# Patient Record
Sex: FEMALE | Race: WHITE | Employment: OTHER | URBAN - METROPOLITAN AREA
[De-identification: names, ages, dates, MRNs, and addresses within clinical notes are randomized per-mention and may not be internally consistent; named-entity substitution may affect disease eponyms.]

---

## 2024-04-23 ENCOUNTER — APPOINTMENT (OUTPATIENT)
Dept: ULTRASOUND IMAGING | Age: 64
DRG: 330 | End: 2024-04-23
Payer: COMMERCIAL

## 2024-04-23 ENCOUNTER — APPOINTMENT (OUTPATIENT)
Dept: GENERAL RADIOLOGY | Age: 64
DRG: 330 | End: 2024-04-23
Payer: COMMERCIAL

## 2024-04-23 ENCOUNTER — APPOINTMENT (OUTPATIENT)
Dept: CT IMAGING | Age: 64
DRG: 330 | End: 2024-04-23
Payer: COMMERCIAL

## 2024-04-23 ENCOUNTER — HOSPITAL ENCOUNTER (INPATIENT)
Age: 64
LOS: 4 days | Discharge: HOME | DRG: 330 | End: 2024-04-27
Attending: EMERGENCY MEDICINE | Admitting: SURGERY
Payer: COMMERCIAL

## 2024-04-23 DIAGNOSIS — R19.7 NAUSEA VOMITING AND DIARRHEA: Primary | ICD-10-CM

## 2024-04-23 DIAGNOSIS — E87.20 LACTIC ACIDOSIS: ICD-10-CM

## 2024-04-23 DIAGNOSIS — R11.2 NAUSEA VOMITING AND DIARRHEA: Primary | ICD-10-CM

## 2024-04-23 DIAGNOSIS — R10.84 GENERALIZED ABDOMINAL PAIN: ICD-10-CM

## 2024-04-23 DIAGNOSIS — K56.3: ICD-10-CM

## 2024-04-23 DIAGNOSIS — K56.3 GALLSTONE ILEUS (HCC): ICD-10-CM

## 2024-04-23 LAB
ALBUMIN SERPL-MCNC: 4.3 G/DL (ref 3.5–5.2)
ALP SERPL-CCNC: 144 U/L (ref 35–104)
ALT SERPL-CCNC: 10 U/L (ref 0–32)
ANION GAP SERPL CALCULATED.3IONS-SCNC: 22 MMOL/L (ref 7–16)
AST SERPL-CCNC: 10 U/L (ref 0–31)
B-OH-BUTYR SERPL-MCNC: 0.26 MMOL/L (ref 0.02–0.27)
BASOPHILS # BLD: 0 K/UL (ref 0–0.2)
BASOPHILS NFR BLD: 0 % (ref 0–2)
BILIRUB DIRECT SERPL-MCNC: 0.4 MG/DL (ref 0–0.3)
BILIRUB INDIRECT SERPL-MCNC: 0.8 MG/DL (ref 0–1)
BILIRUB SERPL-MCNC: 1.2 MG/DL (ref 0–1.2)
BUN SERPL-MCNC: 55 MG/DL (ref 6–23)
CALCIUM SERPL-MCNC: 9.1 MG/DL (ref 8.6–10.2)
CHLORIDE SERPL-SCNC: 94 MMOL/L (ref 98–107)
CO2 SERPL-SCNC: 16 MMOL/L (ref 22–29)
CREAT SERPL-MCNC: 2.4 MG/DL (ref 0.5–1)
EOSINOPHIL # BLD: 0 K/UL (ref 0.05–0.5)
EOSINOPHILS RELATIVE PERCENT: 0 % (ref 0–6)
ERYTHROCYTE [DISTWIDTH] IN BLOOD BY AUTOMATED COUNT: 15.8 % (ref 11.5–15)
GFR SERPL CREATININE-BSD FRML MDRD: 22 ML/MIN/1.73M2
GLUCOSE BLD-MCNC: 177 MG/DL (ref 74–99)
GLUCOSE SERPL-MCNC: 238 MG/DL (ref 74–99)
HCT VFR BLD AUTO: 37.6 % (ref 34–48)
HGB BLD-MCNC: 11.8 G/DL (ref 11.5–15.5)
LACTATE BLDV-SCNC: 2.2 MMOL/L (ref 0.5–1.9)
LACTATE BLDV-SCNC: 3.3 MMOL/L (ref 0.5–1.9)
LACTATE BLDV-SCNC: 4.1 MMOL/L (ref 0.5–2.2)
LIPASE SERPL-CCNC: 15 U/L (ref 13–60)
LYMPHOCYTES NFR BLD: 1.21 K/UL (ref 1.5–4)
LYMPHOCYTES RELATIVE PERCENT: 10 % (ref 20–42)
MCH RBC QN AUTO: 24 PG (ref 26–35)
MCHC RBC AUTO-ENTMCNC: 31.4 G/DL (ref 32–34.5)
MCV RBC AUTO: 76.6 FL (ref 80–99.9)
METAMYELOCYTES ABSOLUTE COUNT: 0.12 K/UL (ref 0–0.12)
METAMYELOCYTES: 1 % (ref 0–1)
MONOCYTES NFR BLD: 1.82 K/UL (ref 0.1–0.95)
MONOCYTES NFR BLD: 15 % (ref 2–12)
MYELOCYTES ABSOLUTE COUNT: 0.12 K/UL
MYELOCYTES: 1 %
NEUTROPHILS NFR BLD: 73 % (ref 43–80)
NEUTS SEG NFR BLD: 8.83 K/UL (ref 1.8–7.3)
PH VENOUS: 7.24 (ref 7.35–7.45)
PLATELET # BLD AUTO: 522 K/UL (ref 130–450)
PMV BLD AUTO: 9.7 FL (ref 7–12)
POTASSIUM SERPL-SCNC: 4.8 MMOL/L (ref 3.5–5)
PROT SERPL-MCNC: 8.6 G/DL (ref 6.4–8.3)
RBC # BLD AUTO: 4.91 M/UL (ref 3.5–5.5)
RBC # BLD: ABNORMAL 10*6/UL
SODIUM SERPL-SCNC: 132 MMOL/L (ref 132–146)
TROPONIN I SERPL HS-MCNC: 21 NG/L (ref 0–9)
TROPONIN I SERPL HS-MCNC: 27 NG/L (ref 0–9)
WBC # BLD: ABNORMAL 10*3/UL
WBC # BLD: ABNORMAL 10*3/UL
WBC OTHER # BLD: 12.1 K/UL (ref 4.5–11.5)

## 2024-04-23 PROCEDURE — 36415 COLL VENOUS BLD VENIPUNCTURE: CPT

## 2024-04-23 PROCEDURE — 80053 COMPREHEN METABOLIC PANEL: CPT

## 2024-04-23 PROCEDURE — 82010 KETONE BODYS QUAN: CPT

## 2024-04-23 PROCEDURE — 2580000003 HC RX 258

## 2024-04-23 PROCEDURE — 2580000003 HC RX 258: Performed by: SURGERY

## 2024-04-23 PROCEDURE — 82248 BILIRUBIN DIRECT: CPT

## 2024-04-23 PROCEDURE — 87040 BLOOD CULTURE FOR BACTERIA: CPT

## 2024-04-23 PROCEDURE — 87086 URINE CULTURE/COLONY COUNT: CPT

## 2024-04-23 PROCEDURE — 84484 ASSAY OF TROPONIN QUANT: CPT

## 2024-04-23 PROCEDURE — 96361 HYDRATE IV INFUSION ADD-ON: CPT

## 2024-04-23 PROCEDURE — 6360000002 HC RX W HCPCS: Performed by: INTERNAL MEDICINE

## 2024-04-23 PROCEDURE — 76705 ECHO EXAM OF ABDOMEN: CPT

## 2024-04-23 PROCEDURE — 93005 ELECTROCARDIOGRAM TRACING: CPT

## 2024-04-23 PROCEDURE — 83690 ASSAY OF LIPASE: CPT

## 2024-04-23 PROCEDURE — 83605 ASSAY OF LACTIC ACID: CPT

## 2024-04-23 PROCEDURE — 99285 EMERGENCY DEPT VISIT HI MDM: CPT

## 2024-04-23 PROCEDURE — 6360000002 HC RX W HCPCS

## 2024-04-23 PROCEDURE — 96374 THER/PROPH/DIAG INJ IV PUSH: CPT

## 2024-04-23 PROCEDURE — 74176 CT ABD & PELVIS W/O CONTRAST: CPT

## 2024-04-23 PROCEDURE — 82962 GLUCOSE BLOOD TEST: CPT

## 2024-04-23 PROCEDURE — 71045 X-RAY EXAM CHEST 1 VIEW: CPT

## 2024-04-23 PROCEDURE — 82800 BLOOD PH: CPT

## 2024-04-23 PROCEDURE — 1200000000 HC SEMI PRIVATE

## 2024-04-23 PROCEDURE — 85025 COMPLETE CBC W/AUTO DIFF WBC: CPT

## 2024-04-23 RX ORDER — ATORVASTATIN CALCIUM 20 MG/1
20 TABLET, FILM COATED ORAL DAILY
COMMUNITY

## 2024-04-23 RX ORDER — MAGNESIUM SULFATE IN WATER 40 MG/ML
2000 INJECTION, SOLUTION INTRAVENOUS PRN
Status: DISCONTINUED | OUTPATIENT
Start: 2024-04-23 | End: 2024-04-23

## 2024-04-23 RX ORDER — ONDANSETRON 2 MG/ML
4 INJECTION INTRAMUSCULAR; INTRAVENOUS EVERY 6 HOURS PRN
Status: DISCONTINUED | OUTPATIENT
Start: 2024-04-23 | End: 2024-04-27 | Stop reason: HOSPADM

## 2024-04-23 RX ORDER — POTASSIUM CHLORIDE 20 MEQ/1
40 TABLET, EXTENDED RELEASE ORAL PRN
Status: DISCONTINUED | OUTPATIENT
Start: 2024-04-23 | End: 2024-04-23

## 2024-04-23 RX ORDER — ONDANSETRON 4 MG/1
4 TABLET, ORALLY DISINTEGRATING ORAL EVERY 8 HOURS PRN
Status: DISCONTINUED | OUTPATIENT
Start: 2024-04-23 | End: 2024-04-27 | Stop reason: HOSPADM

## 2024-04-23 RX ORDER — POTASSIUM CHLORIDE 7.45 MG/ML
10 INJECTION INTRAVENOUS PRN
Status: DISCONTINUED | OUTPATIENT
Start: 2024-04-23 | End: 2024-04-23

## 2024-04-23 RX ORDER — MORPHINE SULFATE 2 MG/ML
2 INJECTION, SOLUTION INTRAMUSCULAR; INTRAVENOUS
Status: DISCONTINUED | OUTPATIENT
Start: 2024-04-23 | End: 2024-04-27 | Stop reason: HOSPADM

## 2024-04-23 RX ORDER — 0.9 % SODIUM CHLORIDE 0.9 %
1500 INTRAVENOUS SOLUTION INTRAVENOUS ONCE
Status: COMPLETED | OUTPATIENT
Start: 2024-04-23 | End: 2024-04-23

## 2024-04-23 RX ORDER — MORPHINE SULFATE 2 MG/ML
1 INJECTION, SOLUTION INTRAMUSCULAR; INTRAVENOUS ONCE
Status: COMPLETED | OUTPATIENT
Start: 2024-04-23 | End: 2024-04-23

## 2024-04-23 RX ORDER — ONDANSETRON 2 MG/ML
4 INJECTION INTRAMUSCULAR; INTRAVENOUS ONCE
Status: COMPLETED | OUTPATIENT
Start: 2024-04-23 | End: 2024-04-23

## 2024-04-23 RX ORDER — ENOXAPARIN SODIUM 100 MG/ML
40 INJECTION SUBCUTANEOUS DAILY
Status: DISCONTINUED | OUTPATIENT
Start: 2024-04-24 | End: 2024-04-23 | Stop reason: DRUGHIGH

## 2024-04-23 RX ORDER — METRONIDAZOLE 500 MG/100ML
500 INJECTION, SOLUTION INTRAVENOUS ONCE
Status: COMPLETED | OUTPATIENT
Start: 2024-04-23 | End: 2024-04-23

## 2024-04-23 RX ORDER — SODIUM CHLORIDE 0.9 % (FLUSH) 0.9 %
5-40 SYRINGE (ML) INJECTION EVERY 12 HOURS SCHEDULED
Status: DISCONTINUED | OUTPATIENT
Start: 2024-04-23 | End: 2024-04-27 | Stop reason: HOSPADM

## 2024-04-23 RX ORDER — ENOXAPARIN SODIUM 100 MG/ML
30 INJECTION SUBCUTANEOUS DAILY
Status: DISCONTINUED | OUTPATIENT
Start: 2024-04-24 | End: 2024-04-24

## 2024-04-23 RX ORDER — METRONIDAZOLE 500 MG/100ML
500 INJECTION, SOLUTION INTRAVENOUS EVERY 8 HOURS
Status: DISCONTINUED | OUTPATIENT
Start: 2024-04-24 | End: 2024-04-24

## 2024-04-23 RX ORDER — SODIUM CHLORIDE 9 MG/ML
INJECTION, SOLUTION INTRAVENOUS PRN
Status: DISCONTINUED | OUTPATIENT
Start: 2024-04-23 | End: 2024-04-27 | Stop reason: HOSPADM

## 2024-04-23 RX ORDER — LEVOTHYROXINE SODIUM 0.03 MG/1
25 TABLET ORAL DAILY
COMMUNITY

## 2024-04-23 RX ORDER — SODIUM CHLORIDE, SODIUM LACTATE, POTASSIUM CHLORIDE, CALCIUM CHLORIDE 600; 310; 30; 20 MG/100ML; MG/100ML; MG/100ML; MG/100ML
INJECTION, SOLUTION INTRAVENOUS CONTINUOUS
Status: DISCONTINUED | OUTPATIENT
Start: 2024-04-23 | End: 2024-04-26

## 2024-04-23 RX ORDER — MORPHINE SULFATE 4 MG/ML
4 INJECTION, SOLUTION INTRAMUSCULAR; INTRAVENOUS
Status: DISCONTINUED | OUTPATIENT
Start: 2024-04-23 | End: 2024-04-27 | Stop reason: HOSPADM

## 2024-04-23 RX ORDER — SODIUM CHLORIDE 0.9 % (FLUSH) 0.9 %
5-40 SYRINGE (ML) INJECTION PRN
Status: DISCONTINUED | OUTPATIENT
Start: 2024-04-23 | End: 2024-04-27 | Stop reason: HOSPADM

## 2024-04-23 RX ORDER — 0.9 % SODIUM CHLORIDE 0.9 %
1000 INTRAVENOUS SOLUTION INTRAVENOUS ONCE
Status: COMPLETED | OUTPATIENT
Start: 2024-04-23 | End: 2024-04-23

## 2024-04-23 RX ADMIN — ONDANSETRON 4 MG: 2 INJECTION INTRAMUSCULAR; INTRAVENOUS at 16:11

## 2024-04-23 RX ADMIN — SODIUM CHLORIDE, PRESERVATIVE FREE 10 ML: 5 INJECTION INTRAVENOUS at 22:36

## 2024-04-23 RX ADMIN — SODIUM CHLORIDE 1500 ML: 9 INJECTION, SOLUTION INTRAVENOUS at 16:05

## 2024-04-23 RX ADMIN — SODIUM CHLORIDE, POTASSIUM CHLORIDE, SODIUM LACTATE AND CALCIUM CHLORIDE: 600; 310; 30; 20 INJECTION, SOLUTION INTRAVENOUS at 21:57

## 2024-04-23 RX ADMIN — METRONIDAZOLE 500 MG: 500 INJECTION, SOLUTION INTRAVENOUS at 19:43

## 2024-04-23 RX ADMIN — SODIUM CHLORIDE 1000 ML: 9 INJECTION, SOLUTION INTRAVENOUS at 19:15

## 2024-04-23 RX ADMIN — MORPHINE SULFATE 1 MG: 2 INJECTION, SOLUTION INTRAMUSCULAR; INTRAVENOUS at 23:12

## 2024-04-23 RX ADMIN — CEFTRIAXONE 1000 MG: 1 INJECTION, POWDER, FOR SOLUTION INTRAMUSCULAR; INTRAVENOUS at 19:40

## 2024-04-23 ASSESSMENT — PAIN DESCRIPTION - ORIENTATION
ORIENTATION: RIGHT;LEFT;UPPER
ORIENTATION: LOWER
ORIENTATION: RIGHT;LEFT;LOWER

## 2024-04-23 ASSESSMENT — PAIN DESCRIPTION - LOCATION
LOCATION: ABDOMEN

## 2024-04-23 ASSESSMENT — ENCOUNTER SYMPTOMS
SORE THROAT: 0
VOICE CHANGE: 0
BLOOD IN STOOL: 0
NAUSEA: 1
BACK PAIN: 0
TROUBLE SWALLOWING: 0
SHORTNESS OF BREATH: 0
WHEEZING: 0
DIARRHEA: 1
ABDOMINAL PAIN: 1
VOMITING: 1
PHOTOPHOBIA: 0

## 2024-04-23 ASSESSMENT — PAIN SCALES - GENERAL
PAINLEVEL_OUTOF10: 7
PAINLEVEL_OUTOF10: 7
PAINLEVEL_OUTOF10: 8

## 2024-04-23 ASSESSMENT — PAIN DESCRIPTION - PAIN TYPE: TYPE: ACUTE PAIN

## 2024-04-23 ASSESSMENT — PAIN DESCRIPTION - DESCRIPTORS
DESCRIPTORS: CRAMPING;SQUEEZING
DESCRIPTORS: CRAMPING
DESCRIPTORS: CRAMPING

## 2024-04-23 ASSESSMENT — LIFESTYLE VARIABLES
HOW OFTEN DO YOU HAVE A DRINK CONTAINING ALCOHOL: NEVER
HOW MANY STANDARD DRINKS CONTAINING ALCOHOL DO YOU HAVE ON A TYPICAL DAY: PATIENT DOES NOT DRINK

## 2024-04-23 ASSESSMENT — PAIN - FUNCTIONAL ASSESSMENT: PAIN_FUNCTIONAL_ASSESSMENT: 0-10

## 2024-04-23 ASSESSMENT — PAIN DESCRIPTION - FREQUENCY: FREQUENCY: INTERMITTENT

## 2024-04-23 NOTE — ED NOTES
Department of Emergency Medicine  FIRST PROVIDER TRIAGE NOTE             Independent MLP           4/23/24  2:51 PM EDT    Date of Encounter: 4/23/24   MRN: [id number]      HPI: Zuri Boggs is a 63 y.o. female who presents to the ED for No chief complaint on file.   Vomiting , diarrhea     ROS: Negative for Suicidal ideation or Homicidal Ideation.    PE: Gen Appearance/Constitutional: alert  CV: regular rate  Pulm: CTA bilat     Initial Plan of Care: All treatment areas with department are currently occupied. Plan to order/Initiate the following while awaiting opening in ED: labs and imaging studies.  Initiate Treatment-Testing, Proceed toTreatment Area When Bed Available for ED Attending/MLP to Continue Care    Electronically signed by SUSAN Corea   DD: 4/23/24

## 2024-04-24 ENCOUNTER — ANESTHESIA EVENT (OUTPATIENT)
Dept: OPERATING ROOM | Age: 64
End: 2024-04-24
Payer: COMMERCIAL

## 2024-04-24 ENCOUNTER — ANESTHESIA (OUTPATIENT)
Dept: OPERATING ROOM | Age: 64
End: 2024-04-24
Payer: COMMERCIAL

## 2024-04-24 PROBLEM — R65.10 SIRS (SYSTEMIC INFLAMMATORY RESPONSE SYNDROME) (HCC): Status: ACTIVE | Noted: 2024-04-24

## 2024-04-24 PROBLEM — R19.7 NAUSEA VOMITING AND DIARRHEA: Status: ACTIVE | Noted: 2024-04-24

## 2024-04-24 PROBLEM — R11.2 NAUSEA VOMITING AND DIARRHEA: Status: ACTIVE | Noted: 2024-04-24

## 2024-04-24 LAB
ALBUMIN SERPL-MCNC: 3.1 G/DL (ref 3.5–5.2)
ALP SERPL-CCNC: 109 U/L (ref 35–104)
ALT SERPL-CCNC: 8 U/L (ref 0–32)
ANION GAP SERPL CALCULATED.3IONS-SCNC: 15 MMOL/L (ref 7–16)
AST SERPL-CCNC: 9 U/L (ref 0–31)
BASOPHILS # BLD: 0.03 K/UL (ref 0–0.2)
BASOPHILS NFR BLD: 1 % (ref 0–2)
BILIRUB SERPL-MCNC: 0.7 MG/DL (ref 0–1.2)
BUN SERPL-MCNC: 51 MG/DL (ref 6–23)
C DIFF GDH + TOXINS A+B STL QL IA.RAPID: NEGATIVE
CALCIUM SERPL-MCNC: 7.6 MG/DL (ref 8.6–10.2)
CHLORIDE SERPL-SCNC: 103 MMOL/L (ref 98–107)
CO2 SERPL-SCNC: 15 MMOL/L (ref 22–29)
CREAT SERPL-MCNC: 1.2 MG/DL (ref 0.5–1)
EKG ATRIAL RATE: 110 BPM
EKG P AXIS: 58 DEGREES
EKG P-R INTERVAL: 120 MS
EKG Q-T INTERVAL: 342 MS
EKG QRS DURATION: 84 MS
EKG QTC CALCULATION (BAZETT): 462 MS
EKG R AXIS: 13 DEGREES
EKG T AXIS: 36 DEGREES
EKG VENTRICULAR RATE: 110 BPM
EOSINOPHIL # BLD: 0.09 K/UL (ref 0.05–0.5)
EOSINOPHILS RELATIVE PERCENT: 3 % (ref 0–6)
ERYTHROCYTE [DISTWIDTH] IN BLOOD BY AUTOMATED COUNT: 15.8 % (ref 11.5–15)
GFR SERPL CREATININE-BSD FRML MDRD: 53 ML/MIN/1.73M2
GLUCOSE BLD-MCNC: 171 MG/DL (ref 74–99)
GLUCOSE BLD-MCNC: 192 MG/DL (ref 74–99)
GLUCOSE BLD-MCNC: 208 MG/DL (ref 74–99)
GLUCOSE BLD-MCNC: 208 MG/DL (ref 74–99)
GLUCOSE BLD-MCNC: 231 MG/DL (ref 74–99)
GLUCOSE SERPL-MCNC: 202 MG/DL (ref 74–99)
HBA1C MFR BLD: 7.5 % (ref 4–5.6)
HCT VFR BLD AUTO: 31.5 % (ref 34–48)
HGB BLD-MCNC: 9.7 G/DL (ref 11.5–15.5)
LACTATE BLDV-SCNC: 1.6 MMOL/L (ref 0.5–2.2)
LYMPHOCYTES NFR BLD: 0.34 K/UL (ref 1.5–4)
LYMPHOCYTES RELATIVE PERCENT: 10 % (ref 20–42)
MCH RBC QN AUTO: 23.6 PG (ref 26–35)
MCHC RBC AUTO-ENTMCNC: 30.8 G/DL (ref 32–34.5)
MCV RBC AUTO: 76.6 FL (ref 80–99.9)
METAMYELOCYTES ABSOLUTE COUNT: 0.06 K/UL (ref 0–0.12)
METAMYELOCYTES: 2 % (ref 0–1)
MONOCYTES NFR BLD: 0.25 K/UL (ref 0.1–0.95)
MONOCYTES NFR BLD: 7 % (ref 2–12)
MYELOCYTES ABSOLUTE COUNT: 0.03 K/UL
MYELOCYTES: 1 %
NEUTROPHILS NFR BLD: 77 % (ref 43–80)
NEUTS SEG NFR BLD: 2.79 K/UL (ref 1.8–7.3)
PLATELET # BLD AUTO: 378 K/UL (ref 130–450)
PMV BLD AUTO: 9.7 FL (ref 7–12)
POTASSIUM SERPL-SCNC: 4.3 MMOL/L (ref 3.5–5)
PROT SERPL-MCNC: 6.9 G/DL (ref 6.4–8.3)
RBC # BLD AUTO: 4.11 M/UL (ref 3.5–5.5)
RBC # BLD: NORMAL 10*6/UL
SODIUM SERPL-SCNC: 133 MMOL/L (ref 132–146)
SPECIMEN DESCRIPTION: NORMAL
TSH SERPL DL<=0.05 MIU/L-ACNC: 1.63 UIU/ML (ref 0.27–4.2)
WBC OTHER # BLD: 3.6 K/UL (ref 4.5–11.5)

## 2024-04-24 PROCEDURE — 99254 IP/OBS CNSLTJ NEW/EST MOD 60: CPT | Performed by: INTERNAL MEDICINE

## 2024-04-24 PROCEDURE — 6360000002 HC RX W HCPCS: Performed by: SURGERY

## 2024-04-24 PROCEDURE — 7100000000 HC PACU RECOVERY - FIRST 15 MIN: Performed by: SURGERY

## 2024-04-24 PROCEDURE — 3700000000 HC ANESTHESIA ATTENDED CARE: Performed by: SURGERY

## 2024-04-24 PROCEDURE — 44025 INCISION OF LARGE BOWEL: CPT | Performed by: SURGERY

## 2024-04-24 PROCEDURE — 2580000003 HC RX 258: Performed by: INTERNAL MEDICINE

## 2024-04-24 PROCEDURE — 93010 ELECTROCARDIOGRAM REPORT: CPT | Performed by: INTERNAL MEDICINE

## 2024-04-24 PROCEDURE — 7100000001 HC PACU RECOVERY - ADDTL 15 MIN: Performed by: SURGERY

## 2024-04-24 PROCEDURE — 2580000003 HC RX 258

## 2024-04-24 PROCEDURE — 0DC84ZZ EXTIRPATION OF MATTER FROM SMALL INTESTINE, PERCUTANEOUS ENDOSCOPIC APPROACH: ICD-10-PCS | Performed by: SURGERY

## 2024-04-24 PROCEDURE — 6360000002 HC RX W HCPCS: Performed by: INTERNAL MEDICINE

## 2024-04-24 PROCEDURE — 99223 1ST HOSP IP/OBS HIGH 75: CPT | Performed by: SURGERY

## 2024-04-24 PROCEDURE — 1200000000 HC SEMI PRIVATE

## 2024-04-24 PROCEDURE — 2500000003 HC RX 250 WO HCPCS: Performed by: SURGERY

## 2024-04-24 PROCEDURE — 6360000002 HC RX W HCPCS

## 2024-04-24 PROCEDURE — 2580000003 HC RX 258: Performed by: SURGERY

## 2024-04-24 PROCEDURE — 83605 ASSAY OF LACTIC ACID: CPT

## 2024-04-24 PROCEDURE — 87449 NOS EACH ORGANISM AG IA: CPT

## 2024-04-24 PROCEDURE — 0DU847Z SUPPLEMENT SMALL INTESTINE WITH AUTOLOGOUS TISSUE SUBSTITUTE, PERCUTANEOUS ENDOSCOPIC APPROACH: ICD-10-PCS | Performed by: SURGERY

## 2024-04-24 PROCEDURE — 36415 COLL VENOUS BLD VENIPUNCTURE: CPT

## 2024-04-24 PROCEDURE — 85025 COMPLETE CBC W/AUTO DIFF WBC: CPT

## 2024-04-24 PROCEDURE — 82962 GLUCOSE BLOOD TEST: CPT

## 2024-04-24 PROCEDURE — 3700000001 HC ADD 15 MINUTES (ANESTHESIA): Performed by: SURGERY

## 2024-04-24 PROCEDURE — 2720000010 HC SURG SUPPLY STERILE: Performed by: SURGERY

## 2024-04-24 PROCEDURE — 2709999900 HC NON-CHARGEABLE SUPPLY: Performed by: SURGERY

## 2024-04-24 PROCEDURE — 49905 OMENTAL FLAP INTRA-ABDOM: CPT | Performed by: SURGERY

## 2024-04-24 PROCEDURE — 87324 CLOSTRIDIUM AG IA: CPT

## 2024-04-24 PROCEDURE — 3600000004 HC SURGERY LEVEL 4 BASE: Performed by: SURGERY

## 2024-04-24 PROCEDURE — 2500000003 HC RX 250 WO HCPCS

## 2024-04-24 PROCEDURE — 83036 HEMOGLOBIN GLYCOSYLATED A1C: CPT

## 2024-04-24 PROCEDURE — 88307 TISSUE EXAM BY PATHOLOGIST: CPT

## 2024-04-24 PROCEDURE — 84443 ASSAY THYROID STIM HORMONE: CPT

## 2024-04-24 PROCEDURE — 0DB84ZZ EXCISION OF SMALL INTESTINE, PERCUTANEOUS ENDOSCOPIC APPROACH: ICD-10-PCS | Performed by: SURGERY

## 2024-04-24 PROCEDURE — 6370000000 HC RX 637 (ALT 250 FOR IP): Performed by: SURGERY

## 2024-04-24 PROCEDURE — 3600000014 HC SURGERY LEVEL 4 ADDTL 15MIN: Performed by: SURGERY

## 2024-04-24 PROCEDURE — 44120 REMOVAL OF SMALL INTESTINE: CPT | Performed by: SURGERY

## 2024-04-24 PROCEDURE — 80053 COMPREHEN METABOLIC PANEL: CPT

## 2024-04-24 RX ORDER — ONDANSETRON 2 MG/ML
4 INJECTION INTRAMUSCULAR; INTRAVENOUS
Status: CANCELLED | OUTPATIENT
Start: 2024-04-24 | End: 2024-04-25

## 2024-04-24 RX ORDER — FENTANYL CITRATE 0.05 MG/ML
50 INJECTION, SOLUTION INTRAMUSCULAR; INTRAVENOUS EVERY 5 MIN PRN
Status: CANCELLED | OUTPATIENT
Start: 2024-04-24

## 2024-04-24 RX ORDER — METHOCARBAMOL 500 MG/1
500 TABLET, FILM COATED ORAL 4 TIMES DAILY
Status: DISCONTINUED | OUTPATIENT
Start: 2024-04-24 | End: 2024-04-27 | Stop reason: HOSPADM

## 2024-04-24 RX ORDER — GLYCOPYRROLATE 0.2 MG/ML
INJECTION INTRAMUSCULAR; INTRAVENOUS PRN
Status: DISCONTINUED | OUTPATIENT
Start: 2024-04-24 | End: 2024-04-24 | Stop reason: SDUPTHER

## 2024-04-24 RX ORDER — DEXAMETHASONE SODIUM PHOSPHATE 10 MG/ML
INJECTION, SOLUTION INTRAMUSCULAR; INTRAVENOUS PRN
Status: DISCONTINUED | OUTPATIENT
Start: 2024-04-24 | End: 2024-04-24 | Stop reason: SDUPTHER

## 2024-04-24 RX ORDER — BUPIVACAINE HYDROCHLORIDE AND EPINEPHRINE 2.5; 5 MG/ML; UG/ML
INJECTION, SOLUTION EPIDURAL; INFILTRATION; INTRACAUDAL; PERINEURAL PRN
Status: DISCONTINUED | OUTPATIENT
Start: 2024-04-24 | End: 2024-04-24 | Stop reason: ALTCHOICE

## 2024-04-24 RX ORDER — LIDOCAINE HYDROCHLORIDE 20 MG/ML
INJECTION, SOLUTION INTRAVENOUS PRN
Status: DISCONTINUED | OUTPATIENT
Start: 2024-04-24 | End: 2024-04-24 | Stop reason: SDUPTHER

## 2024-04-24 RX ORDER — SODIUM CHLORIDE 0.9 % (FLUSH) 0.9 %
5-40 SYRINGE (ML) INJECTION EVERY 12 HOURS SCHEDULED
Status: CANCELLED | OUTPATIENT
Start: 2024-04-24

## 2024-04-24 RX ORDER — TRAMADOL HYDROCHLORIDE 50 MG/1
50 TABLET ORAL EVERY 6 HOURS PRN
Status: DISCONTINUED | OUTPATIENT
Start: 2024-04-24 | End: 2024-04-27 | Stop reason: HOSPADM

## 2024-04-24 RX ORDER — SUCCINYLCHOLINE/SOD CL,ISO/PF 200MG/10ML
SYRINGE (ML) INTRAVENOUS PRN
Status: DISCONTINUED | OUTPATIENT
Start: 2024-04-24 | End: 2024-04-24 | Stop reason: SDUPTHER

## 2024-04-24 RX ORDER — SODIUM CHLORIDE, SODIUM LACTATE, POTASSIUM CHLORIDE, AND CALCIUM CHLORIDE .6; .31; .03; .02 G/100ML; G/100ML; G/100ML; G/100ML
500 INJECTION, SOLUTION INTRAVENOUS ONCE
Status: COMPLETED | OUTPATIENT
Start: 2024-04-24 | End: 2024-04-24

## 2024-04-24 RX ORDER — ENOXAPARIN SODIUM 100 MG/ML
40 INJECTION SUBCUTANEOUS DAILY
Status: DISCONTINUED | OUTPATIENT
Start: 2024-04-25 | End: 2024-04-27 | Stop reason: HOSPADM

## 2024-04-24 RX ORDER — DEXTROSE MONOHYDRATE 100 MG/ML
INJECTION, SOLUTION INTRAVENOUS CONTINUOUS PRN
Status: DISCONTINUED | OUTPATIENT
Start: 2024-04-24 | End: 2024-04-27 | Stop reason: HOSPADM

## 2024-04-24 RX ORDER — DIPHENHYDRAMINE HYDROCHLORIDE 50 MG/ML
12.5 INJECTION INTRAMUSCULAR; INTRAVENOUS
Status: CANCELLED | OUTPATIENT
Start: 2024-04-24 | End: 2024-04-25

## 2024-04-24 RX ORDER — ONDANSETRON 2 MG/ML
INJECTION INTRAMUSCULAR; INTRAVENOUS PRN
Status: DISCONTINUED | OUTPATIENT
Start: 2024-04-24 | End: 2024-04-24 | Stop reason: SDUPTHER

## 2024-04-24 RX ORDER — FENTANYL CITRATE 50 UG/ML
INJECTION, SOLUTION INTRAMUSCULAR; INTRAVENOUS PRN
Status: DISCONTINUED | OUTPATIENT
Start: 2024-04-24 | End: 2024-04-24 | Stop reason: SDUPTHER

## 2024-04-24 RX ORDER — GLUCAGON 1 MG/ML
1 KIT INJECTION PRN
Status: DISCONTINUED | OUTPATIENT
Start: 2024-04-24 | End: 2024-04-27 | Stop reason: HOSPADM

## 2024-04-24 RX ORDER — TRAMADOL HYDROCHLORIDE 50 MG/1
25 TABLET ORAL EVERY 6 HOURS PRN
Status: DISCONTINUED | OUTPATIENT
Start: 2024-04-24 | End: 2024-04-27 | Stop reason: HOSPADM

## 2024-04-24 RX ORDER — METRONIDAZOLE 500 MG/100ML
500 INJECTION, SOLUTION INTRAVENOUS EVERY 8 HOURS
Status: DISCONTINUED | OUTPATIENT
Start: 2024-04-24 | End: 2024-04-24

## 2024-04-24 RX ORDER — NALOXONE HYDROCHLORIDE 0.4 MG/ML
INJECTION, SOLUTION INTRAMUSCULAR; INTRAVENOUS; SUBCUTANEOUS PRN
Status: CANCELLED | OUTPATIENT
Start: 2024-04-24

## 2024-04-24 RX ORDER — INSULIN LISPRO 100 [IU]/ML
0-4 INJECTION, SOLUTION INTRAVENOUS; SUBCUTANEOUS EVERY 4 HOURS
Status: DISCONTINUED | OUTPATIENT
Start: 2024-04-24 | End: 2024-04-27 | Stop reason: HOSPADM

## 2024-04-24 RX ORDER — MIDAZOLAM HYDROCHLORIDE 1 MG/ML
INJECTION INTRAMUSCULAR; INTRAVENOUS PRN
Status: DISCONTINUED | OUTPATIENT
Start: 2024-04-24 | End: 2024-04-24 | Stop reason: SDUPTHER

## 2024-04-24 RX ORDER — ROCURONIUM BROMIDE 10 MG/ML
INJECTION, SOLUTION INTRAVENOUS PRN
Status: DISCONTINUED | OUTPATIENT
Start: 2024-04-24 | End: 2024-04-24 | Stop reason: SDUPTHER

## 2024-04-24 RX ORDER — SODIUM CHLORIDE 9 MG/ML
INJECTION, SOLUTION INTRAVENOUS PRN
Status: CANCELLED | OUTPATIENT
Start: 2024-04-24

## 2024-04-24 RX ORDER — PROPOFOL 10 MG/ML
INJECTION, EMULSION INTRAVENOUS PRN
Status: DISCONTINUED | OUTPATIENT
Start: 2024-04-24 | End: 2024-04-24 | Stop reason: SDUPTHER

## 2024-04-24 RX ORDER — SODIUM CHLORIDE 0.9 % (FLUSH) 0.9 %
5-40 SYRINGE (ML) INJECTION PRN
Status: CANCELLED | OUTPATIENT
Start: 2024-04-24

## 2024-04-24 RX ORDER — NEOSTIGMINE METHYLSULFATE 1 MG/ML
INJECTION, SOLUTION INTRAVENOUS PRN
Status: DISCONTINUED | OUTPATIENT
Start: 2024-04-24 | End: 2024-04-24 | Stop reason: SDUPTHER

## 2024-04-24 RX ORDER — PROCHLORPERAZINE EDISYLATE 5 MG/ML
5 INJECTION INTRAMUSCULAR; INTRAVENOUS
Status: CANCELLED | OUTPATIENT
Start: 2024-04-24 | End: 2024-04-25

## 2024-04-24 RX ORDER — SODIUM CHLORIDE 9 MG/ML
INJECTION, SOLUTION INTRAVENOUS CONTINUOUS PRN
Status: DISCONTINUED | OUTPATIENT
Start: 2024-04-24 | End: 2024-04-24 | Stop reason: SDUPTHER

## 2024-04-24 RX ORDER — LORAZEPAM 2 MG/ML
0.5 INJECTION INTRAMUSCULAR
Status: CANCELLED | OUTPATIENT
Start: 2024-04-24 | End: 2024-04-25

## 2024-04-24 RX ADMIN — ONDANSETRON 4 MG: 4 TABLET, ORALLY DISINTEGRATING ORAL at 04:32

## 2024-04-24 RX ADMIN — MORPHINE SULFATE 2 MG: 2 INJECTION, SOLUTION INTRAMUSCULAR; INTRAVENOUS at 05:22

## 2024-04-24 RX ADMIN — ROCURONIUM BROMIDE 10 MG: 10 INJECTION INTRAVENOUS at 09:39

## 2024-04-24 RX ADMIN — FENTANYL CITRATE 50 MCG: 50 INJECTION, SOLUTION INTRAMUSCULAR; INTRAVENOUS at 10:01

## 2024-04-24 RX ADMIN — SODIUM CHLORIDE, POTASSIUM CHLORIDE, SODIUM LACTATE AND CALCIUM CHLORIDE 500 ML: 600; 310; 30; 20 INJECTION, SOLUTION INTRAVENOUS at 16:20

## 2024-04-24 RX ADMIN — Medication 180 MG: at 09:39

## 2024-04-24 RX ADMIN — METRONIDAZOLE 500 MG: 500 INJECTION, SOLUTION INTRAVENOUS at 09:54

## 2024-04-24 RX ADMIN — PHENYLEPHRINE HYDROCHLORIDE 200 MCG: 10 INJECTION INTRAVENOUS at 09:49

## 2024-04-24 RX ADMIN — ROCURONIUM BROMIDE 10 MG: 10 INJECTION INTRAVENOUS at 09:55

## 2024-04-24 RX ADMIN — SODIUM CHLORIDE, POTASSIUM CHLORIDE, SODIUM LACTATE AND CALCIUM CHLORIDE: 600; 310; 30; 20 INJECTION, SOLUTION INTRAVENOUS at 23:00

## 2024-04-24 RX ADMIN — MIDAZOLAM 1 MG: 1 INJECTION INTRAMUSCULAR; INTRAVENOUS at 09:34

## 2024-04-24 RX ADMIN — GLYCOPYRROLATE 0.6 MG: 0.2 INJECTION INTRAMUSCULAR; INTRAVENOUS at 10:17

## 2024-04-24 RX ADMIN — SODIUM CHLORIDE: 900 INJECTION, SOLUTION INTRAVENOUS at 09:56

## 2024-04-24 RX ADMIN — SODIUM CHLORIDE, POTASSIUM CHLORIDE, SODIUM LACTATE AND CALCIUM CHLORIDE: 600; 310; 30; 20 INJECTION, SOLUTION INTRAVENOUS at 11:50

## 2024-04-24 RX ADMIN — PHENYLEPHRINE HYDROCHLORIDE 200 MCG: 10 INJECTION INTRAVENOUS at 10:12

## 2024-04-24 RX ADMIN — DEXAMETHASONE SODIUM PHOSPHATE 10 MG: 10 INJECTION, SOLUTION INTRAMUSCULAR; INTRAVENOUS at 09:47

## 2024-04-24 RX ADMIN — SODIUM CHLORIDE, POTASSIUM CHLORIDE, SODIUM LACTATE AND CALCIUM CHLORIDE 500 ML: 600; 310; 30; 20 INJECTION, SOLUTION INTRAVENOUS at 14:07

## 2024-04-24 RX ADMIN — INSULIN LISPRO 1 UNITS: 100 INJECTION, SOLUTION INTRAVENOUS; SUBCUTANEOUS at 14:40

## 2024-04-24 RX ADMIN — SODIUM CHLORIDE, POTASSIUM CHLORIDE, SODIUM LACTATE AND CALCIUM CHLORIDE: 600; 310; 30; 20 INJECTION, SOLUTION INTRAVENOUS at 04:35

## 2024-04-24 RX ADMIN — PROPOFOL 150 MG: 10 INJECTION, EMULSION INTRAVENOUS at 09:39

## 2024-04-24 RX ADMIN — ONDANSETRON 4 MG: 2 INJECTION INTRAMUSCULAR; INTRAVENOUS at 09:54

## 2024-04-24 RX ADMIN — PIPERACILLIN AND TAZOBACTAM 3375 MG: 3; .375 INJECTION, POWDER, LYOPHILIZED, FOR SOLUTION INTRAVENOUS at 22:59

## 2024-04-24 RX ADMIN — FENTANYL CITRATE 50 MCG: 50 INJECTION, SOLUTION INTRAMUSCULAR; INTRAVENOUS at 10:20

## 2024-04-24 RX ADMIN — PIPERACILLIN AND TAZOBACTAM 3375 MG: 3; .375 INJECTION, POWDER, LYOPHILIZED, FOR SOLUTION INTRAVENOUS at 14:22

## 2024-04-24 RX ADMIN — INSULIN LISPRO 1 UNITS: 100 INJECTION, SOLUTION INTRAVENOUS; SUBCUTANEOUS at 19:07

## 2024-04-24 RX ADMIN — ROCURONIUM BROMIDE 10 MG: 10 INJECTION INTRAVENOUS at 10:00

## 2024-04-24 RX ADMIN — METHOCARBAMOL 500 MG: 500 TABLET ORAL at 13:08

## 2024-04-24 RX ADMIN — METHOCARBAMOL 500 MG: 500 TABLET ORAL at 17:38

## 2024-04-24 RX ADMIN — SODIUM CHLORIDE, PRESERVATIVE FREE 10 ML: 5 INJECTION INTRAVENOUS at 11:50

## 2024-04-24 RX ADMIN — MORPHINE SULFATE 2 MG: 2 INJECTION, SOLUTION INTRAMUSCULAR; INTRAVENOUS at 02:02

## 2024-04-24 RX ADMIN — PHENYLEPHRINE HYDROCHLORIDE 100 MCG: 10 INJECTION INTRAVENOUS at 09:44

## 2024-04-24 RX ADMIN — TRAMADOL HYDROCHLORIDE 50 MG: 50 TABLET ORAL at 14:08

## 2024-04-24 RX ADMIN — FENTANYL CITRATE 150 MCG: 50 INJECTION, SOLUTION INTRAMUSCULAR; INTRAVENOUS at 09:39

## 2024-04-24 RX ADMIN — Medication 3 MG: at 10:17

## 2024-04-24 RX ADMIN — SODIUM CHLORIDE, PRESERVATIVE FREE 10 ML: 5 INJECTION INTRAVENOUS at 22:54

## 2024-04-24 RX ADMIN — LIDOCAINE HYDROCHLORIDE 100 MG: 20 INJECTION, SOLUTION INTRAVENOUS at 09:39

## 2024-04-24 RX ADMIN — METRONIDAZOLE 500 MG: 500 INJECTION, SOLUTION INTRAVENOUS at 03:18

## 2024-04-24 ASSESSMENT — PAIN DESCRIPTION - DESCRIPTORS
DESCRIPTORS: ACHING
DESCRIPTORS: ACHING

## 2024-04-24 ASSESSMENT — PAIN DESCRIPTION - ORIENTATION
ORIENTATION: LOWER
ORIENTATION: MID

## 2024-04-24 ASSESSMENT — PAIN SCALES - GENERAL
PAINLEVEL_OUTOF10: 0
PAINLEVEL_OUTOF10: 7
PAINLEVEL_OUTOF10: 0
PAINLEVEL_OUTOF10: 0
PAINLEVEL_OUTOF10: 6
PAINLEVEL_OUTOF10: 0
PAINLEVEL_OUTOF10: 8
PAINLEVEL_OUTOF10: 10
PAINLEVEL_OUTOF10: 6

## 2024-04-24 ASSESSMENT — PAIN DESCRIPTION - LOCATION
LOCATION: ABDOMEN
LOCATION: BACK
LOCATION: ABDOMEN
LOCATION: ABDOMEN

## 2024-04-24 NOTE — PLAN OF CARE
[facility] Hospitalist   Progress Note    Admitting Date and Time: 4/23/2024  3:07 PM  Admit Dx: Obstruction of intestine by gallstone (HCC) [K56.3]  Gallstone ileus (HCC) [K56.3]    Subjective:    Pt admitted earlier this morning with gallstone ileus and possible intraintestinal foreign body.  She had laparoscopic small bowel resection and removal of intestinal foreign body this morning without immediate complications.    At this time she is awake, alert, but appears very fatigued.  Vital signs significant for persistent tachycardia.  Clinically she appears dehydrated.    ROS: denies fever, chills, cp, sob, n/v, HA unless stated above.     insulin lispro  0-4 Units SubCUTAneous Q4H    methocarbamol  500 mg Oral 4x Daily    [START ON 4/25/2024] enoxaparin  40 mg SubCUTAneous Daily    piperacillin-tazobactam  3,375 mg IntraVENous Q8H    lactated ringers  500 mL IntraVENous Once    sodium chloride flush  5-40 mL IntraVENous 2 times per day     glucose, 4 tablet, PRN  dextrose bolus, 125 mL, PRN   Or  dextrose bolus, 250 mL, PRN  glucagon (rDNA), 1 mg, PRN  dextrose, , Continuous PRN  traMADol, 25 mg, Q6H PRN   Or  traMADol, 50 mg, Q6H PRN  sodium chloride flush, 5-40 mL, PRN  sodium chloride, , PRN  ondansetron, 4 mg, Q8H PRN   Or  ondansetron, 4 mg, Q6H PRN  morphine, 2 mg, Q2H PRN   Or  morphine, 4 mg, Q2H PRN         Objective:    BP (!) 106/55   Pulse (!) 134   Temp 99.7 °F (37.6 °C) (Oral)   Resp 20   Ht 1.499 m (4' 11\")   Wt 74.8 kg (165 lb)   SpO2 93%   BMI 33.33 kg/m²   General Appearance: Awake, alert, oriented x 3 but appears very fatigued  Skin: warm and dry  Head: normocephalic and atraumatic  Eyes: pupils equal, round, and reactive to light, extraocular eye movements intact, conjunctivae normal  Neck: neck supple and non tender without mass   Pulmonary/Chest: normal effort on room air.  Clear to auscultation bilaterally without wheezes, crackles, or rhonchi  Cardiovascular:

## 2024-04-24 NOTE — H&P
General Surgery History and Physical    Patient's Name/Date of Birth: Zuri Boggs / 1960    Date: April 24, 2024     Surgeon: Howie Arce M.D.    PCP: No primary care provider on file.     Chief Complaint: abd pain     HPI:   Zuri Boggs is a 63 y.o. female who presents for evaluation of abd pain and came to ED where CT done likely gallstone ileus. Timing is constant, radiation to low abdomen, alleviated by nothing and started days ago and severity is 2-8/10      No past medical history on file.    No past surgical history on file.    Current Facility-Administered Medications   Medication Dose Route Frequency Provider Last Rate Last Admin    insulin lispro (HUMALOG) injection vial 0-4 Units  0-4 Units SubCUTAneous Q4H Doreen Ceron MD        glucose chewable tablet 16 g  4 tablet Oral PRN Doreen Ceron MD        dextrose bolus 10% 125 mL  125 mL IntraVENous PRN Doreen Ceron MD        Or    dextrose bolus 10% 250 mL  250 mL IntraVENous PRN Doreen Ceron MD        glucagon injection 1 mg  1 mg SubCUTAneous PRN Doreen Ceron MD        dextrose 10 % infusion   IntraVENous Continuous PRN Doreen Ceron MD        sodium chloride flush 0.9 % injection 5-40 mL  5-40 mL IntraVENous 2 times per day Howie Arce MD   10 mL at 04/23/24 2236    sodium chloride flush 0.9 % injection 5-40 mL  5-40 mL IntraVENous PRN Howie Arce MD        0.9 % sodium chloride infusion   IntraVENous PRN Howie Arce MD        ondansetron (ZOFRAN-ODT) disintegrating tablet 4 mg  4 mg Oral Q8H PRN Howie Arce MD   4 mg at 04/24/24 0432    Or    ondansetron (ZOFRAN) injection 4 mg  4 mg IntraVENous Q6H PRN Howie Arce MD        lactated ringers IV soln infusion   IntraVENous Continuous Howie Arce  mL/hr at 04/24/24 0435 New Bag at 04/24/24 0435    morphine (PF) injection 2 mg  2 mg IntraVENous Q2H PRN Howie Arce MD   2 mg at 04/24/24 0522    Or    morphine injection 4 mg  4  consent.      Howie Arce MD  8:43 AM  4/24/2024

## 2024-04-24 NOTE — ANESTHESIA POSTPROCEDURE EVALUATION
Department of Anesthesiology  Postprocedure Note    Patient: Zuri Boggs  MRN: [id number]  YOB: 1960  Date of evaluation: 4/24/2024    Procedure Summary       Date: 04/24/24 Room / Location: [address]    Anesthesia Start: 0932 Anesthesia Stop: 1031    Procedure: EXPLORATORY LAPAROSCOPY SMALL  BOWEL RESECTION (Abdomen) Diagnosis:       Gallstone ileus (HCC)      (Gallstone ileus (HCC) [K56.3])    Surgeons: Howie Arce MD Responsible Provider: Jag Enriquez MD    Anesthesia Type: General ASA Status: 3            Anesthesia Type: General    Mary Beth Phase I: Mary Beth Score: 9    Mary Beth Phase II:      Anesthesia Post Evaluation    Patient location during evaluation: PACU  Patient participation: complete - patient participated  Level of consciousness: awake and alert  Pain score: 3  Airway patency: patent  Nausea & Vomiting: no nausea  Cardiovascular status: blood pressure returned to baseline  Respiratory status: acceptable  Hydration status: euvolemic  Pain management: adequate    No notable events documented.

## 2024-04-24 NOTE — ED PROVIDER NOTES
[facility] 4 MED SURG TELE  EMERGENCY DEPARTMENT ENCOUNTER      Pt Name: Zuri Boggs  MRN: [id number]  Birthdate 1960  Date of evaluation: 4/23/2024  Provider: Juliano Castano DO  PCP: No primary care provider on file.    HPI: 63-year-old female present emerged part complaints of abdominal pain nausea vomiting diarrhea.  Began on Sunday.  Patient denies any traumatic injury.  Reports that she was eating shrimp and [employer] prior to symptoms onset from [employer] on Friday.  Decreased intake secondary to nausea.  ANO x 4 following commands.  Denies any numbness weakness chest pain shortness of breath patient reports that she has a history of low blood pressures at baseline.     Chief Complaint   Patient presents with    Emesis    Abdominal Pain     LOWER ABD PAIN SINCE SUNDAY/ DIARRHEA       Review of Systems   Constitutional:  Negative for chills, fatigue and fever.   HENT:  Negative for congestion, nosebleeds, sore throat, trouble swallowing and voice change.    Eyes:  Negative for photophobia and visual disturbance.   Respiratory:  Negative for shortness of breath and wheezing.    Cardiovascular:  Negative for chest pain and palpitations.   Gastrointestinal:  Positive for abdominal pain, diarrhea, nausea and vomiting. Negative for blood in stool.   Genitourinary:  Negative for dysuria, frequency and urgency.   Musculoskeletal:  Negative for arthralgias, back pain, neck pain and neck stiffness.   Skin:  Negative for rash and wound.   Neurological:  Negative for dizziness, syncope, weakness, light-headedness, numbness and headaches.   Psychiatric/Behavioral:  Negative for behavioral problems and confusion.         Physical Exam  Vitals reviewed.   Constitutional:       General: She is not in acute distress.     Appearance: Normal appearance.   HENT:      Head: Normocephalic.      Right Ear: External ear normal.      Left Ear: External ear normal.      Nose: Nose normal.      Mouth/Throat:      Pharynx: No oropharyngeal  24 96 % -- --   04/23/24 1700 (!) 88/54 -- -- (!) 108 20 98 % -- --   04/23/24 1645 (!) 101/52 -- -- (!) 111 19 96 % -- --   04/23/24 1604 (!) 89/55 -- -- (!) 110 23 95 % -- --   04/23/24 1503 (!) 81/47 -- -- -- -- -- -- --   04/23/24 1500 -- -- -- -- -- -- -- 74.4 kg (164 lb)   04/23/24 1450 -- 97.7 °F (36.5 °C) Infrared (!) 115 18 97 % -- --       Oxygen Saturation Interpretation: Normal    ------------------------------------------ PROGRESS NOTES ------------------------------------------  Re-evaluation(s):   Patient’s symptoms show no change  Repeat physical examination is improved    Counseling:  Staff have spoken with the patient and discussed today’s results, in addition to providing specific details for the plan of care and counseling regarding the diagnosis and prognosis.  Their questions are answered at this time and they are agreeable with the plan of admission.    --------------------------------- ADDITIONAL PROVIDER NOTES ---------------------------------  Consultations:  Spoke with Dr. Arce.  Discussed case.  They will admit the patient.  This patient's ED course included: a personal history and physicial examination, re-evaluation prior to disposition, multiple bedside re-evaluations, IV medications, cardiac monitoring, and continuous pulse oximetry    This patient has remained hemodynamically stable during their ED course.    Diagnosis:  1. Nausea vomiting and diarrhea    2. Generalized abdominal pain    3. Obstruction of intestine by gallstone (HCC)    4. Lactic acidosis        Disposition:  Patient's disposition: Admit to telemetry  Patient's condition is stable.         Attending was present and available throughout encounter including all critical portions; See Attending Note/Attestation for Final Plan  (Please note that portions of this note were completed with a voice recognition program.  Efforts were made to edit the dictations but occasionally words are mis-transcribed.)

## 2024-04-24 NOTE — PROGRESS NOTES
DVT Prophylaxis Adjustment Policy (DVT Prophylaxis)     This patient is on DVT Prophylaxis medication that requires a dose adjustment      Date Body Weight IBW  Adjusted BW SCr  CrCl Dialysis status   4/24/2024 74.8 kg (165 lb) Ideal body weight: 48.8 kg (107 lb 8.4 oz)  Adjusted ideal body weight: 59.2 kg (130 lb 8.2 oz) Serum creatinine: 1.2 mg/dL (H) 04/24/24 0848  Estimated creatinine clearance: 45 mL/min (A) N/a       Pharmacy has dose-adjusted the DVT Prophylaxis regimen to match   the recommendations from the following table        Ordered Medication:Lovenox 30mg daily    Order Changed/converted to: Lovenox 40mg daily      These changes were made per protocol according to the [facility] Pharmacist   Review for Appropriate Use and Automatic Dose Adjustments of   Subcutaneous Anticoagulants Policy     *Please note this dose may need readjusted if patient's condition changes.    Please contact pharmacy with any questions regarding these changes.    Nell Quiroz, Glenda 4/24/2024 12:08 PM  LAWRENCEW: [phone]

## 2024-04-24 NOTE — ANESTHESIA PRE PROCEDURE
Department of Anesthesiology  Preprocedure Note       Name:  Zuri Boggs   Age:  63 y.o.  :  1960                                          MRN:  [id number]         Date:  2024      Surgeon: Surgeon(s):  Howie Arce MD    Procedure: Procedure(s):  EXPLORATORY LAPAROSCOPY POSS OPEN POSS BOWEL RESECTION    Medications prior to admission:   Prior to Admission medications    Medication Sig Start Date End Date Taking? Authorizing Provider   metFORMIN (GLUCOPHAGE) 500 MG tablet Take 1 tablet by mouth 2 times daily (with meals)   Yes ProviderChelly MD   levothyroxine (SYNTHROID) 25 MCG tablet Take 1 tablet by mouth Daily   Yes Chelly Walker MD   atorvastatin (LIPITOR) 20 MG tablet Take 1 tablet by mouth daily   Yes ProviderChelly MD       Current medications:    Current Facility-Administered Medications   Medication Dose Route Frequency Provider Last Rate Last Admin    insulin lispro (HUMALOG) injection vial 0-4 Units  0-4 Units SubCUTAneous Q4H Doreen Ceron MD        glucose chewable tablet 16 g  4 tablet Oral PRN Doreen Ceron MD        dextrose bolus 10% 125 mL  125 mL IntraVENous PRN Doreen Ceron MD        Or    dextrose bolus 10% 250 mL  250 mL IntraVENous PRN Doreen Ceron MD        glucagon injection 1 mg  1 mg SubCUTAneous PRN Doreen Ceron MD        dextrose 10 % infusion   IntraVENous Continuous PRN Doreen Ceron MD        sodium chloride flush 0.9 % injection 5-40 mL  5-40 mL IntraVENous 2 times per day Howie Arce MD   10 mL at 24 2236    sodium chloride flush 0.9 % injection 5-40 mL  5-40 mL IntraVENous PRN Howie Arce MD        0.9 % sodium chloride infusion   IntraVENous PRN Howie Arce MD        ondansetron (ZOFRAN-ODT) disintegrating tablet 4 mg  4 mg Oral Q8H PRN Howie Arce MD   4 mg at 24 0432    Or    ondansetron (ZOFRAN) injection 4 mg  4 mg IntraVENous Q6H PRN Howie Arce MD        lactated ringers

## 2024-04-24 NOTE — CONSULTS
[facility]   MEDICAL CONSULT      AUTHOR: Doreen Ceron MD PATIENT NAME: Zuri Boggs   DATE: 2024 MRN: [id number], : 1960   Primary Care Physician: No primary care provider on file.     Reason for consult: Continued medical management/optimization for a patient admitted under surgical service.    HPI:   This is a 63 y.o. female with significant PMH as outline below, presented with abdominal pain, nausea, vomiting, diarrhea and currently managed for SOB related Gall stone. On direct questioning, patient denied any  resting ongoing chest pain, resting SOB, hemoptysis, productive cough, fever, ongoing palpitation, hematemesis, rectal bleeding, derik, hematuria, any other  and GI complaints, and any new focal neuro deficits .    ROS:  Pertinent positives and negatives are noted in the HPI, all other systems are reviewed and negative    PMH:  No past medical history on file.    Surgical History:  No past surgical history on file.    Medications Prior to Admission:    Prior to Admission medications    Medication Sig Start Date End Date Taking? Authorizing Provider   metFORMIN (GLUCOPHAGE) 500 MG tablet Take 1 tablet by mouth 2 times daily (with meals)   Yes Chelly Walker MD   levothyroxine (SYNTHROID) 25 MCG tablet Take 1 tablet by mouth Daily   Yes Chelly Walker MD   atorvastatin (LIPITOR) 20 MG tablet Take 1 tablet by mouth daily   Yes Chelly Walker MD       Allergies:    Patient has no known allergies.    Social History:    reports that she has never smoked. She does not have any smokeless tobacco history on file.      Family History:   family history is not on file.      PHYSICAL EXAM:  Vitals:  BP (!) 93/53   Pulse (!) 106   Temp 99 °F (37.2 °C) (Temporal)   Resp 18   Ht 1.499 m (4' 11\")   Wt 74.8 kg (165 lb)   SpO2 98%   BMI 33.33 kg/m²   GENERAL: No acute distress, Alert and awake, Afebrile   HEENT: PERRLA, no icterus.  OP clear and no exudates .  NECK: Supple  no carotid/ophthalmic bruits, JVD None.  RESPIRATORY:  Bilateral equal vesicular breath sound with no wheezing. Lung bases are clear.    HEART: Normal S1 and S2, No added sounds.   ABDOMEN: Soft, mildly distended with generalized abdominal pain and tenderness.   EXTREMITIES: All peripheral pulses are present. No calf tenderness or swelling. No pedal edema is present..  NEUROLOGY: Alert and awake. No new focal neuro deficit.     LABS:  Recent Labs     04/23/24  1531   WBC 12.1*   RBC 4.91   HGB 11.8   HCT 37.6   MCV 76.6*   MCH 24.0*   MCHC 31.4*   RDW 15.8*   *   MPV 9.7     Recent Labs     04/23/24  1531      K 4.8   CL 94*   CO2 16*   BUN 55*   CREATININE 2.4*   GLUCOSE 238*   CALCIUM 9.1     Recent Labs     04/23/24  2244   POCGLU 177*     Results for orders placed or performed during the hospital encounter of 04/23/24   Culture, Blood 1    Specimen: Blood   Result Value Ref Range    Specimen Description .BLOOD     Special Requests          Culture NO GROWTH <24 HRS    Culture, Blood 2    Specimen: Blood   Result Value Ref Range    Specimen Description .BLOOD     Special Requests          Culture NO GROWTH <24 HRS    CBC with Auto Differential   Result Value Ref Range    WBC 12.1 (H) 4.5 - 11.5 k/uL    RBC 4.91 3.50 - 5.50 m/uL    Hemoglobin 11.8 11.5 - 15.5 g/dL    Hematocrit 37.6 34.0 - 48.0 %    MCV 76.6 (L) 80.0 - 99.9 fL    MCH 24.0 (L) 26.0 - 35.0 pg    MCHC 31.4 (L) 32.0 - 34.5 g/dL    RDW 15.8 (H) 11.5 - 15.0 %    Platelets 522 (H) 130 - 450 k/uL    MPV 9.7 7.0 - 12.0 fL    Neutrophils % 73 43.0 - 80.0 %    Lymphocytes % 10 (L) 20.0 - 42.0 %    Monocytes % 15 (H) 2.0 - 12.0 %    Eosinophils % 0 0 - 6 %    Basophils % 0 0.0 - 2.0 %    Metamyelocytes 1 0 - 1 %    Myelocytes 1 (H) 0 %    Neutrophils Absolute 8.83 (H) 1.80 - 7.30 k/uL    Lymphocytes Absolute 1.21 (L) 1.50 - 4.00 k/uL    Monocytes Absolute 1.82 (H) 0.10 - 0.95 k/uL    Eosinophils Absolute

## 2024-04-24 NOTE — PROGRESS NOTES
4 Eyes Skin Assessment     NAME:  Zuri Boggs  YOB: 1960  MEDICAL RECORD NUMBER:  [id number]    The patient is being assessed for  Admission    I agree that at least one RN has performed a thorough Head to Toe Skin Assessment on the patient. ALL assessment sites listed below have been assessed.      Areas assessed by both nurses:    Head, Face, Ears, Shoulders, Back, Chest, Arms, Elbows, Hands, Sacrum. Buttock, Coccyx, Ischium, and Legs. Feet and Heels        Does the Patient have a Wound? No noted wound(s)       Caio Prevention initiated by RN: No  Wound Care Orders initiated by RN: No    Pressure Injury (Stage 3,4, Unstageable, DTI, NWPT, and Complex wounds) if present, place Wound referral order by RN under : No    New Ostomies, if present place, Ostomy referral order under : No     Nurse 1 eSignature: Electronically signed by Maycol Machado RN on 4/23/24 at 10:56 PM EDT    **SHARE this note so that the co-signing nurse can place an eSignature**    Nurse 2 eSignature: {Esignature:[id number]}

## 2024-04-24 NOTE — OP NOTE
Operative Note      Patient: Zuri Boggs  YOB: 1960  MRN: [id number]    Date of Procedure: 4/24/2024    Pre-Op Diagnosis Codes:     * Gallstone ileus (HCC) [K56.3]    Post-Op Diagnosis: Same       Procedure(s):  EXPLORATORY LAPAROSCOPY SMALL  BOWEL RESECTION and removal of intestinal foreign body    Surgeon(s):  Howie Arce MD    Assistant:   First Assistant: Abhay Nesbitt    Anesthesia: Choice    Estimated Blood Loss (mL): less than 50     Complications: None    Specimens:   ID Type Source Tests Collected by Time Destination   A : Small bowel Tissue Tissue SURGICAL PATHOLOGY Howie Arce MD 4/24/2024 1003        Implants:  * No implants in log *      Drains:   Urinary Catheter 04/24/24 (Active)       Findings:  As dictated      Detailed Description of Procedure:   030357    Electronically signed by Howie Arce MD on 4/24/2024 at 10:19 AM

## 2024-04-24 NOTE — PROGRESS NOTES
4 Eyes Skin Assessment     NAME:  Zuri Boggs  YOB: 1960  MEDICAL RECORD NUMBER:  [id number]    The patient is being assessed for  {Reason for Assessment:39024}    I agree that at least one RN has performed a thorough Head to Toe Skin Assessment on the patient. ALL assessment sites listed below have been assessed.      Areas assessed by both nurses:    Head, Face, Ears, Shoulders, Back, Chest, Arms, Elbows, Hands, Sacrum. Buttock, Coccyx, Ischium, and Legs. Feet and Heels        Does the Patient have a Wound? No noted wound(s)       Caio Prevention initiated by RN: No  Wound Care Orders initiated by RN: No    Pressure Injury (Stage 3,4, Unstageable, DTI, NWPT, and Complex wounds) if present, place Wound referral order by RN under : No    New Ostomies, if present place, Ostomy referral order under : No     Nurse 1 eSignature: Electronically signed by Maycol Machado RN on 4/23/24 at 10:49 PM EDT    **SHARE this note so that the co-signing nurse can place an eSignature**    Nurse 2 eSignature: Electronically signed by Shelby Acosta LPN on 4/23/24 at 10:51 PM EDT

## 2024-04-24 NOTE — CARE COORDINATION
Case Management Assessment  Initial Evaluation    Date/Time of Evaluation: 4/24/2024 1:09 PM  Assessment Completed by: Ml Wright RN    If patient is discharged prior to next notation, then this note serves as note for discharge by case management.    Patient Name: Zuri Boggs                   YOB: 1960  Diagnosis: Obstruction of intestine by gallstone (HCC) [K56.3]  Gallstone ileus (HCC) [K56.3]                   Date / Time: 4/23/2024  3:07 PM    Patient Admission Status: Inpatient   Readmission Risk (Low < 19, Mod (19-27), High > 27): No data recorded  Current PCP: No primary care provider on file.  PCP verified by CM? Yes    Chart Reviewed: Yes      History Provided by: Patient  Patient Orientation: Alert and Oriented, Person, Place, Situation    Patient Cognition: Alert    Hospitalization in the last 30 days (Readmission):  No      Advance Directives:      Code Status: Full Code   Patient's Primary Decision Maker is: Patient Declined (Legal Next of Kin Remains as Decision Maker)    Primary Decision Maker: Juliano Boggs - Spouse - [phone]    Discharge Planning:    Patient lives with: Spouse/Significant Other Type of Home: House  Primary Care Giver: Self  Patient Support Systems include: Spouse/Significant Other, Children   Current Financial resources:    Current community resources:    Current services prior to admission: None            Current DME:              Type of Home Care services:  None    ADLS  Prior functional level: Independent in ADLs/IADLs  Current functional level: Independent in ADLs/IADLs    PT AM-PAC:   /24  OT AM-PAC:   /24    Family can provide assistance at DC: Yes  Would you like Case Management to discuss the discharge plan with any other family members/significant others, and if so, who? No  Plans to Return to Present Housing: Yes    Potential Assistance needed at discharge: N/A            Potential DME:    Patient expects to discharge to: House  Plan for  transportation at discharge:      Financial    Payor: [employer] / Plan: [employer] / Product Type: *No Product type* /       Potential assistance Purchasing Medications:    Meds-to-Beds request: Yes      [id number] [place] - [address] - P [phone] - F [phone]  [address] [place] [address]  Phone: [phone] Fax: [phone]      Notes:        Additional Case Management Notes: 4/24/2024 1309 CM note: Met with patient for transition of care needs. Pt is independent and resides with her . She had lap sb resection today. Pt plans to return home at d/c and family will provide transportation. CM to follow for transition of care needs. Frank TALBOT    The Plan for Transition of Care is related to the following treatment goals of Obstruction of intestine by gallstone (HCC) [K56.3]  Gallstone ileus (HCC) [K56.3]          The Patient and/or Patient Representative Agree with the Discharge Plan? yes     Ml Wright RN  Case Management Department

## 2024-04-24 NOTE — ACP (ADVANCE CARE PLANNING)
Advance Care Planning   Healthcare Decision Maker:    Primary Decision Maker: Juliano Boggs - [facility] - [phone]

## 2024-04-24 NOTE — PROGRESS NOTES
Pharmacist Review and Automatic Dose Adjustment of Prophylactic Enoxaparin    Reviewed reason(s) for admission/hospital problem list    The reviewing pharmacist has made an adjustment to the ordered enoxaparin dose or converted to UFH per the approved [facility] protocol and table as identified below.        Zuri Boggs is a 63 y.o. female.     Recent Labs     04/23/24  1531   CREATININE 2.4*       Estimated Creatinine Clearance: 23 mL/min (A) (based on SCr of 2.4 mg/dL (H)).    Recent Labs     04/23/24  1531   HGB 11.8   HCT 37.6   *     No results for input(s): \"INR\" in the last 72 hours.    Height:   Ht Readings from Last 1 Encounters:   04/23/24 1.499 m (4' 11\")     Weight:  Wt Readings from Last 1 Encounters:   04/23/24 74.8 kg (165 lb)               Plan: Based upon the patient's weight and renal function    Ordered: Enoxaparin 40mg SUBQ Daily    Changed/converted to    New Order: Enoxaparin 30mg SUBQ Daily      Thank you,  Carola Weeks [facility]  4/23/2024, 9:39 PM

## 2024-04-24 NOTE — OP NOTE
[address] [address]                            OPERATIVE REPORT      PATIENT NAME: RICHAR SILVA                : 1960  MED REC NO: [id number]                        ROOM: [facility]  ACCOUNT NO: [id number]                       ADMIT DATE: 2024  PROVIDER: Howie Arce MD      DATE OF PROCEDURE:  2024    SURGEON:  Howie Arce MD    PREOPERATIVE DIAGNOSIS:  Gallstone ileus.    POSTOPERATIVE DIAGNOSIS:  Gallstone ileus.    PROCEDURES:  Laparoscopic small bowel resection and removal of intestinal foreign body.  Mobilization and placement of omental patch.    ASSISTANT:  Vineet Nesbitt.    ANESTHESIA:  General.    COMPLICATIONS:  None.    FLUIDS:  Crystalloid.    BLOOD LOSS:  20 mL.    DISPOSITION:  Admitted to the floor for routine postoperative care.    INDICATIONS:  This is a 63-year-old female with the aforementioned diagnosis.  I explained the risks, benefits, potential outcomes, and alternative treatment to the aforementioned procedure and she agreed to proceed understanding those risks and potential outcomes.    SPECIMENS:  Small bowel and small bowel foreign body appreciated.    DESCRIPTION OF PROCEDURE:  The patient was brought to the operative suite, placed under general anesthesia.  Bilateral PCDs were placed, given preoperative antibiotics within 30 minutes of incision.  Was then prepped and draped in normal sterile condition.  Once this was done, local anesthetic was infiltrated in the supraumbilical area.  A 5 mm incision was made.  A Veress needle was passed into the peritoneum.  CO2 was used to insufflate the abdomen to a pressure of 15 mmHg.  At this point in time, the Veress needle was removed and a 5 mm trocar was put in its place.  Two additional 5 mm trocars were placed in the right lateral abdomen.  We were able to run the small bowel in the distal jejunum, proximal ileum.  There was a very

## 2024-04-25 ENCOUNTER — APPOINTMENT (OUTPATIENT)
Dept: MRI IMAGING | Age: 64
DRG: 330 | End: 2024-04-25
Payer: COMMERCIAL

## 2024-04-25 PROBLEM — E83.39 HYPOPHOSPHATEMIA: Status: ACTIVE | Noted: 2024-04-25

## 2024-04-25 LAB
ALBUMIN SERPL-MCNC: 2.7 G/DL (ref 3.5–5.2)
ALP SERPL-CCNC: 99 U/L (ref 35–104)
ALT SERPL-CCNC: 7 U/L (ref 0–32)
ANION GAP SERPL CALCULATED.3IONS-SCNC: 11 MMOL/L (ref 7–16)
AST SERPL-CCNC: 9 U/L (ref 0–31)
BASOPHILS # BLD: 0 K/UL (ref 0–0.2)
BASOPHILS NFR BLD: 0 % (ref 0–2)
BILIRUB DIRECT SERPL-MCNC: <0.2 MG/DL (ref 0–0.3)
BILIRUB INDIRECT SERPL-MCNC: ABNORMAL MG/DL (ref 0–1)
BILIRUB SERPL-MCNC: 0.4 MG/DL (ref 0–1.2)
BUN SERPL-MCNC: 20 MG/DL (ref 6–23)
CALCIUM SERPL-MCNC: 8.3 MG/DL (ref 8.6–10.2)
CHLORIDE SERPL-SCNC: 103 MMOL/L (ref 98–107)
CO2 SERPL-SCNC: 21 MMOL/L (ref 22–29)
CREAT SERPL-MCNC: 0.7 MG/DL (ref 0.5–1)
EOSINOPHIL # BLD: 0.05 K/UL (ref 0.05–0.5)
EOSINOPHILS RELATIVE PERCENT: 1 % (ref 0–6)
ERYTHROCYTE [DISTWIDTH] IN BLOOD BY AUTOMATED COUNT: 15.7 % (ref 11.5–15)
ERYTHROCYTE [DISTWIDTH] IN BLOOD BY AUTOMATED COUNT: 15.8 % (ref 11.5–15)
GFR SERPL CREATININE-BSD FRML MDRD: >90 ML/MIN/1.73M2
GLUCOSE BLD-MCNC: 157 MG/DL (ref 74–99)
GLUCOSE BLD-MCNC: 161 MG/DL (ref 74–99)
GLUCOSE BLD-MCNC: 164 MG/DL (ref 74–99)
GLUCOSE BLD-MCNC: 204 MG/DL (ref 74–99)
GLUCOSE SERPL-MCNC: 187 MG/DL (ref 74–99)
HCT VFR BLD AUTO: 22.8 % (ref 34–48)
HCT VFR BLD AUTO: 23.6 % (ref 34–48)
HGB BLD-MCNC: 7.4 G/DL (ref 11.5–15.5)
HGB BLD-MCNC: 7.4 G/DL (ref 11.5–15.5)
LYMPHOCYTES NFR BLD: 0.74 K/UL (ref 1.5–4)
LYMPHOCYTES RELATIVE PERCENT: 14 % (ref 20–42)
MAGNESIUM SERPL-MCNC: 1.9 MG/DL (ref 1.6–2.6)
MCH RBC QN AUTO: 23.8 PG (ref 26–35)
MCH RBC QN AUTO: 24.4 PG (ref 26–35)
MCHC RBC AUTO-ENTMCNC: 31.4 G/DL (ref 32–34.5)
MCHC RBC AUTO-ENTMCNC: 32.5 G/DL (ref 32–34.5)
MCV RBC AUTO: 75.2 FL (ref 80–99.9)
MCV RBC AUTO: 75.9 FL (ref 80–99.9)
METAMYELOCYTES ABSOLUTE COUNT: 0.05 K/UL (ref 0–0.12)
METAMYELOCYTES: 1 % (ref 0–1)
MICROORGANISM SPEC CULT: ABNORMAL
MICROORGANISM SPEC CULT: ABNORMAL
MONOCYTES NFR BLD: 0.78 K/UL (ref 0.1–0.95)
MONOCYTES NFR BLD: 15 % (ref 2–12)
NEUTROPHILS NFR BLD: 70 % (ref 43–80)
NEUTS SEG NFR BLD: 3.69 K/UL (ref 1.8–7.3)
PHOSPHATE SERPL-MCNC: 1.2 MG/DL (ref 2.5–4.5)
PLATELET # BLD AUTO: 275 K/UL (ref 130–450)
PLATELET # BLD AUTO: 279 K/UL (ref 130–450)
PMV BLD AUTO: 10.1 FL (ref 7–12)
PMV BLD AUTO: 9.9 FL (ref 7–12)
POTASSIUM SERPL-SCNC: 4.1 MMOL/L (ref 3.5–5)
PROT SERPL-MCNC: 5.9 G/DL (ref 6.4–8.3)
RBC # BLD AUTO: 3.03 M/UL (ref 3.5–5.5)
RBC # BLD AUTO: 3.11 M/UL (ref 3.5–5.5)
RBC # BLD: NORMAL 10*6/UL
SODIUM SERPL-SCNC: 135 MMOL/L (ref 132–146)
SPECIMEN DESCRIPTION: ABNORMAL
WBC OTHER # BLD: 5.3 K/UL (ref 4.5–11.5)
WBC OTHER # BLD: 5.7 K/UL (ref 4.5–11.5)

## 2024-04-25 PROCEDURE — 6360000002 HC RX W HCPCS: Performed by: INTERNAL MEDICINE

## 2024-04-25 PROCEDURE — 6360000002 HC RX W HCPCS: Performed by: SURGERY

## 2024-04-25 PROCEDURE — 99232 SBSQ HOSP IP/OBS MODERATE 35: CPT | Performed by: INTERNAL MEDICINE

## 2024-04-25 PROCEDURE — 2580000003 HC RX 258: Performed by: INTERNAL MEDICINE

## 2024-04-25 PROCEDURE — 85025 COMPLETE CBC W/AUTO DIFF WBC: CPT

## 2024-04-25 PROCEDURE — 80053 COMPREHEN METABOLIC PANEL: CPT

## 2024-04-25 PROCEDURE — 2500000003 HC RX 250 WO HCPCS: Performed by: INTERNAL MEDICINE

## 2024-04-25 PROCEDURE — 74183 MRI ABD W/O CNTR FLWD CNTR: CPT

## 2024-04-25 PROCEDURE — 83735 ASSAY OF MAGNESIUM: CPT

## 2024-04-25 PROCEDURE — 99024 POSTOP FOLLOW-UP VISIT: CPT | Performed by: SURGERY

## 2024-04-25 PROCEDURE — A9577 INJ MULTIHANCE: HCPCS | Performed by: RADIOLOGY

## 2024-04-25 PROCEDURE — 36415 COLL VENOUS BLD VENIPUNCTURE: CPT

## 2024-04-25 PROCEDURE — 6360000004 HC RX CONTRAST MEDICATION: Performed by: RADIOLOGY

## 2024-04-25 PROCEDURE — 84100 ASSAY OF PHOSPHORUS: CPT

## 2024-04-25 PROCEDURE — 1200000000 HC SEMI PRIVATE

## 2024-04-25 PROCEDURE — 85027 COMPLETE CBC AUTOMATED: CPT

## 2024-04-25 PROCEDURE — 6370000000 HC RX 637 (ALT 250 FOR IP): Performed by: SURGERY

## 2024-04-25 PROCEDURE — 6370000000 HC RX 637 (ALT 250 FOR IP): Performed by: INTERNAL MEDICINE

## 2024-04-25 PROCEDURE — 82248 BILIRUBIN DIRECT: CPT

## 2024-04-25 PROCEDURE — 82962 GLUCOSE BLOOD TEST: CPT

## 2024-04-25 RX ORDER — INSULIN GLARGINE 100 [IU]/ML
5 INJECTION, SOLUTION SUBCUTANEOUS NIGHTLY
Status: DISCONTINUED | OUTPATIENT
Start: 2024-04-25 | End: 2024-04-27 | Stop reason: HOSPADM

## 2024-04-25 RX ORDER — LEVOTHYROXINE SODIUM 0.03 MG/1
25 TABLET ORAL DAILY
Status: DISCONTINUED | OUTPATIENT
Start: 2024-04-25 | End: 2024-04-27 | Stop reason: HOSPADM

## 2024-04-25 RX ADMIN — METHOCARBAMOL 500 MG: 500 TABLET ORAL at 22:15

## 2024-04-25 RX ADMIN — INSULIN LISPRO 1 UNITS: 100 INJECTION, SOLUTION INTRAVENOUS; SUBCUTANEOUS at 17:17

## 2024-04-25 RX ADMIN — ENOXAPARIN SODIUM 40 MG: 100 INJECTION SUBCUTANEOUS at 08:01

## 2024-04-25 RX ADMIN — LEVOTHYROXINE SODIUM 25 MCG: 25 TABLET ORAL at 17:16

## 2024-04-25 RX ADMIN — PIPERACILLIN AND TAZOBACTAM 3375 MG: 3; .375 INJECTION, POWDER, LYOPHILIZED, FOR SOLUTION INTRAVENOUS at 06:59

## 2024-04-25 RX ADMIN — DIBASIC SODIUM PHOSPHATE, MONOBASIC POTASSIUM PHOSPHATE AND MONOBASIC SODIUM PHOSPHATE 2 TABLET: 852; 155; 130 TABLET ORAL at 17:28

## 2024-04-25 RX ADMIN — INSULIN GLARGINE 5 UNITS: 100 INJECTION, SOLUTION SUBCUTANEOUS at 22:12

## 2024-04-25 RX ADMIN — SODIUM PHOSPHATE, MONOBASIC, MONOHYDRATE AND SODIUM PHOSPHATE, DIBASIC, ANHYDROUS 30 MMOL: 142; 276 INJECTION, SOLUTION INTRAVENOUS at 17:34

## 2024-04-25 RX ADMIN — METHOCARBAMOL 500 MG: 500 TABLET ORAL at 14:38

## 2024-04-25 RX ADMIN — PIPERACILLIN AND TAZOBACTAM 3375 MG: 3; .375 INJECTION, POWDER, LYOPHILIZED, FOR SOLUTION INTRAVENOUS at 23:59

## 2024-04-25 RX ADMIN — METHOCARBAMOL 500 MG: 500 TABLET ORAL at 07:56

## 2024-04-25 RX ADMIN — METHOCARBAMOL 500 MG: 500 TABLET ORAL at 17:16

## 2024-04-25 RX ADMIN — GADOBENATE DIMEGLUMINE 15 ML: 529 INJECTION, SOLUTION INTRAVENOUS at 10:03

## 2024-04-25 ASSESSMENT — PAIN SCALES - GENERAL
PAINLEVEL_OUTOF10: 0

## 2024-04-25 NOTE — PROGRESS NOTES
Pharmacy Medication Reconciliation    The patient was interviewed regarding current PTA medication list, use and drug allergies. Patient was accompanied by visitor . The patient was questioned regarding use of any other inhalers, topical products, over the counter medications, herbal medications, vitamin products or ophthalmic/nasal/otic medication use.      Allergy Update: Patient has no known allergies.    Recommendations/Findings/Discrepancies:   The following amendments were made to the patient's active medication list on file:   1) Additions: 1    Cyanocobalamin PO three times weekly    2) Deletions: 0    3) Changes: 0    Total number of discrepancies: 1    Source/s of information: Reviewed medications at bedside.     Thank you,     Alejandro Hernandez, PharmD, [facility]  PGY-1 Pharmacy Resident  [phone]    4/25/2024, 11:05 AM

## 2024-04-25 NOTE — PROGRESS NOTES
[facility] Hospitalist   Progress Note    Admitting Date and Time: 4/23/2024  3:07 PM  Admit Dx: Obstruction of intestine by gallstone (HCC) [K56.3]  Gallstone ileus (HCC) [K56.3]    Subjective:    4/24: Pt admitted earlier this morning with gallstone ileus and possible intraintestinal foreign body.  She had laparoscopic small bowel resection and removal of intestinal foreign body this morning without immediate complications.    At this time she is awake, alert, but appears very fatigued.  Vital signs significant for persistent tachycardia.  Clinically she appears dehydrated.    4/25: Patient is awake and alert, pain well-controlled.  Heart rate improved.  Blood cultures no growth to date.  MRCP done which shows probable fistula between gallbladder and duodenum.    ROS: denies fever, chills, cp, sob, n/v, HA unless stated above.     levothyroxine  25 mcg Oral Daily    sodium phosphate IVPB (PERIPHERAL line)  30 mmol IntraVENous Once    phosphorus  500 mg Oral BID    insulin lispro  0-4 Units SubCUTAneous Q4H    methocarbamol  500 mg Oral 4x Daily    enoxaparin  40 mg SubCUTAneous Daily    piperacillin-tazobactam  3,375 mg IntraVENous Q8H    sodium chloride flush  5-40 mL IntraVENous 2 times per day     glucose, 4 tablet, PRN  dextrose bolus, 125 mL, PRN   Or  dextrose bolus, 250 mL, PRN  glucagon (rDNA), 1 mg, PRN  dextrose, , Continuous PRN  traMADol, 25 mg, Q6H PRN   Or  traMADol, 50 mg, Q6H PRN  sodium chloride flush, 5-40 mL, PRN  sodium chloride, , PRN  ondansetron, 4 mg, Q8H PRN   Or  ondansetron, 4 mg, Q6H PRN  morphine, 2 mg, Q2H PRN   Or  morphine, 4 mg, Q2H PRN         Objective:    /62   Pulse 98   Temp 98.2 °F (36.8 °C) (Oral)   Resp 23   Ht 1.499 m (4' 11\")   Wt 74.8 kg (165 lb)   SpO2 96%   BMI 33.33 kg/m²   General Appearance: Awake, alert, oriented x 3 but appears very fatigued  Skin: warm and dry  Head: normocephalic and atraumatic  Eyes: pupils equal, round, and

## 2024-04-25 NOTE — PROGRESS NOTES
Surgery Progress Note            Chief complaint:   Chief Complaint   Patient presents with    Emesis    Abdominal Pain     LOWER ABD PAIN SINCE SUNDAY/ DIARRHEA      Patient Active Problem List   Diagnosis    Obstruction of intestine by gallstone (HCC)    Gallstone ileus (HCC)    Nausea vomiting and diarrhea    SIRS (systemic inflammatory response syndrome) (HCC)       S: doing well    O:   Vitals:    04/25/24 0315   BP:    Pulse: 98   Resp:    Temp:    SpO2:        Intake/Output Summary (Last 24 hours) at 4/25/2024 0750  Last data filed at 4/25/2024 0610  Gross per 24 hour   Intake 500 ml   Output 3315 ml   Net -2815 ml           Labs:  Lab Results   Component Value Date/Time    WBC 3.6 04/24/2024 08:48 AM    WBC 12.1 04/23/2024 03:31 PM    HGB 9.7 04/24/2024 08:48 AM    HGB 11.8 04/23/2024 03:31 PM    HCT 31.5 04/24/2024 08:48 AM    HCT 37.6 04/23/2024 03:31 PM     Lab Results   Component Value Date    CREATININE 1.2 (H) 04/24/2024    BUN 51 (H) 04/24/2024     04/24/2024    K 4.3 04/24/2024     04/24/2024    CO2 15 (L) 04/24/2024     Lab Results   Component Value Date/Time    LIPASE 15 04/23/2024 03:31 PM         Physical exam:   BP (!) 102/58   Pulse 98   Temp 97.4 °F (36.3 °C) (Oral)   Resp 23   Ht 1.499 m (4' 11\")   Wt 74.8 kg (165 lb)   SpO2 93%   BMI 33.33 kg/m²   General appearance: NAD  Head: NCAT  Neck: supple, no masses  Lungs: equal chest rise bilateral  Heart: S1S2 present  Abdomen: soft, minimally tender, nondistended,  Incisions c/d/i  Skin; no lesions  Gu: no cva tenderness  Extremities: extremities normal, atraumatic, no cyanosis or edema    A:  POD # 1 Laparoscopic small bowel resection and removal of intestinal foreign body. Mobilization and placement of omental patch.     P: doing well, clear liquids, will get mrcp to evaluate biliary system     Howie Arce MD  4/25/2024

## 2024-04-25 NOTE — CARE COORDINATION
4/25/2024 1416 CM note: Pt is independent and resides with her . She had lap sb resection on 4/24/24, MRCP today and is on clear liquids. Pt plans to return home at d/c and family will provide transportation. Frank TALBOT   Fracture    A fracture is a break in one of your bones. This can occur from a variety of injuries, especially traumatic ones. Symptoms include pain, bruising, or swelling. Do not use the injured limb. If a fracture is in one of the bones below your waist, do not put weight on that limb unless instructed to do so by your healthcare provider. Crutches or a cane may have been provided. A splint or cast may have been applied by your health care provider. Make sure to keep it dry and follow up with an orthopedist as instructed.    SEEK IMMEDIATE MEDICAL CARE IF YOU HAVE ANY OF THE FOLLOWING SYMPTOMS: numbness, tingling, increasing pain, or weakness in any part of the injured limb.

## 2024-04-26 ENCOUNTER — TELEPHONE (OUTPATIENT)
Dept: BREAST CENTER | Age: 64
End: 2024-04-26

## 2024-04-26 LAB
ALBUMIN SERPL-MCNC: 3 G/DL (ref 3.5–5.2)
ALP SERPL-CCNC: 92 U/L (ref 35–104)
ALT SERPL-CCNC: 5 U/L (ref 0–32)
ANION GAP SERPL CALCULATED.3IONS-SCNC: 9 MMOL/L (ref 7–16)
AST SERPL-CCNC: 9 U/L (ref 0–31)
BASOPHILS # BLD: 0.02 K/UL (ref 0–0.2)
BASOPHILS NFR BLD: 0 % (ref 0–2)
BILIRUB DIRECT SERPL-MCNC: <0.2 MG/DL (ref 0–0.3)
BILIRUB INDIRECT SERPL-MCNC: ABNORMAL MG/DL (ref 0–1)
BILIRUB SERPL-MCNC: 0.3 MG/DL (ref 0–1.2)
BUN SERPL-MCNC: 15 MG/DL (ref 6–23)
CALCIUM SERPL-MCNC: 8.1 MG/DL (ref 8.6–10.2)
CHLORIDE SERPL-SCNC: 105 MMOL/L (ref 98–107)
CO2 SERPL-SCNC: 25 MMOL/L (ref 22–29)
CREAT SERPL-MCNC: 0.6 MG/DL (ref 0.5–1)
EOSINOPHIL # BLD: 0.07 K/UL (ref 0.05–0.5)
EOSINOPHILS RELATIVE PERCENT: 1 % (ref 0–6)
ERYTHROCYTE [DISTWIDTH] IN BLOOD BY AUTOMATED COUNT: 15.9 % (ref 11.5–15)
GFR SERPL CREATININE-BSD FRML MDRD: >90 ML/MIN/1.73M2
GLUCOSE BLD-MCNC: 121 MG/DL (ref 74–99)
GLUCOSE BLD-MCNC: 125 MG/DL (ref 74–99)
GLUCOSE BLD-MCNC: 165 MG/DL (ref 74–99)
GLUCOSE BLD-MCNC: 166 MG/DL (ref 74–99)
GLUCOSE BLD-MCNC: 177 MG/DL (ref 74–99)
GLUCOSE SERPL-MCNC: 121 MG/DL (ref 74–99)
HCT VFR BLD AUTO: 23.6 % (ref 34–48)
HGB BLD-MCNC: 7.3 G/DL (ref 11.5–15.5)
IMM GRANULOCYTES # BLD AUTO: 0.14 K/UL (ref 0–0.58)
IMM GRANULOCYTES NFR BLD: 2 % (ref 0–5)
LYMPHOCYTES NFR BLD: 0.77 K/UL (ref 1.5–4)
LYMPHOCYTES RELATIVE PERCENT: 13 % (ref 20–42)
MAGNESIUM SERPL-MCNC: 2 MG/DL (ref 1.6–2.6)
MCH RBC QN AUTO: 23.6 PG (ref 26–35)
MCHC RBC AUTO-ENTMCNC: 30.9 G/DL (ref 32–34.5)
MCV RBC AUTO: 76.4 FL (ref 80–99.9)
MONOCYTES NFR BLD: 0.49 K/UL (ref 0.1–0.95)
MONOCYTES NFR BLD: 8 % (ref 2–12)
NEUTROPHILS NFR BLD: 75 % (ref 43–80)
NEUTS SEG NFR BLD: 4.54 K/UL (ref 1.8–7.3)
PHOSPHATE SERPL-MCNC: 2.7 MG/DL (ref 2.5–4.5)
PLATELET # BLD AUTO: 264 K/UL (ref 130–450)
PMV BLD AUTO: 9.6 FL (ref 7–12)
POTASSIUM SERPL-SCNC: 3.5 MMOL/L (ref 3.5–5)
PROT SERPL-MCNC: 6.1 G/DL (ref 6.4–8.3)
RBC # BLD AUTO: 3.09 M/UL (ref 3.5–5.5)
SODIUM SERPL-SCNC: 139 MMOL/L (ref 132–146)
WBC OTHER # BLD: 6 K/UL (ref 4.5–11.5)

## 2024-04-26 PROCEDURE — 80053 COMPREHEN METABOLIC PANEL: CPT

## 2024-04-26 PROCEDURE — 6370000000 HC RX 637 (ALT 250 FOR IP): Performed by: SURGERY

## 2024-04-26 PROCEDURE — 99232 SBSQ HOSP IP/OBS MODERATE 35: CPT | Performed by: INTERNAL MEDICINE

## 2024-04-26 PROCEDURE — 82248 BILIRUBIN DIRECT: CPT

## 2024-04-26 PROCEDURE — 6370000000 HC RX 637 (ALT 250 FOR IP): Performed by: INTERNAL MEDICINE

## 2024-04-26 PROCEDURE — 36415 COLL VENOUS BLD VENIPUNCTURE: CPT

## 2024-04-26 PROCEDURE — 82962 GLUCOSE BLOOD TEST: CPT

## 2024-04-26 PROCEDURE — 83735 ASSAY OF MAGNESIUM: CPT

## 2024-04-26 PROCEDURE — 2580000003 HC RX 258: Performed by: SURGERY

## 2024-04-26 PROCEDURE — 6360000002 HC RX W HCPCS: Performed by: SURGERY

## 2024-04-26 PROCEDURE — 2580000003 HC RX 258: Performed by: INTERNAL MEDICINE

## 2024-04-26 PROCEDURE — 99024 POSTOP FOLLOW-UP VISIT: CPT | Performed by: SURGERY

## 2024-04-26 PROCEDURE — 1200000000 HC SEMI PRIVATE

## 2024-04-26 PROCEDURE — 84100 ASSAY OF PHOSPHORUS: CPT

## 2024-04-26 PROCEDURE — 6360000002 HC RX W HCPCS: Performed by: INTERNAL MEDICINE

## 2024-04-26 PROCEDURE — 85025 COMPLETE CBC W/AUTO DIFF WBC: CPT

## 2024-04-26 RX ORDER — AMOXICILLIN AND CLAVULANATE POTASSIUM 875; 125 MG/1; MG/1
1 TABLET, FILM COATED ORAL EVERY 12 HOURS SCHEDULED
Qty: 8 TABLET | Refills: 0 | Status: SHIPPED | OUTPATIENT
Start: 2024-04-26 | End: 2024-04-30

## 2024-04-26 RX ORDER — AMOXICILLIN AND CLAVULANATE POTASSIUM 875; 125 MG/1; MG/1
1 TABLET, FILM COATED ORAL EVERY 12 HOURS SCHEDULED
Status: DISCONTINUED | OUTPATIENT
Start: 2024-04-26 | End: 2024-04-27 | Stop reason: HOSPADM

## 2024-04-26 RX ADMIN — AMOXICILLIN AND CLAVULANATE POTASSIUM 1 TABLET: 875; 125 TABLET, FILM COATED ORAL at 22:55

## 2024-04-26 RX ADMIN — METHOCARBAMOL 500 MG: 500 TABLET ORAL at 17:10

## 2024-04-26 RX ADMIN — METHOCARBAMOL 500 MG: 500 TABLET ORAL at 20:59

## 2024-04-26 RX ADMIN — METHOCARBAMOL 500 MG: 500 TABLET ORAL at 07:59

## 2024-04-26 RX ADMIN — SODIUM CHLORIDE, PRESERVATIVE FREE 10 ML: 5 INJECTION INTRAVENOUS at 08:00

## 2024-04-26 RX ADMIN — PIPERACILLIN AND TAZOBACTAM 3375 MG: 3; .375 INJECTION, POWDER, LYOPHILIZED, FOR SOLUTION INTRAVENOUS at 07:59

## 2024-04-26 RX ADMIN — PIPERACILLIN AND TAZOBACTAM 3375 MG: 3; .375 INJECTION, POWDER, LYOPHILIZED, FOR SOLUTION INTRAVENOUS at 14:28

## 2024-04-26 RX ADMIN — DIBASIC SODIUM PHOSPHATE, MONOBASIC POTASSIUM PHOSPHATE AND MONOBASIC SODIUM PHOSPHATE 2 TABLET: 852; 155; 130 TABLET ORAL at 08:00

## 2024-04-26 RX ADMIN — LEVOTHYROXINE SODIUM 25 MCG: 25 TABLET ORAL at 06:40

## 2024-04-26 RX ADMIN — ENOXAPARIN SODIUM 40 MG: 100 INJECTION SUBCUTANEOUS at 08:00

## 2024-04-26 RX ADMIN — SODIUM CHLORIDE, PRESERVATIVE FREE 10 ML: 5 INJECTION INTRAVENOUS at 22:56

## 2024-04-26 RX ADMIN — METHOCARBAMOL 500 MG: 500 TABLET ORAL at 12:37

## 2024-04-26 ASSESSMENT — PAIN SCALES - GENERAL
PAINLEVEL_OUTOF10: 0
PAINLEVEL_OUTOF10: 8
PAINLEVEL_OUTOF10: 2
PAINLEVEL_OUTOF10: 5
PAINLEVEL_OUTOF10: 4

## 2024-04-26 ASSESSMENT — PAIN DESCRIPTION - DESCRIPTORS
DESCRIPTORS: ACHING;NAGGING;DISCOMFORT
DESCRIPTORS: ACHING;PRESSURE;DISCOMFORT
DESCRIPTORS: ACHING;PRESSURE;DISCOMFORT
DESCRIPTORS: ACHING;DULL;DISCOMFORT

## 2024-04-26 ASSESSMENT — PAIN DESCRIPTION - ORIENTATION: ORIENTATION: MID

## 2024-04-26 ASSESSMENT — PAIN DESCRIPTION - LOCATION
LOCATION: ABDOMEN

## 2024-04-26 NOTE — PROGRESS NOTES
[facility] Hospitalist   Progress Note    Admitting Date and Time: 4/23/2024  3:07 PM  Admit Dx: Obstruction of intestine by gallstone (HCC) [K56.3]  Gallstone ileus (HCC) [K56.3]    Subjective:    4/24: Pt admitted earlier this morning with gallstone ileus and possible intraintestinal foreign body.  She had laparoscopic small bowel resection and removal of intestinal foreign body this morning without immediate complications.    At this time she is awake, alert, but appears very fatigued.  Vital signs significant for persistent tachycardia.  Clinically she appears dehydrated.    4/25: Patient is awake and alert, pain well-controlled.  Heart rate improved.  Blood cultures no growth to date.  MRCP done which shows probable fistula between gallbladder and duodenum.    4/26: Patient tolerating diet, postoperative pain well-controlled.    ROS: denies fever, chills, cp, sob, n/v, HA unless stated above.     levothyroxine  25 mcg Oral Daily    insulin glargine  5 Units SubCUTAneous Nightly    insulin lispro  0-4 Units SubCUTAneous Q4H    methocarbamol  500 mg Oral 4x Daily    enoxaparin  40 mg SubCUTAneous Daily    piperacillin-tazobactam  3,375 mg IntraVENous Q8H    sodium chloride flush  5-40 mL IntraVENous 2 times per day     glucose, 4 tablet, PRN  dextrose bolus, 125 mL, PRN   Or  dextrose bolus, 250 mL, PRN  glucagon (rDNA), 1 mg, PRN  dextrose, , Continuous PRN  traMADol, 25 mg, Q6H PRN   Or  traMADol, 50 mg, Q6H PRN  sodium chloride flush, 5-40 mL, PRN  sodium chloride, , PRN  ondansetron, 4 mg, Q8H PRN   Or  ondansetron, 4 mg, Q6H PRN  morphine, 2 mg, Q2H PRN   Or  morphine, 4 mg, Q2H PRN         Objective:    BP (!) 122/59   Pulse 84   Temp 98.3 °F (36.8 °C) (Oral)   Resp 18   Ht 1.499 m (4' 11\")   Wt 74.8 kg (165 lb)   SpO2 95%   BMI 33.33 kg/m²   General Appearance: Awake, alert, oriented x 3 but appears very fatigued  Skin: warm and dry  Head: normocephalic and atraumatic  Eyes: pupils  equal, round, and reactive to light, extraocular eye movements intact, conjunctivae normal  Neck: neck supple and non tender without mass   Pulmonary/Chest: normal effort on room air.  Clear to auscultation bilaterally without wheezes, crackles, or rhonchi  Cardiovascular: Normal rate, regular rhythm, S1, S2, no JVD  Abdomen: laparoscopic incisions clean, dry, intact. Normal bowel sounds  Extremities: no cyanosis, no clubbing and no edema  Neurologic: no cranial nerve deficit and speech normal      Recent Labs     04/24/24  0848 04/25/24  1416 04/26/24  0644    135 139   K 4.3 4.1 3.5    103 105   CO2 15* 21* 25   BUN 51* 20 15   CREATININE 1.2* 0.7 0.6   GLUCOSE 202* 187* 121*   CALCIUM 7.6* 8.3* 8.1*         Recent Labs     04/24/24  0848 04/25/24  1416 04/26/24  0644   ALKPHOS 109* 99 92   PROT 6.9 5.9* 6.1*   BILITOT 0.7 0.4 0.3   AST 9 9 9   ALT 8 7 5         Recent Labs     04/25/24  1217 04/25/24  1416 04/26/24  0644   WBC 5.3 5.7 6.0   RBC 3.11* 3.03* 3.09*   HGB 7.4* 7.4* 7.3*   HCT 23.6* 22.8* 23.6*   MCV 75.9* 75.2* 76.4*   MCH 23.8* 24.4* 23.6*   MCHC 31.4* 32.5 30.9*   RDW 15.7* 15.8* 15.9*    279 264   MPV 9.9 10.1 9.6         Radiology:   MRI ABDOMEN W WO CONTRAST MRCP   Final Result   1. Air-fluid level within the gallbladder with communication to the adjacent   duodenum consistent with fistula and gallstone ileus/obstruction as seen on   recent CT examination.   2. No biliary dilatation.   3. Borderline splenomegaly         US GALLBLADDER RUQ   Final Result   1. Cholelithiasis with gallbladder wall thickening up to 4.9 mm. Negative   sonographic Leger's sign.   2. Hepatomegaly at 18.3 cm.   3. Probable 1.8 cm lymph node adjacent pancreatic head and identified in   retrospect on prior CT scan.         CT ABDOMEN PELVIS WO CONTRAST Additional Contrast? None   Final Result   Addendum (preliminary) 1 of 1   ADDENDUM:   Probable 1.8 cm lymph node posterior to the pancreatic head.

## 2024-04-26 NOTE — CARE COORDINATION
4/26/2024 1136 CM note: Pt is independent, resides with her  and plans to return home at d/c.  She had lap sb resection on 4/24/24, MRCP today and remains on clear liquids. Frank TALBOT

## 2024-04-26 NOTE — TELEPHONE ENCOUNTER
RN contacted patient to notify we got a referral from  office for new diagnosis of Left breast IDC. Patient currently admitted in the hospital @ [facility]. Patient requested office to contact her back on Monday(4/29/24) or Tuesday(5/1/24) as she might get discharged over the weekend. RN verbalized understanding.       Electronically signed by Sonja Child RN on 4/26/24 at 2:08 PM EDT

## 2024-04-27 VITALS
TEMPERATURE: 98.1 F | DIASTOLIC BLOOD PRESSURE: 60 MMHG | HEIGHT: 59 IN | WEIGHT: 165 LBS | OXYGEN SATURATION: 97 % | HEART RATE: 80 BPM | SYSTOLIC BLOOD PRESSURE: 118 MMHG | BODY MASS INDEX: 33.26 KG/M2 | RESPIRATION RATE: 18 BRPM

## 2024-04-27 LAB
ALBUMIN SERPL-MCNC: 2.6 G/DL (ref 3.5–5.2)
ALP SERPL-CCNC: 70 U/L (ref 35–104)
ALT SERPL-CCNC: 6 U/L (ref 0–32)
ANION GAP SERPL CALCULATED.3IONS-SCNC: 12 MMOL/L (ref 7–16)
AST SERPL-CCNC: 12 U/L (ref 0–31)
BASOPHILS # BLD: 0 K/UL (ref 0–0.2)
BASOPHILS NFR BLD: 0 % (ref 0–2)
BILIRUB DIRECT SERPL-MCNC: <0.2 MG/DL (ref 0–0.3)
BILIRUB INDIRECT SERPL-MCNC: ABNORMAL MG/DL (ref 0–1)
BILIRUB SERPL-MCNC: 0.3 MG/DL (ref 0–1.2)
BUN SERPL-MCNC: 10 MG/DL (ref 6–23)
CALCIUM SERPL-MCNC: 8.3 MG/DL (ref 8.6–10.2)
CHLORIDE SERPL-SCNC: 103 MMOL/L (ref 98–107)
CO2 SERPL-SCNC: 22 MMOL/L (ref 22–29)
CREAT SERPL-MCNC: 0.5 MG/DL (ref 0.5–1)
EOSINOPHIL # BLD: 0 K/UL (ref 0.05–0.5)
EOSINOPHILS RELATIVE PERCENT: 0 % (ref 0–6)
ERYTHROCYTE [DISTWIDTH] IN BLOOD BY AUTOMATED COUNT: 15.7 % (ref 11.5–15)
GFR SERPL CREATININE-BSD FRML MDRD: >90 ML/MIN/1.73M2
GLUCOSE BLD-MCNC: 137 MG/DL (ref 74–99)
GLUCOSE SERPL-MCNC: 132 MG/DL (ref 74–99)
HCT VFR BLD AUTO: 24.3 % (ref 34–48)
HGB BLD-MCNC: 7.7 G/DL (ref 11.5–15.5)
LYMPHOCYTES NFR BLD: 0.96 K/UL (ref 1.5–4)
LYMPHOCYTES RELATIVE PERCENT: 17 % (ref 20–42)
MAGNESIUM SERPL-MCNC: 1.8 MG/DL (ref 1.6–2.6)
MCH RBC QN AUTO: 24.3 PG (ref 26–35)
MCHC RBC AUTO-ENTMCNC: 31.7 G/DL (ref 32–34.5)
MCV RBC AUTO: 76.7 FL (ref 80–99.9)
METAMYELOCYTES ABSOLUTE COUNT: 0.15 K/UL (ref 0–0.12)
METAMYELOCYTES: 3 % (ref 0–1)
MONOCYTES NFR BLD: 0.35 K/UL (ref 0.1–0.95)
MONOCYTES NFR BLD: 6 % (ref 2–12)
MYELOCYTES ABSOLUTE COUNT: 0.1 K/UL
MYELOCYTES: 2 %
NEUTROPHILS NFR BLD: 73 % (ref 43–80)
NEUTS SEG NFR BLD: 4.14 K/UL (ref 1.8–7.3)
PHOSPHATE SERPL-MCNC: 2.9 MG/DL (ref 2.5–4.5)
PLATELET # BLD AUTO: 281 K/UL (ref 130–450)
PMV BLD AUTO: 9.6 FL (ref 7–12)
POTASSIUM SERPL-SCNC: 3.3 MMOL/L (ref 3.5–5)
PROT SERPL-MCNC: 5.9 G/DL (ref 6.4–8.3)
RBC # BLD AUTO: 3.17 M/UL (ref 3.5–5.5)
RBC # BLD: ABNORMAL 10*6/UL
SODIUM SERPL-SCNC: 137 MMOL/L (ref 132–146)
WBC OTHER # BLD: 5.7 K/UL (ref 4.5–11.5)

## 2024-04-27 PROCEDURE — 83735 ASSAY OF MAGNESIUM: CPT

## 2024-04-27 PROCEDURE — 99024 POSTOP FOLLOW-UP VISIT: CPT | Performed by: SURGERY

## 2024-04-27 PROCEDURE — 82248 BILIRUBIN DIRECT: CPT

## 2024-04-27 PROCEDURE — 6370000000 HC RX 637 (ALT 250 FOR IP): Performed by: SURGERY

## 2024-04-27 PROCEDURE — 82962 GLUCOSE BLOOD TEST: CPT

## 2024-04-27 PROCEDURE — 6370000000 HC RX 637 (ALT 250 FOR IP): Performed by: INTERNAL MEDICINE

## 2024-04-27 PROCEDURE — 80053 COMPREHEN METABOLIC PANEL: CPT

## 2024-04-27 PROCEDURE — 36415 COLL VENOUS BLD VENIPUNCTURE: CPT

## 2024-04-27 PROCEDURE — 84100 ASSAY OF PHOSPHORUS: CPT

## 2024-04-27 PROCEDURE — 85025 COMPLETE CBC W/AUTO DIFF WBC: CPT

## 2024-04-27 RX ORDER — TRAMADOL HYDROCHLORIDE 50 MG/1
50 TABLET ORAL EVERY 6 HOURS PRN
Qty: 12 TABLET | Refills: 0 | Status: SHIPPED | OUTPATIENT
Start: 2024-04-27 | End: 2024-04-30

## 2024-04-27 RX ORDER — METHOCARBAMOL 500 MG/1
500 TABLET, FILM COATED ORAL 4 TIMES DAILY
Qty: 30 TABLET | Refills: 0 | Status: SHIPPED | OUTPATIENT
Start: 2024-04-27 | End: 2024-05-07

## 2024-04-27 RX ORDER — IBUPROFEN 800 MG/1
800 TABLET ORAL EVERY 6 HOURS PRN
Qty: 20 TABLET | Refills: 0 | Status: SHIPPED | OUTPATIENT
Start: 2024-04-27

## 2024-04-27 RX ADMIN — LEVOTHYROXINE SODIUM 25 MCG: 25 TABLET ORAL at 05:20

## 2024-04-27 RX ADMIN — METHOCARBAMOL 500 MG: 500 TABLET ORAL at 08:34

## 2024-04-27 RX ADMIN — AMOXICILLIN AND CLAVULANATE POTASSIUM 1 TABLET: 875; 125 TABLET, FILM COATED ORAL at 08:34

## 2024-04-27 ASSESSMENT — PAIN DESCRIPTION - LOCATION: LOCATION: ABDOMEN

## 2024-04-27 ASSESSMENT — PAIN DESCRIPTION - ORIENTATION: ORIENTATION: UPPER;MID

## 2024-04-27 ASSESSMENT — PAIN DESCRIPTION - DESCRIPTORS: DESCRIPTORS: ACHING;DISCOMFORT

## 2024-04-27 ASSESSMENT — PAIN SCALES - GENERAL: PAINLEVEL_OUTOF10: 6

## 2024-04-27 NOTE — DISCHARGE INSTRUCTIONS
Patient Discharge Instructions  Discharge Date:  4/27/2024    Discharged To:  Home    RESUME ACTIVITY:      BATHING:  May shower 24hrs after surgery, may bathe 3 days after surgery    DRIVING: No driving while on pain medications    RETURN TO WORK: after follow up appointment    WALKING:  Yes    SEXUAL ACTIVITY: Yes    STAIRS:  Yes    LIFTING: Less than 25 lbs for 2weeks then no more than 50lbs for the next two, then no limitations    DIET: common adult    BOWELS: constipation is a side effect of your pain meds, take a daily laxative (miralax, dulcolax, etc.) as needed to keep your bowels moving as they normally do, do not go 2-3 days without having a bowel movement.    SPECIAL INSTRUCTIONS:     Call the office at  if you have a fever > 100 F, or if your incision becomes red, tender, or drains more than a small amount of clear fluid.    Call  for follow up appointment with Dr. Arce in:  2weeks

## 2024-04-27 NOTE — DISCHARGE SUMMARY
Physician Discharge Summary     Zuri Boggs  [id number]    Admit date: 4/23/2024    Discharge date and time: No discharge date for patient encounter.     Admitting Physician: Howie Arce MD     Admission Diagnoses: Obstruction of intestine by gallstone (HCC) [K56.3]  Gallstone ileus (HCC) [K56.3]    Discharge diagnosis: same    Condition at discharge: stable        Hospital Course: Had uncomplicated lap sbo and foreign body removal for gallstone ileus and uncomplicated post operative course and was discharged tolerating a general diet, having good bowel function, ambulating independently and with adequate analgesia.    Discharge Exam: /60   Pulse 80   Temp 98.1 °F (36.7 °C) (Oral)   Resp 18   Ht 1.499 m (4' 11\")   Wt 74.8 kg (165 lb)   SpO2 97%   BMI 33.33 kg/m²     General appearance: alert, appears stated age and cooperative  Head: Normocephalic, without obvious abnormality, atraumatic  Neck: no adenopathy, no carotid bruit, no JVD, supple, symmetrical, trachea midline and thyroid not enlarged, symmetric, no tenderness/mass/nodules  Lungs: clear to auscultation bilaterally  Heart: regular rate and rhythm, S1, S2 normal, no murmur, click, rub or gallop  Abdomen: soft, non-tender; bowel sounds normal; no masses,  no organomegaly and incisions clean and dry  Extremities: extremities normal, atraumatic, no cyanosis or edema      Disposition: home    Patient Instructions:     Activity: activity as tolerated  Diet: regular diet  Wound Care: keep wound clean and dry    Follow-up with Dr. Arce in 2 weeks.  Over 30 min spent preparing discharge and discussing it and follow up instructions with patient.  Signed:  Howie Arce MD  4/27/2024  7:26 AM

## 2024-04-28 LAB
MICROORGANISM SPEC CULT: NORMAL
MICROORGANISM SPEC CULT: NORMAL
SERVICE CMNT-IMP: NORMAL
SERVICE CMNT-IMP: NORMAL
SPECIMEN DESCRIPTION: NORMAL
SPECIMEN DESCRIPTION: NORMAL

## 2024-04-29 LAB — SURGICAL PATHOLOGY REPORT: NORMAL

## 2024-04-30 ENCOUNTER — TELEPHONE (OUTPATIENT)
Dept: BREAST CENTER | Age: 64
End: 2024-04-30

## 2024-04-30 NOTE — TELEPHONE ENCOUNTER
Patient was referred by  for left breast IDC. Patient was scheduled on 5/7/2024 in [facility] office @ 9am with , patient advised to arrive 8am. Patient was instructed to bring a photo ID, insurance card (if applicable), and list of any current medications. Patient verbalized understanding of appointment instructions.        Patient mentioned has appointment with  from [facility] on 5/9/2024      Electronically signed by Sonja Child RN on 4/30/24 at 8:34 AM EDT

## 2024-05-06 ENCOUNTER — OFFICE VISIT (OUTPATIENT)
Dept: SURGERY | Age: 64
End: 2024-05-06
Payer: COMMERCIAL

## 2024-05-06 VITALS
HEIGHT: 59 IN | SYSTOLIC BLOOD PRESSURE: 124 MMHG | DIASTOLIC BLOOD PRESSURE: 68 MMHG | BODY MASS INDEX: 34.47 KG/M2 | WEIGHT: 171 LBS | HEART RATE: 75 BPM | OXYGEN SATURATION: 97 %

## 2024-05-06 DIAGNOSIS — K82.3 CHOLECYSTODUODENAL FISTULA: ICD-10-CM

## 2024-05-06 DIAGNOSIS — K56.3: ICD-10-CM

## 2024-05-06 DIAGNOSIS — K56.3 GALLSTONE ILEUS (HCC): Primary | ICD-10-CM

## 2024-05-06 DIAGNOSIS — C50.412 MALIGNANT NEOPLASM OF UPPER-OUTER QUADRANT OF LEFT FEMALE BREAST, UNSPECIFIED ESTROGEN RECEPTOR STATUS (HCC): ICD-10-CM

## 2024-05-06 PROCEDURE — 3017F COLORECTAL CA SCREEN DOC REV: CPT | Performed by: STUDENT IN AN ORGANIZED HEALTH CARE EDUCATION/TRAINING PROGRAM

## 2024-05-06 PROCEDURE — G8417 CALC BMI ABV UP PARAM F/U: HCPCS | Performed by: STUDENT IN AN ORGANIZED HEALTH CARE EDUCATION/TRAINING PROGRAM

## 2024-05-06 PROCEDURE — 1111F DSCHRG MED/CURRENT MED MERGE: CPT | Performed by: STUDENT IN AN ORGANIZED HEALTH CARE EDUCATION/TRAINING PROGRAM

## 2024-05-06 PROCEDURE — G8427 DOCREV CUR MEDS BY ELIG CLIN: HCPCS | Performed by: STUDENT IN AN ORGANIZED HEALTH CARE EDUCATION/TRAINING PROGRAM

## 2024-05-06 PROCEDURE — 1036F TOBACCO NON-USER: CPT | Performed by: STUDENT IN AN ORGANIZED HEALTH CARE EDUCATION/TRAINING PROGRAM

## 2024-05-06 PROCEDURE — G9899 SCRN MAM PERF RSLTS DOC: HCPCS | Performed by: STUDENT IN AN ORGANIZED HEALTH CARE EDUCATION/TRAINING PROGRAM

## 2024-05-06 PROCEDURE — 99205 OFFICE O/P NEW HI 60 MIN: CPT | Performed by: STUDENT IN AN ORGANIZED HEALTH CARE EDUCATION/TRAINING PROGRAM

## 2024-05-06 ASSESSMENT — ENCOUNTER SYMPTOMS
EYES NEGATIVE: 1
RESPIRATORY NEGATIVE: 1
ALLERGIC/IMMUNOLOGIC NEGATIVE: 1
GASTROINTESTINAL NEGATIVE: 1

## 2024-05-06 NOTE — PROGRESS NOTES
on  recent CT examination.  2. No biliary dilatation.  3. Borderline splenomegaly       Assessment & Plan   Assessment/Plan:  63-year-old female with gallstone ileus status post laparoscopic small bowel resection for small bowel obstruction with general surgery presenting with persistent cholecystoduodenal fistula, recent diagnosis of 5 cm left upper outer quadrant breast cancer  -I discussed with the patient that generally in this case with a gallstone ileus moves to her healthy enough treatment is a two-stage procedure 1 in which control of the small bowel obstruction is done first to remove the stone which Dr. Arce has already completed.  Next she would need control of the fistula to prevent further stones and further bowel obstructions.  We discussed that this operation would include a cholecystectomy plus control of the duodenum with likely primary repair, pyloric exclusion and Thai-en-Y gastrojejunostomy bypass.  We discussed the risks and benefits associated with this procedure and that I would attempt to perform it robotically with a high risk for conversion to open.  Risks include bleeding, infection, damage to the biliary tree or surrounding structures, risks of anesthesia.  We discussed that this would be about 5 to 7-day stay in the hospital postoperative.  She states understanding of these risks.  -We also discussed the fact that I would normally schedule her for surgery about 4 to 6 weeks from her original operation.  However, she does have this new diagnosis of a 5 cm left breast cancer which takes precedent.  She does not have any large stones within the gallbladder that I can see at this time.  We discussed that the gallbladder issue was a benign disease and this can wait until after her breast cancer is dealt with.  She is seeing Dr. Preston tomorrow to discuss next steps for her breast cancer.  If she will need to be on any sort of chemotherapy or have radiation we would need to wait a

## 2024-05-07 ENCOUNTER — OFFICE VISIT (OUTPATIENT)
Dept: BREAST CENTER | Age: 64
End: 2024-05-07
Payer: COMMERCIAL

## 2024-05-07 ENCOUNTER — TELEPHONE (OUTPATIENT)
Dept: CASE MANAGEMENT | Age: 64
End: 2024-05-07

## 2024-05-07 VITALS
WEIGHT: 171 LBS | HEIGHT: 59 IN | DIASTOLIC BLOOD PRESSURE: 78 MMHG | OXYGEN SATURATION: 98 % | RESPIRATION RATE: 20 BRPM | BODY MASS INDEX: 34.47 KG/M2 | TEMPERATURE: 98 F | HEART RATE: 81 BPM | SYSTOLIC BLOOD PRESSURE: 126 MMHG

## 2024-05-07 DIAGNOSIS — Z17.1 MALIGNANT NEOPLASM OF OVERLAPPING SITES OF LEFT BREAST IN FEMALE, ESTROGEN RECEPTOR NEGATIVE (HCC): Primary | ICD-10-CM

## 2024-05-07 DIAGNOSIS — C50.812 MALIGNANT NEOPLASM OF OVERLAPPING SITES OF LEFT BREAST IN FEMALE, ESTROGEN RECEPTOR NEGATIVE (HCC): Primary | ICD-10-CM

## 2024-05-07 DIAGNOSIS — Z17.1 MALIGNANT NEOPLASM OF OVERLAPPING SITES OF LEFT BREAST IN FEMALE, ESTROGEN RECEPTOR NEGATIVE (HCC): ICD-10-CM

## 2024-05-07 DIAGNOSIS — C50.812 MALIGNANT NEOPLASM OF OVERLAPPING SITES OF LEFT BREAST IN FEMALE, ESTROGEN RECEPTOR NEGATIVE (HCC): ICD-10-CM

## 2024-05-07 LAB
ALBUMIN: 3.8 G/DL (ref 3.5–5.2)
ALP BLD-CCNC: 128 U/L (ref 35–104)
ALT SERPL-CCNC: 11 U/L (ref 0–32)
ANION GAP SERPL CALCULATED.3IONS-SCNC: 13 MMOL/L (ref 7–16)
AST SERPL-CCNC: 17 U/L (ref 0–31)
BASOPHILS ABSOLUTE: 0.04 K/UL (ref 0–0.2)
BASOPHILS RELATIVE PERCENT: 1 % (ref 0–2)
BILIRUB SERPL-MCNC: 0.3 MG/DL (ref 0–1.2)
BUN BLDV-MCNC: 16 MG/DL (ref 6–23)
CALCIUM SERPL-MCNC: 9.3 MG/DL (ref 8.6–10.2)
CHLORIDE BLD-SCNC: 104 MMOL/L (ref 98–107)
CO2: 24 MMOL/L (ref 22–29)
CREAT SERPL-MCNC: 0.8 MG/DL (ref 0.5–1)
EOSINOPHILS ABSOLUTE: 0.29 K/UL (ref 0.05–0.5)
EOSINOPHILS RELATIVE PERCENT: 6 % (ref 0–6)
GFR, ESTIMATED: 81 ML/MIN/1.73M2
GLUCOSE BLD-MCNC: 157 MG/DL (ref 74–99)
HCT VFR BLD CALC: 26.5 % (ref 34–48)
HEMOGLOBIN: 7.6 G/DL (ref 11.5–15.5)
LYMPHOCYTES ABSOLUTE: 0.78 K/UL (ref 1.5–4)
LYMPHOCYTES RELATIVE PERCENT: 17 % (ref 20–42)
MCH RBC QN AUTO: 23.2 PG (ref 26–35)
MCHC RBC AUTO-ENTMCNC: 28.7 G/DL (ref 32–34.5)
MCV RBC AUTO: 81 FL (ref 80–99.9)
MONOCYTES ABSOLUTE: 0 K/UL (ref 0.1–0.95)
MONOCYTES RELATIVE PERCENT: 0 % (ref 2–12)
NEUTROPHILS ABSOLUTE: 3.6 K/UL (ref 1.8–7.3)
NEUTROPHILS RELATIVE PERCENT: 77 % (ref 43–80)
PDW BLD-RTO: 16.4 % (ref 11.5–15)
PLATELET # BLD: 315 K/UL (ref 130–450)
PMV BLD AUTO: 11 FL (ref 7–12)
POTASSIUM SERPL-SCNC: 4.3 MMOL/L (ref 3.5–5)
RBC # BLD: 3.27 M/UL (ref 3.5–5.5)
RBC # BLD: ABNORMAL 10*6/UL
SODIUM BLD-SCNC: 141 MMOL/L (ref 132–146)
TOTAL PROTEIN: 6.8 G/DL (ref 6.4–8.3)
WBC # BLD: 4.7 K/UL (ref 4.5–11.5)

## 2024-05-07 PROCEDURE — 36415 COLL VENOUS BLD VENIPUNCTURE: CPT | Performed by: SURGERY

## 2024-05-07 PROCEDURE — 99203 OFFICE O/P NEW LOW 30 MIN: CPT | Performed by: SURGERY

## 2024-05-07 PROCEDURE — G8427 DOCREV CUR MEDS BY ELIG CLIN: HCPCS | Performed by: SURGERY

## 2024-05-07 PROCEDURE — G8417 CALC BMI ABV UP PARAM F/U: HCPCS | Performed by: SURGERY

## 2024-05-07 PROCEDURE — 99204 OFFICE O/P NEW MOD 45 MIN: CPT | Performed by: SURGERY

## 2024-05-07 PROCEDURE — G9899 SCRN MAM PERF RSLTS DOC: HCPCS | Performed by: SURGERY

## 2024-05-07 PROCEDURE — 1036F TOBACCO NON-USER: CPT | Performed by: SURGERY

## 2024-05-07 PROCEDURE — 1111F DSCHRG MED/CURRENT MED MERGE: CPT | Performed by: SURGERY

## 2024-05-07 PROCEDURE — 3017F COLORECTAL CA SCREEN DOC REV: CPT | Performed by: SURGERY

## 2024-05-07 NOTE — PATIENT INSTRUCTIONS
Schedulers will call to schedule left axilla biopsy will be done at [facility].  Office will call with Bone scan and CT chest appointments.  Referral placed to  for medi port placement.   When see Oncology on 05/9/2024 let them know  is your breast surgeon so relays office notes to our office.  Contact office to schedule an appointment when you are midway through chemo.   Any questions or concerns, please contact the office at [phone].

## 2024-05-07 NOTE — PROGRESS NOTES
Date of Visit: 5/7/2024  New Patient Invasive Breast Cancer    05/07/24    DIAGNOSIS:  1. (05/07/24) LEFT (UOQ, palp) invasive ductal carcinoma  Clinical stage: T2N0M0  ER (- ) LA (-) HER2 (3+)  2. Recent lap for SBO    IMAGING/PROCEDURES:  1. (04/02/24) BILATERAL d-mammogram and US ([facility]): BIRADS-5  * LEFT (2:00-3:00, palp) 4.3cm mass  * LEFT mammographic skin thickening and nipple retraction  2. (04/16/24) LEFT US core biopsy ([facility])      HISTORY OF PRESENT ILLNESS  Zuri Boggs was in the office today for consultation regarding a diagnosis of a HER2 positive left breast cancer.    Zuri noted changes in her breast in December.  Specifically she had a palpable mass.  She noticed over time some skin thickening and nipple retraction.  The patient was referred for breast imaging where a 4.3 cm mass was noted in the left breast.  She eventually underwent ultrasound-guided core biopsy showing invasive ductal carcinoma that is ER negative LA negative and HER2 positive on immunohistochemistry.    BREAST SYMPTOMS  Zuri reports no change in the mass but does report progressive nipple retraction.  She has had no nipple discharge.  She has had no skin discoloration to report.  She has no symptomatology on the right side.    PAST BREAST HISTORY  Zuri reports no prior breast biopsies nor has she had any breast surgeries.      PAST MEDICAL/SURGICAL HISTORY  Past Medical History:   Diagnosis Date    Breast cancer (HCC)     Found out May 2024, Dr. Mohan Stratton      Past Surgical History:   Procedure Laterality Date    BREAST BIOPSY      COLECTOMY N/A 04/24/2024    EXPLORATORY LAPAROSCOPY SMALL  BOWEL RESECTION performed by Howie Arce MD at [facility] OR    COLONOSCOPY      12/2023 Dr. Orta    SMALL INTESTINE SURGERY         MEDICATIONS    Current Outpatient Medications:     ibuprofen (ADVIL;MOTRIN) 800 MG tablet, Take 1 tablet by mouth every 6 hours as needed for Pain, Disp: 20 tablet, Rfl: 0    Cyanocobalamin

## 2024-05-07 NOTE — TELEPHONE ENCOUNTER
Met with patient regarding her recent breast cancer diagnosis at surgical consultation appointment with . Reviewed pathology report.Instructed patient on her  breast biopsy pathology findings including cancer type (IDC) and hormone receptor status (ER-, TN-, Her2+). Instructed on next steps including breast surgery options per 's recommendations and any additional imaging that may be required. Provided with extensive literature including \"Be A Survivor: Your guide to Breast Cancer Treatment\", chapter 4 reviewed, Your Guide to Your Breast Cancer Pathology Report, NCCN Patient Resource card,American College of Surgeons Exercises after Breast Surgery, written information from [employer] Lymphedema:The Basics and American Cancer Society Clinical Trials.Today patient received copy of their pathology report as well as a list of [facility] oncology providers, information on diagnosis, transportation resources and local/online support group resources. I also provided patient with additional written information on Her 2 + Breast Cancer (Patient Resource Booklet). Patient confirms she has appointment Thursday with [facility]. Patient verbalizes understanding and appreciative of nurse navigator visit. JOCE LoftonN,RN-OCN

## 2024-05-08 ENCOUNTER — TELEPHONE (OUTPATIENT)
Dept: BREAST CENTER | Age: 64
End: 2024-05-08

## 2024-05-08 ENCOUNTER — CLINICAL DOCUMENTATION (OUTPATIENT)
Dept: GENERAL RADIOLOGY | Age: 64
End: 2024-05-08

## 2024-05-08 ENCOUNTER — TELEPHONE (OUTPATIENT)
Dept: SURGERY | Age: 64
End: 2024-05-08

## 2024-05-08 DIAGNOSIS — I87.8 POOR VENOUS ACCESS: ICD-10-CM

## 2024-05-08 PROBLEM — C50.919 BREAST CANCER (HCC): Status: ACTIVE | Noted: 2024-05-08

## 2024-05-08 NOTE — TELEPHONE ENCOUNTER
Pathology slides have been requested from [facility] -- Release of records has been faxed

## 2024-05-08 NOTE — TELEPHONE ENCOUNTER
Authorization for breast MRI, CT chest and NM Bone scan are Pending review with [employer] insurance.  Patient information has been faxed to  [phone] for clinical review.  May take 3-5 business days for approval.

## 2024-05-08 NOTE — TELEPHONE ENCOUNTER
Per the order of Dr. Arce, patient has been scheduled for Mediport insertion on 2024.  Patient provided with procedure information over the phone and informed that written information will be mailed to her.  Patient instructed to please contact our office with any questions.    Procedure scheduled through [facility].  Dr. Arce to enter orders.        Prior Authorization Form:      DEMOGRAPHICS:                     Patient Name:  Zuri Silva  Patient :  1960            Insurance:  Payor: [employer] / Plan: [employer] / Product Type: *No Product type* /   Insurance ID Number:    Payer/Plan Subscr  Sex Relation Sub. Ins. ID Effective Group Num   1. [employer]* ZURI SILVA 1960 Female Self [id number]                                   [address] [address]         DIAGNOSIS & PROCEDURE:                       Procedure/Operation: Mediport insertion           CPT Code: 19404    Diagnosis:  Breast cancer, poor venous access    ICD10 Code: I87.8, C50.812    Location:  [facility]    Surgeon:  Claude    SCHEDULING INFORMATION:                          Date: 2024    Time: TBD              Anesthesia:  MAC/TIVA                                                       Status:  Outpatient        Special Comments:         Electronically signed by Yany Mason on 2024 at 10:12 AM

## 2024-05-08 NOTE — PROGRESS NOTES
[facility]  Cleopatra called Zuri to offer to move her left axillary biopsy appointment to tomorrow.  Zuri declined. States she prefers to keep the previously scheduled appointment on 5/16/24. Electronically signed by Inge Trejo RN, BSN on 5/8/2024 at 2:18 PM

## 2024-05-09 ENCOUNTER — PREP FOR PROCEDURE (OUTPATIENT)
Dept: SURGERY | Age: 64
End: 2024-05-09

## 2024-05-09 ENCOUNTER — TELEPHONE (OUTPATIENT)
Dept: BREAST CENTER | Age: 64
End: 2024-05-09

## 2024-05-09 RX ORDER — SODIUM CHLORIDE, SODIUM LACTATE, POTASSIUM CHLORIDE, CALCIUM CHLORIDE 600; 310; 30; 20 MG/100ML; MG/100ML; MG/100ML; MG/100ML
INJECTION, SOLUTION INTRAVENOUS CONTINUOUS
Status: CANCELLED | OUTPATIENT
Start: 2024-05-09

## 2024-05-09 RX ORDER — SODIUM CHLORIDE 0.9 % (FLUSH) 0.9 %
5-40 SYRINGE (ML) INJECTION PRN
Status: CANCELLED | OUTPATIENT
Start: 2024-05-09

## 2024-05-09 RX ORDER — SODIUM CHLORIDE 0.9 % (FLUSH) 0.9 %
5-40 SYRINGE (ML) INJECTION EVERY 12 HOURS SCHEDULED
Status: CANCELLED | OUTPATIENT
Start: 2024-05-09

## 2024-05-09 RX ORDER — SODIUM CHLORIDE 9 MG/ML
INJECTION, SOLUTION INTRAVENOUS PRN
Status: CANCELLED | OUTPATIENT
Start: 2024-05-09

## 2024-05-09 NOTE — TELEPHONE ENCOUNTER
No prior authorization required for NM Bone scans or CT Chest scan -- per [employer].    Breast MRI is still Pending review

## 2024-05-09 NOTE — TELEPHONE ENCOUNTER
RN received call from [facility] in regards to patient.  patients oncologist is requesting to speak with  in regards to patient treatment. RN instructed patient that he was currently in the OR but could call him. Office stated wasn't urgent and to just have  contact him when he's free on his cell phone. RN gave Dr.Keenan Ma number directly.         Electronically signed by Sonja Child RN on 5/9/24 at 2:11 PM EDT

## 2024-05-15 ENCOUNTER — TELEPHONE (OUTPATIENT)
Dept: BREAST CENTER | Age: 64
End: 2024-05-15

## 2024-05-15 NOTE — TELEPHONE ENCOUNTER
Patient is scheduled for NM Bone scan and CT chest  5/20/24 @ [facility] - Arrival 9:30am - patient was instructed NPO after midnight and hold her Metformin for 48 hours after the tests.  Patient has been notifed

## 2024-05-15 NOTE — PROGRESS NOTES
[facility]                                                                                                                    PRE OP INSTRUCTIONS FOR  Zuri Boggs        Date: 5/15/2024    Date of surgery: 5/17/24   Arrival Time: Hospital will call you between 5pm and 7pm with your final arrival time for surgery. Go to front of hospital and check in at information desk.    Nothing by mouth (NPO) as instructed. May have clear liquids up to 2 hours prior to surgery. Nothing solid after midnight. Examples: water, apple juice, black coffee, plain tea    Take the following medications with a small sip of water on the morning of Surgery: none     Diabetics may take half the evening dose of insulin but none after midnight.  If you feel symptomatic or have low blood sugar morning of surgery drink 1-2 ounces of apple juice only. If you take a weekly insulin injection _______________, stop 7 days prior to surgery. If you take _______________, stop 3-4 days prior to surgery.    Aspirin, Ibuprofen, Advil, Naproxen, other Anti-inflammatory products should be stopped before surgery as directed by your surgeon, cardiologist, or primary care Doctor. Herbal supplements and Vitamin E should be stopped five days prior.  May take Tylenol unless instructed otherwise by your surgeon.    Check with your Doctor regarding stopping Plavix, Coumadin, Lovenox, Eliquis, Effient, or other blood thinners such as, pradaxa, lixiana, xaralto and savaysa.    Do not smoke, vape, or use illicit drugs and do not drink any alcoholic beverages 24 hours prior to surgery.    You may brush your teeth the morning of surgery.      You MUST make arrangements for a responsible adult, 18 and over, to take you home after your surgery. You will not be allowed to leave alone or drive yourself home.  You will need someone stay with you the first 24 hrs. Your surgery will be cancelled if you do not have a ride home or someone to stay with

## 2024-05-16 ENCOUNTER — ANESTHESIA EVENT (OUTPATIENT)
Dept: OPERATING ROOM | Age: 64
End: 2024-05-16
Payer: COMMERCIAL

## 2024-05-16 ENCOUNTER — HOSPITAL ENCOUNTER (OUTPATIENT)
Dept: GENERAL RADIOLOGY | Age: 64
Discharge: HOME | End: 2024-05-18
Attending: SURGERY
Payer: COMMERCIAL

## 2024-05-16 ENCOUNTER — HOSPITAL ENCOUNTER (OUTPATIENT)
Dept: MRI IMAGING | Age: 64
Discharge: HOME | End: 2024-05-18
Attending: SURGERY
Payer: COMMERCIAL

## 2024-05-16 ENCOUNTER — TRANSCRIBE ORDERS (OUTPATIENT)
Age: 64
End: 2024-05-16

## 2024-05-16 DIAGNOSIS — C50.812 MALIGNANT NEOPLASM OF OVERLAPPING SITES OF LEFT BREAST IN FEMALE, ESTROGEN RECEPTOR NEGATIVE (HCC): ICD-10-CM

## 2024-05-16 DIAGNOSIS — Z01.818 PREOP EXAMINATION: Primary | ICD-10-CM

## 2024-05-16 DIAGNOSIS — Z17.1 MALIGNANT NEOPLASM OF OVERLAPPING SITES OF LEFT BREAST IN FEMALE, ESTROGEN RECEPTOR NEGATIVE (HCC): ICD-10-CM

## 2024-05-16 PROCEDURE — 76942 ECHO GUIDE FOR BIOPSY: CPT

## 2024-05-16 PROCEDURE — C8908 MRI W/O FOL W/CONT, BREAST,: HCPCS

## 2024-05-16 PROCEDURE — 6360000004 HC RX CONTRAST MEDICATION: Performed by: RADIOLOGY

## 2024-05-16 PROCEDURE — 10035 PLMT SFT TISS LOCLZJ DEV 1ST: CPT

## 2024-05-16 PROCEDURE — A9585 GADOBUTROL INJECTION: HCPCS | Performed by: RADIOLOGY

## 2024-05-16 PROCEDURE — 76882 US LMTD JT/FCL EVL NVASC XTR: CPT

## 2024-05-16 RX ORDER — GADOBUTROL 604.72 MG/ML
7 INJECTION INTRAVENOUS
Status: COMPLETED | OUTPATIENT
Start: 2024-05-16 | End: 2024-05-16

## 2024-05-16 RX ADMIN — GADOBUTROL 7 ML: 604.72 INJECTION INTRAVENOUS at 11:19

## 2024-05-16 NOTE — PROGRESS NOTES
Met with patient and her son Kenroy prior to her axillary biopsy. Instructed on ultrasound guided left axillary core needle biopsy with clip placement procedure. Instructed that results will be available in approximately 3-5 business days. Bonifacio Jasmine will sign patient up for [facility]. Patient is agreeable to discussion of axillary biopsy results by phone per ordering physician. Provided with folder containing contact information and post biopsy discharge instructions. Instructed to call if she has any questions or concerns about her biopsy. Verbalizes understanding.     Patient is scheduled for a mediport insertion on 05/17/24, CT chest and bone scan on 05/20/24, breast MRI is pending appointment, and will start NACT per Dr. Khan on 05/23/24.

## 2024-05-17 ENCOUNTER — HOSPITAL ENCOUNTER (OUTPATIENT)
Age: 64
Setting detail: OUTPATIENT SURGERY
Discharge: HOME | End: 2024-05-17
Attending: SURGERY | Admitting: SURGERY
Payer: COMMERCIAL

## 2024-05-17 ENCOUNTER — ANESTHESIA (OUTPATIENT)
Dept: OPERATING ROOM | Age: 64
End: 2024-05-17
Payer: COMMERCIAL

## 2024-05-17 ENCOUNTER — APPOINTMENT (OUTPATIENT)
Dept: GENERAL RADIOLOGY | Age: 64
End: 2024-05-17
Attending: SURGERY
Payer: COMMERCIAL

## 2024-05-17 VITALS
HEIGHT: 59 IN | RESPIRATION RATE: 20 BRPM | WEIGHT: 165 LBS | OXYGEN SATURATION: 96 % | TEMPERATURE: 98 F | SYSTOLIC BLOOD PRESSURE: 172 MMHG | DIASTOLIC BLOOD PRESSURE: 76 MMHG | HEART RATE: 93 BPM | BODY MASS INDEX: 33.26 KG/M2

## 2024-05-17 DIAGNOSIS — C50.919 MALIGNANT NEOPLASM OF FEMALE BREAST, UNSPECIFIED ESTROGEN RECEPTOR STATUS, UNSPECIFIED LATERALITY, UNSPECIFIED SITE OF BREAST (HCC): Primary | ICD-10-CM

## 2024-05-17 DIAGNOSIS — I87.8 POOR VENOUS ACCESS: ICD-10-CM

## 2024-05-17 DIAGNOSIS — C50.919 BREAST CANCER (HCC): ICD-10-CM

## 2024-05-17 LAB
BASOPHILS # BLD: 0.03 K/UL (ref 0–0.2)
BASOPHILS NFR BLD: 1 % (ref 0–2)
EOSINOPHIL # BLD: 0.16 K/UL (ref 0.05–0.5)
EOSINOPHILS RELATIVE PERCENT: 3 % (ref 0–6)
ERYTHROCYTE [DISTWIDTH] IN BLOOD BY AUTOMATED COUNT: 15.9 % (ref 11.5–15)
GLUCOSE BLD-MCNC: 144 MG/DL (ref 74–99)
HCT VFR BLD AUTO: 27.6 % (ref 34–48)
HGB BLD-MCNC: 8.4 G/DL (ref 11.5–15.5)
IMM GRANULOCYTES # BLD AUTO: 0.06 K/UL (ref 0–0.58)
IMM GRANULOCYTES NFR BLD: 1 % (ref 0–5)
INR PPP: 1
LYMPHOCYTES NFR BLD: 0.9 K/UL (ref 1.5–4)
LYMPHOCYTES RELATIVE PERCENT: 19 % (ref 20–42)
MCH RBC QN AUTO: 23.8 PG (ref 26–35)
MCHC RBC AUTO-ENTMCNC: 30.4 G/DL (ref 32–34.5)
MCV RBC AUTO: 78.2 FL (ref 80–99.9)
MONOCYTES NFR BLD: 0.36 K/UL (ref 0.1–0.95)
MONOCYTES NFR BLD: 8 % (ref 2–12)
NEUTROPHILS NFR BLD: 68 % (ref 43–80)
NEUTS SEG NFR BLD: 3.16 K/UL (ref 1.8–7.3)
PARTIAL THROMBOPLASTIN TIME: 32.9 SEC (ref 24.5–35.1)
PLATELET # BLD AUTO: 306 K/UL (ref 130–450)
PMV BLD AUTO: 9.8 FL (ref 7–12)
PROTHROMBIN TIME: 11.2 SEC (ref 9.3–12.4)
RBC # BLD AUTO: 3.53 M/UL (ref 3.5–5.5)
WBC OTHER # BLD: 4.7 K/UL (ref 4.5–11.5)

## 2024-05-17 PROCEDURE — 7100000010 HC PHASE II RECOVERY - FIRST 15 MIN: Performed by: SURGERY

## 2024-05-17 PROCEDURE — 36561 INSERT TUNNELED CV CATH: CPT | Performed by: SURGERY

## 2024-05-17 PROCEDURE — 82962 GLUCOSE BLOOD TEST: CPT

## 2024-05-17 PROCEDURE — 85730 THROMBOPLASTIN TIME PARTIAL: CPT

## 2024-05-17 PROCEDURE — 3700000001 HC ADD 15 MINUTES (ANESTHESIA): Performed by: SURGERY

## 2024-05-17 PROCEDURE — 6360000002 HC RX W HCPCS

## 2024-05-17 PROCEDURE — 71045 X-RAY EXAM CHEST 1 VIEW: CPT

## 2024-05-17 PROCEDURE — C1788 PORT, INDWELLING, IMP: HCPCS | Performed by: SURGERY

## 2024-05-17 PROCEDURE — C1881 DIALYSIS ACCESS SYSTEM: HCPCS | Performed by: SURGERY

## 2024-05-17 PROCEDURE — 85610 PROTHROMBIN TIME: CPT

## 2024-05-17 PROCEDURE — 2500000003 HC RX 250 WO HCPCS: Performed by: SURGERY

## 2024-05-17 PROCEDURE — 77001 FLUOROGUIDE FOR VEIN DEVICE: CPT | Performed by: SURGERY

## 2024-05-17 PROCEDURE — 6370000000 HC RX 637 (ALT 250 FOR IP): Performed by: SURGERY

## 2024-05-17 PROCEDURE — 99024 POSTOP FOLLOW-UP VISIT: CPT | Performed by: SURGERY

## 2024-05-17 PROCEDURE — 2709999900 HC NON-CHARGEABLE SUPPLY: Performed by: SURGERY

## 2024-05-17 PROCEDURE — 2580000003 HC RX 258: Performed by: SURGERY

## 2024-05-17 PROCEDURE — 7100000011 HC PHASE II RECOVERY - ADDTL 15 MIN: Performed by: SURGERY

## 2024-05-17 PROCEDURE — 3600000013 HC SURGERY LEVEL 3 ADDTL 15MIN: Performed by: SURGERY

## 2024-05-17 PROCEDURE — 3700000000 HC ANESTHESIA ATTENDED CARE: Performed by: SURGERY

## 2024-05-17 PROCEDURE — A4216 STERILE WATER/SALINE, 10 ML: HCPCS | Performed by: SURGERY

## 2024-05-17 PROCEDURE — 85025 COMPLETE CBC W/AUTO DIFF WBC: CPT

## 2024-05-17 PROCEDURE — 6360000002 HC RX W HCPCS: Performed by: SURGERY

## 2024-05-17 PROCEDURE — 3600000003 HC SURGERY LEVEL 3 BASE: Performed by: SURGERY

## 2024-05-17 PROCEDURE — 2580000003 HC RX 258

## 2024-05-17 DEVICE — SMART PORT CT SINGLE TITANIUM PORT SYSTEM WITH ATTACHABLE 8.0F X 66CM POLYURETHANE CATHETER AND 8 F INTRODUCER KIT
Type: IMPLANTABLE DEVICE | Status: FUNCTIONAL
Brand: SMART PORT CT

## 2024-05-17 RX ORDER — FENTANYL CITRATE 50 UG/ML
INJECTION, SOLUTION INTRAMUSCULAR; INTRAVENOUS PRN
Status: DISCONTINUED | OUTPATIENT
Start: 2024-05-17 | End: 2024-05-17 | Stop reason: SDUPTHER

## 2024-05-17 RX ORDER — DIPHENHYDRAMINE HYDROCHLORIDE 50 MG/ML
12.5 INJECTION INTRAMUSCULAR; INTRAVENOUS
Status: DISCONTINUED | OUTPATIENT
Start: 2024-05-17 | End: 2024-05-17 | Stop reason: HOSPADM

## 2024-05-17 RX ORDER — SODIUM CHLORIDE 0.9 % (FLUSH) 0.9 %
5-40 SYRINGE (ML) INJECTION EVERY 12 HOURS SCHEDULED
Status: DISCONTINUED | OUTPATIENT
Start: 2024-05-17 | End: 2024-05-17 | Stop reason: HOSPADM

## 2024-05-17 RX ORDER — SODIUM CHLORIDE 9 MG/ML
INJECTION, SOLUTION INTRAVENOUS PRN
Status: DISCONTINUED | OUTPATIENT
Start: 2024-05-17 | End: 2024-05-17 | Stop reason: HOSPADM

## 2024-05-17 RX ORDER — SODIUM CHLORIDE 9 MG/ML
INJECTION, SOLUTION INTRAMUSCULAR; INTRAVENOUS; SUBCUTANEOUS PRN
Status: DISCONTINUED | OUTPATIENT
Start: 2024-05-17 | End: 2024-05-17 | Stop reason: ALTCHOICE

## 2024-05-17 RX ORDER — SODIUM CHLORIDE 0.9 % (FLUSH) 0.9 %
5-40 SYRINGE (ML) INJECTION PRN
Status: DISCONTINUED | OUTPATIENT
Start: 2024-05-17 | End: 2024-05-17 | Stop reason: HOSPADM

## 2024-05-17 RX ORDER — SODIUM CHLORIDE, SODIUM LACTATE, POTASSIUM CHLORIDE, CALCIUM CHLORIDE 600; 310; 30; 20 MG/100ML; MG/100ML; MG/100ML; MG/100ML
INJECTION, SOLUTION INTRAVENOUS CONTINUOUS
Status: DISCONTINUED | OUTPATIENT
Start: 2024-05-17 | End: 2024-05-17 | Stop reason: HOSPADM

## 2024-05-17 RX ORDER — MIDAZOLAM HYDROCHLORIDE 1 MG/ML
INJECTION INTRAMUSCULAR; INTRAVENOUS PRN
Status: DISCONTINUED | OUTPATIENT
Start: 2024-05-17 | End: 2024-05-17 | Stop reason: SDUPTHER

## 2024-05-17 RX ORDER — ONDANSETRON 2 MG/ML
4 INJECTION INTRAMUSCULAR; INTRAVENOUS
Status: DISCONTINUED | OUTPATIENT
Start: 2024-05-17 | End: 2024-05-17 | Stop reason: HOSPADM

## 2024-05-17 RX ORDER — BUPIVACAINE HYDROCHLORIDE AND EPINEPHRINE 2.5; 5 MG/ML; UG/ML
INJECTION, SOLUTION EPIDURAL; INFILTRATION; INTRACAUDAL; PERINEURAL PRN
Status: DISCONTINUED | OUTPATIENT
Start: 2024-05-17 | End: 2024-05-17 | Stop reason: ALTCHOICE

## 2024-05-17 RX ORDER — TRAMADOL HYDROCHLORIDE 50 MG/1
50 TABLET ORAL EVERY 6 HOURS PRN
Qty: 12 TABLET | Refills: 0 | Status: SHIPPED | OUTPATIENT
Start: 2024-05-17 | End: 2024-05-20

## 2024-05-17 RX ORDER — FENTANYL CITRATE 0.05 MG/ML
25 INJECTION, SOLUTION INTRAMUSCULAR; INTRAVENOUS EVERY 5 MIN PRN
Status: DISCONTINUED | OUTPATIENT
Start: 2024-05-17 | End: 2024-05-17 | Stop reason: HOSPADM

## 2024-05-17 RX ORDER — NALOXONE HYDROCHLORIDE 0.4 MG/ML
INJECTION, SOLUTION INTRAMUSCULAR; INTRAVENOUS; SUBCUTANEOUS PRN
Status: DISCONTINUED | OUTPATIENT
Start: 2024-05-17 | End: 2024-05-17 | Stop reason: HOSPADM

## 2024-05-17 RX ORDER — HEPARIN 100 UNIT/ML
SYRINGE INTRAVENOUS PRN
Status: DISCONTINUED | OUTPATIENT
Start: 2024-05-17 | End: 2024-05-17 | Stop reason: ALTCHOICE

## 2024-05-17 RX ORDER — PROPOFOL 10 MG/ML
INJECTION, EMULSION INTRAVENOUS CONTINUOUS PRN
Status: DISCONTINUED | OUTPATIENT
Start: 2024-05-17 | End: 2024-05-17 | Stop reason: SDUPTHER

## 2024-05-17 RX ORDER — SODIUM CHLORIDE, SODIUM LACTATE, POTASSIUM CHLORIDE, CALCIUM CHLORIDE 600; 310; 30; 20 MG/100ML; MG/100ML; MG/100ML; MG/100ML
INJECTION, SOLUTION INTRAVENOUS CONTINUOUS PRN
Status: DISCONTINUED | OUTPATIENT
Start: 2024-05-17 | End: 2024-05-17 | Stop reason: SDUPTHER

## 2024-05-17 RX ADMIN — SODIUM CHLORIDE, POTASSIUM CHLORIDE, SODIUM LACTATE AND CALCIUM CHLORIDE: 600; 310; 30; 20 INJECTION, SOLUTION INTRAVENOUS at 09:18

## 2024-05-17 RX ADMIN — FENTANYL CITRATE 25 MCG: 50 INJECTION, SOLUTION INTRAMUSCULAR; INTRAVENOUS at 09:39

## 2024-05-17 RX ADMIN — FENTANYL CITRATE 50 MCG: 50 INJECTION, SOLUTION INTRAMUSCULAR; INTRAVENOUS at 09:24

## 2024-05-17 RX ADMIN — FENTANYL CITRATE 25 MCG: 50 INJECTION, SOLUTION INTRAMUSCULAR; INTRAVENOUS at 09:35

## 2024-05-17 RX ADMIN — SODIUM CHLORIDE, POTASSIUM CHLORIDE, SODIUM LACTATE AND CALCIUM CHLORIDE: 600; 310; 30; 20 INJECTION, SOLUTION INTRAVENOUS at 07:49

## 2024-05-17 RX ADMIN — MIDAZOLAM 2 MG: 1 INJECTION INTRAMUSCULAR; INTRAVENOUS at 09:19

## 2024-05-17 RX ADMIN — CEFAZOLIN 2000 MG: 2 INJECTION, POWDER, FOR SOLUTION INTRAMUSCULAR; INTRAVENOUS at 09:22

## 2024-05-17 RX ADMIN — PROPOFOL INJECTABLE EMULSION 75 MCG/KG/MIN: 10 INJECTION, EMULSION INTRAVENOUS at 09:24

## 2024-05-17 ASSESSMENT — PAIN - FUNCTIONAL ASSESSMENT: PAIN_FUNCTIONAL_ASSESSMENT: NONE - DENIES PAIN

## 2024-05-17 NOTE — ANESTHESIA POSTPROCEDURE EVALUATION
Department of Anesthesiology  Postprocedure Note    Patient: Zuri Boggs  MRN: [id number]  YOB: 1960  Date of evaluation: 5/17/2024    Procedure Summary       Date: 05/17/24 Room / Location: [address]    Anesthesia Start: 0919 Anesthesia Stop: 0946    Procedure: MEDIPORT INSERTION (Left: Chest) Diagnosis:       Poor venous access      Breast cancer (HCC)      (Poor venous access [I87.8])      (Breast cancer (HCC) [C50.919])    Surgeons: Howie Arce MD Responsible Provider: Jag Enriquez MD    Anesthesia Type: MAC ASA Status: 3            Anesthesia Type: No value filed.    Mary Beth Phase I: Mary Beth Score: 10    Mary Beth Phase II: Mary Beth Score: 10    Anesthesia Post Evaluation    Patient location during evaluation: PACU  Patient participation: waiting for patient participation  Level of consciousness: awake  Pain score: 2  Airway patency: patent  Nausea & Vomiting: no nausea  Cardiovascular status: blood pressure returned to baseline  Respiratory status: acceptable  Hydration status: euvolemic  Pain management: adequate    No notable events documented.

## 2024-05-17 NOTE — H&P
General Surgery History and Physical    Patient's Name/Date of Birth: Zuri Boggs / 1960    Date: May 17, 2024     Surgeon: Howie Arce M.D.    PCP: Winter Sarkar MD     Chief Complaint: poor venous access with breast cancer     HPI:   Zuri Boggs is a 63 y.o. female who presents for evaluation of cancer and poor venous access for port placement.       Past Medical History:   Diagnosis Date    Breast cancer (HCC)     Found out May 2024, Dr. Preston    Diabetes mellitus (HCC)     Hyperlipidemia     Thyroid disease        Past Surgical History:   Procedure Laterality Date    BREAST BIOPSY      COLECTOMY N/A 04/24/2024    EXPLORATORY LAPAROSCOPY SMALL  BOWEL RESECTION performed by Howie Arce MD at [facility] OR    COLONOSCOPY      12/2023 Dr. Orta    ERCP      removed gallstone    SOHA NEEDLE BIOPSY LYMPH NODE SUPERFICIAL  5/16/2024    SOHA NEEDLE BIOPSY LYMPH NODE SUPERFICIAL 5/16/2024 [facility]       No current facility-administered medications for this encounter.       No Known Allergies    The patient has a family history that is negative for severe cardiovascular or respiratory issues, negative for reaction to anesthesia.    Social History     Socioeconomic History    Marital status:      Spouse name: Not on file    Number of children: Not on file    Years of education: Not on file    Highest education level: Not on file   Occupational History    Not on file   Tobacco Use    Smoking status: Never     Passive exposure: Never    Smokeless tobacco: Never   Vaping Use    Vaping Use: Never used   Substance and Sexual Activity    Alcohol use: Never    Drug use: Never    Sexual activity: Defer   Other Topics Concern    Not on file   Social History Narrative    Not on file     Social Determinants of Health     Financial Resource Strain: Not on file   Food Insecurity: Not on file   Transportation Needs: Not on file   Physical Activity: Not on file   Stress: Not on file   Social Connections:  patient's satisfaction and they freely signed the consent.    Howie Arce MD  6:53 AM  5/17/2024

## 2024-05-17 NOTE — DISCHARGE INSTRUCTIONS
Patient Discharge Instructions  Discharge Date:  5/17/2024    Discharged To:  Home    RESUME ACTIVITY:      BATHING:  May shower 24hrs after surgery, may bathe 3 days after surgery    DRIVING: No driving while on pain medications    RETURN TO WORK: after follow up appointment    WALKING:  Yes    SEXUAL ACTIVITY: Yes    STAIRS:  Yes    DIET: common adult    BOWELS: constipation is a side effect of your pain meds, take a daily laxative (miralax, dulcolax, etc.) as needed to keep your bowels moving as they normally do, do not go 2-3 days without having a bowel movement.    Pain medications;   Pain meds cause constipation so pay close attention to the \"bowels\" topic above.     SPECIAL INSTRUCTIONS:     Call the office at  if you have a fever > 100 F, or if your incision becomes red, tender, or drains more than a small amount of clear fluid.    Call  for follow up appointment with Dr. Arce in:  as needed

## 2024-05-17 NOTE — OP NOTE
Zuri Boggs  1960      DATE OF PROCEDURE: 5/17/2024     SURGEON: Howie Arce M.D.     ASSISTANT: caitlin     PREOPERATIVE DIAGNOSIS: poor venous access and breast ca.     POSTOPERATIVE DIAGNOSIS: Same    OPERATION: Insertion of left subclavian central venous catheter with subcutaneous reservoir, fluoroscopy     ANESTHESIA: LMAC     ESTIMATED BLOOD LOSS: Minimal     COMPLICATIONS: None    DESCRIPTION OF PROCEDURE: The patient was identified and the procedure was confirmed. The left neck and chest were prepped and draped in the usual sterile fashion. Next, local anesthesic was used for local anesthesia. The left subclavian vein was percutaneously entered and a guidewire was advanced into the superior vena cava under fluoroscopic guidance. A small incision was made around the wire. A second skin incision was made below the clavicle and a pocket was made in the subcutaneous tissue for the reservoir.  The catheter was pulled through a subcutaneous tunnel from the inferior chest incision to the access incision. The introducer was passed over the wire then the catheter was passed through the introducer and the tip was positioned in the superior vena cava right atrial junction under fluoroscopic guidance. The catheter was connected to the reservoir and the locking hub was engaged.  The port was noted to withdrawal and flush blood easily and was flushed with heparinized saline solution. The reservoir was secured in the pocket with vicryl sutures.  Hemostasis was assured and the incisions were irrigated.  The incisions were approximated with Vicryl sutures and Dermabond was applied to the incisions in the operating room.  Needle, sponge, and instrument counts were reported as correct times two.  The patient tolerated the procedure and was transferred back to the floor in satisfactory condition.       Howie Arce MD  9:43 AM  5/17/2024

## 2024-05-17 NOTE — PROGRESS NOTES
CLINICAL PHARMACY NOTE: MEDS TO BEDS    Total # of Prescriptions Filled: 1   The following medications were delivered to the patient:  Tramadol 50 mg    Additional Documentation:

## 2024-05-17 NOTE — ANESTHESIA PRE PROCEDURE
cancer (HCC) C50.919       Past Medical History:        Diagnosis Date    Breast cancer (HCC)     Found out May 2024, Dr. Preston    Diabetes mellitus (HCC)     Hyperlipidemia     Thyroid disease        Past Surgical History:        Procedure Laterality Date    BREAST BIOPSY      COLECTOMY N/A 04/24/2024    EXPLORATORY LAPAROSCOPY SMALL  BOWEL RESECTION performed by Howie Arce MD at [facility] OR    COLONOSCOPY      12/2023 Dr. Orta    ERCP      removed gallstone    SOHA NEEDLE BIOPSY LYMPH NODE SUPERFICIAL  5/16/2024    SOHA NEEDLE BIOPSY LYMPH NODE SUPERFICIAL 5/16/2024 [facility]       Social History:    Social History     Tobacco Use    Smoking status: Never     Passive exposure: Never    Smokeless tobacco: Never   Substance Use Topics    Alcohol use: Never                                Counseling given: Not Answered      Vital Signs (Current):   Vitals:    05/15/24 1110 05/17/24 0735   BP:  (!) 178/74   Pulse:  72   Resp:  16   Temp:  98 °F (36.7 °C)   TempSrc:  Infrared   SpO2:  96%   Weight: 74.8 kg (165 lb)    Height: 1.499 m (4' 11\")                                               BP Readings from Last 3 Encounters:   05/17/24 (!) 178/74   05/07/24 126/78   05/06/24 124/68       NPO Status: Time of last liquid consumption: 2000                        Time of last solid consumption: 2000                        Date of last liquid consumption: 05/16/24                        Date of last solid food consumption: 05/16/24    BMI:   Wt Readings from Last 3 Encounters:   05/15/24 74.8 kg (165 lb)   05/07/24 77.6 kg (171 lb)   05/06/24 77.6 kg (171 lb)     Body mass index is 33.33 kg/m².    CBC:   Lab Results   Component Value Date/Time    WBC 4.7 05/07/2024 09:27 AM    RBC 3.27 05/07/2024 09:27 AM    HGB 7.6 05/07/2024 09:27 AM    HCT 26.5 05/07/2024 09:27 AM    MCV 81.0 05/07/2024 09:27 AM    RDW 16.4 05/07/2024 09:27 AM     05/07/2024 09:27 AM       CMP:   Lab Results   Component Value Date/Time

## 2024-05-20 ENCOUNTER — HOSPITAL ENCOUNTER (OUTPATIENT)
Dept: CT IMAGING | Age: 64
Discharge: HOME | End: 2024-05-20
Payer: COMMERCIAL

## 2024-05-20 ENCOUNTER — HOSPITAL ENCOUNTER (OUTPATIENT)
Age: 64
Discharge: HOME | End: 2024-05-22
Payer: COMMERCIAL

## 2024-05-20 ENCOUNTER — HOSPITAL ENCOUNTER (OUTPATIENT)
Dept: NUCLEAR MEDICINE | Age: 64
Discharge: HOME | End: 2024-05-20
Payer: COMMERCIAL

## 2024-05-20 VITALS
SYSTOLIC BLOOD PRESSURE: 173 MMHG | HEIGHT: 59 IN | DIASTOLIC BLOOD PRESSURE: 76 MMHG | WEIGHT: 165 LBS | BODY MASS INDEX: 33.26 KG/M2

## 2024-05-20 DIAGNOSIS — Z01.818 PREOP EXAMINATION: ICD-10-CM

## 2024-05-20 DIAGNOSIS — Z17.1 MALIGNANT NEOPLASM OF OVERLAPPING SITES OF LEFT BREAST IN FEMALE, ESTROGEN RECEPTOR NEGATIVE (HCC): ICD-10-CM

## 2024-05-20 DIAGNOSIS — C50.812 MALIGNANT NEOPLASM OF OVERLAPPING SITES OF LEFT BREAST IN FEMALE, ESTROGEN RECEPTOR NEGATIVE (HCC): ICD-10-CM

## 2024-05-20 LAB
ECHO AR MAX VEL PISA: 2.9 M/S
ECHO AV AREA PEAK VELOCITY: 2.4 CM2
ECHO AV AREA PEAK VELOCITY: 2.5 CM2
ECHO AV AREA PEAK VELOCITY: 2.6 CM2
ECHO AV AREA PEAK VELOCITY: 2.7 CM2
ECHO AV AREA VTI: 2.5 CM2
ECHO AV AREA/BSA VTI: 1.5 CM2/M2
ECHO AV CUSP MM: 1.9 CM
ECHO AV MEAN GRADIENT: 4 MMHG
ECHO AV MEAN VELOCITY: 0.9 M/S
ECHO AV PEAK GRADIENT: 10 MMHG
ECHO AV PEAK GRADIENT: 8 MMHG
ECHO AV PEAK VELOCITY: 1.4 M/S
ECHO AV PEAK VELOCITY: 1.5 M/S
ECHO AV REGURGITANT PHT: 4661.7 MILLISECOND
ECHO AV VTI: 31.8 CM
ECHO BSA: 1.76 M2
ECHO EST RA PRESSURE: 3 MMHG
ECHO LA DIAMETER INDEX: 2.29 CM/M2
ECHO LA DIAMETER: 3.9 CM
ECHO LA VOL A-L A2C: 54 ML (ref 22–52)
ECHO LA VOL A-L A4C: 44 ML (ref 22–52)
ECHO LA VOL BP: 46 ML (ref 22–52)
ECHO LA VOL MOD A2C: 51 ML (ref 22–52)
ECHO LA VOL MOD A4C: 41 ML (ref 22–52)
ECHO LA VOL/BSA BIPLANE: 27 ML/M2 (ref 16–34)
ECHO LA VOLUME AREA LENGTH: 49 ML
ECHO LA VOLUME INDEX A-L A2C: 32 ML/M2 (ref 16–34)
ECHO LA VOLUME INDEX A-L A4C: 26 ML/M2 (ref 16–34)
ECHO LA VOLUME INDEX AREA LENGTH: 29 ML/M2 (ref 16–34)
ECHO LA VOLUME INDEX MOD A2C: 30 ML/M2 (ref 16–34)
ECHO LA VOLUME INDEX MOD A4C: 24 ML/M2 (ref 16–34)
ECHO LV FRACTIONAL SHORTENING: 37 % (ref 28–44)
ECHO LV INTERNAL DIMENSION DIASTOLE INDEX: 3.06 CM/M2
ECHO LV INTERNAL DIMENSION DIASTOLIC: 5.2 CM (ref 3.9–5.3)
ECHO LV INTERNAL DIMENSION SYSTOLIC INDEX: 1.94 CM/M2
ECHO LV INTERNAL DIMENSION SYSTOLIC: 3.3 CM
ECHO LV IVSD: 1.2 CM (ref 0.6–0.9)
ECHO LV IVSS: 1.7 CM
ECHO LV MASS 2D: 248.8 G (ref 67–162)
ECHO LV MASS INDEX 2D: 146.4 G/M2 (ref 43–95)
ECHO LV POSTERIOR WALL DIASTOLIC: 1.2 CM (ref 0.6–0.9)
ECHO LV POSTERIOR WALL SYSTOLIC: 1.8 CM
ECHO LV RELATIVE WALL THICKNESS RATIO: 0.46
ECHO LVOT AREA: 3.1 CM2
ECHO LVOT AV VTI INDEX: 0.79
ECHO LVOT DIAM: 2 CM
ECHO LVOT MEAN GRADIENT: 2 MMHG
ECHO LVOT PEAK GRADIENT: 5 MMHG
ECHO LVOT PEAK GRADIENT: 5 MMHG
ECHO LVOT PEAK VELOCITY: 1.1 M/S
ECHO LVOT PEAK VELOCITY: 1.2 M/S
ECHO LVOT STROKE VOLUME INDEX: 46.2 ML/M2
ECHO LVOT SV: 78.5 ML
ECHO LVOT VTI: 25 CM
ECHO MV "A" WAVE DURATION: 148.4 MSEC
ECHO MV A VELOCITY: 0.8 M/S
ECHO MV AREA PHT: 5 CM2
ECHO MV AREA VTI: 2.9 CM2
ECHO MV E DECELERATION TIME (DT): 157.3 MS
ECHO MV E VELOCITY: 0.72 M/S
ECHO MV E/A RATIO: 0.9
ECHO MV LVOT VTI INDEX: 1.1
ECHO MV MAX VELOCITY: 1.3 M/S
ECHO MV MEAN GRADIENT: 2 MMHG
ECHO MV MEAN VELOCITY: 0.7 M/S
ECHO MV PEAK GRADIENT: 6 MMHG
ECHO MV PRESSURE HALF TIME (PHT): 44.1 MS
ECHO MV VTI: 27.4 CM
ECHO PV MAX VELOCITY: 1.2 M/S
ECHO PV MEAN GRADIENT: 2 MMHG
ECHO PV MEAN VELOCITY: 0.7 M/S
ECHO PV PEAK GRADIENT: 5 MMHG
ECHO PV VTI: 21.5 CM
ECHO PVEIN A DURATION: 140.8 MS
ECHO PVEIN A VELOCITY: 0.3 M/S
ECHO PVEIN PEAK D VELOCITY: 0.5 M/S
ECHO PVEIN PEAK S VELOCITY: 0.7 M/S
ECHO PVEIN S/D RATIO: 1.4
ECHO RIGHT VENTRICULAR SYSTOLIC PRESSURE (RVSP): 22 MMHG
ECHO RV INTERNAL DIMENSION: 3 CM
ECHO RV LONGITUDINAL DIMENSION: 7.4 CM
ECHO RV MID DIMENSION: 2.8 CM
ECHO RVOT MEAN GRADIENT: 1 MMHG
ECHO RVOT PEAK GRADIENT: 2 MMHG
ECHO RVOT PEAK VELOCITY: 0.7 M/S
ECHO RVOT VTI: 16.6 CM
ECHO TV REGURGITANT MAX VELOCITY: 2.19 M/S
ECHO TV REGURGITANT PEAK GRADIENT: 19 MMHG

## 2024-05-20 PROCEDURE — A9503 TC99M MEDRONATE: HCPCS | Performed by: RADIOLOGY

## 2024-05-20 PROCEDURE — 78306 BONE IMAGING WHOLE BODY: CPT | Performed by: SURGERY

## 2024-05-20 PROCEDURE — 93306 TTE W/DOPPLER COMPLETE: CPT

## 2024-05-20 PROCEDURE — 6360000004 HC RX CONTRAST MEDICATION: Performed by: RADIOLOGY

## 2024-05-20 PROCEDURE — 71260 CT THORAX DX C+: CPT

## 2024-05-20 PROCEDURE — 3430000000 HC RX DIAGNOSTIC RADIOPHARMACEUTICAL: Performed by: RADIOLOGY

## 2024-05-20 RX ORDER — TC 99M MEDRONATE 20 MG/10ML
25 INJECTION, POWDER, LYOPHILIZED, FOR SOLUTION INTRAVENOUS
Status: COMPLETED | OUTPATIENT
Start: 2024-05-20 | End: 2024-05-20

## 2024-05-20 RX ADMIN — IOPAMIDOL 75 ML: 755 INJECTION, SOLUTION INTRAVENOUS at 10:33

## 2024-05-20 RX ADMIN — TC 99M MEDRONATE 25 MILLICURIE: 20 INJECTION, POWDER, LYOPHILIZED, FOR SOLUTION INTRAVENOUS at 09:59

## 2024-05-21 NOTE — TELEPHONE ENCOUNTER
RN verified that  called and spoke to .       Electronically signed by Sonja Child RN on 5/21/24 at 8:39 AM EDT

## 2024-05-22 LAB — SURGICAL PATHOLOGY REPORT: NORMAL

## 2024-05-23 LAB — SURGICAL PATHOLOGY REPORT: NORMAL

## 2024-06-10 ENCOUNTER — TELEPHONE (OUTPATIENT)
Dept: HEMATOLOGY | Age: 64
End: 2024-06-10

## 2024-06-10 NOTE — TELEPHONE ENCOUNTER
I called and spoke to patient and offered to make her a 2 month follow up appt with Dr. Bridges for some time in July.  She declined to make this appt at this time stating once she is feeling better she will call back to make a follow up appt.    Electronically signed by Mel Diaz RN on 6/10/2024 at 2:14 PM

## 2024-06-25 ENCOUNTER — HOSPITAL ENCOUNTER (OUTPATIENT)
Dept: GENERAL RADIOLOGY | Age: 64
End: 2024-06-25
Payer: COMMERCIAL

## 2024-06-25 ENCOUNTER — HOSPITAL ENCOUNTER (OUTPATIENT)
Dept: INTERVENTIONAL RADIOLOGY/VASCULAR | Age: 64
Discharge: HOME | End: 2024-06-27
Payer: COMMERCIAL

## 2024-06-25 DIAGNOSIS — C50.412 MALIGNANT NEOPLASM OF UPPER-OUTER QUADRANT OF LEFT FEMALE BREAST, UNSPECIFIED ESTROGEN RECEPTOR STATUS (HCC): ICD-10-CM

## 2024-06-25 PROCEDURE — 36598 INJ W/FLUOR EVAL CV DEVICE: CPT

## 2024-06-25 PROCEDURE — 6360000004 HC RX CONTRAST MEDICATION: Performed by: RADIOLOGY

## 2024-06-25 RX ADMIN — IOPAMIDOL 4 ML: 755 INJECTION, SOLUTION INTRAVENOUS at 13:23

## 2024-06-25 NOTE — PROGRESS NOTES
Using sterile technique, port accessed with 20guage, 0.75\" needle.  Scant amount of blood aspirated.  Flushed easily with normal saline.  Dye injected per radiology tech. Images reviewed by Dr. Lux.  Patient ok for discharge.  Port flushed with 10cc saline and 5cc heparin and de-accessed.  Bandaid applied to site.

## 2024-06-28 ENCOUNTER — HOSPITAL ENCOUNTER (EMERGENCY)
Age: 64
Discharge: HOME | End: 2024-06-28
Attending: EMERGENCY MEDICINE
Payer: COMMERCIAL

## 2024-06-28 ENCOUNTER — APPOINTMENT (OUTPATIENT)
Dept: GENERAL RADIOLOGY | Age: 64
End: 2024-06-28
Payer: COMMERCIAL

## 2024-06-28 VITALS
TEMPERATURE: 97.8 F | DIASTOLIC BLOOD PRESSURE: 63 MMHG | SYSTOLIC BLOOD PRESSURE: 109 MMHG | WEIGHT: 150 LBS | BODY MASS INDEX: 30.3 KG/M2 | RESPIRATION RATE: 18 BRPM | OXYGEN SATURATION: 100 % | HEART RATE: 108 BPM

## 2024-06-28 DIAGNOSIS — N30.00 ACUTE CYSTITIS WITHOUT HEMATURIA: Primary | ICD-10-CM

## 2024-06-28 DIAGNOSIS — E86.0 DEHYDRATION: ICD-10-CM

## 2024-06-28 DIAGNOSIS — Z17.1 MALIGNANT NEOPLASM OF OVERLAPPING SITES OF LEFT BREAST IN FEMALE, ESTROGEN RECEPTOR NEGATIVE (HCC): ICD-10-CM

## 2024-06-28 DIAGNOSIS — D84.9 IMMUNOCOMPROMISED (HCC): ICD-10-CM

## 2024-06-28 DIAGNOSIS — C50.812 MALIGNANT NEOPLASM OF OVERLAPPING SITES OF LEFT BREAST IN FEMALE, ESTROGEN RECEPTOR NEGATIVE (HCC): ICD-10-CM

## 2024-06-28 LAB
ALBUMIN SERPL-MCNC: 4.2 G/DL (ref 3.5–5.2)
ALP SERPL-CCNC: 137 U/L (ref 35–104)
ALT SERPL-CCNC: 19 U/L (ref 0–32)
ANION GAP SERPL CALCULATED.3IONS-SCNC: 13 MMOL/L (ref 7–16)
AST SERPL-CCNC: 16 U/L (ref 0–31)
BACTERIA URNS QL MICRO: ABNORMAL
BASOPHILS # BLD: 0.06 K/UL (ref 0–0.2)
BASOPHILS NFR BLD: 1 % (ref 0–2)
BILIRUB SERPL-MCNC: 0.5 MG/DL (ref 0–1.2)
BILIRUB UR QL STRIP: ABNORMAL
BUN SERPL-MCNC: 15 MG/DL (ref 6–23)
CALCIUM SERPL-MCNC: 9.5 MG/DL (ref 8.6–10.2)
CASTS #/AREA URNS LPF: ABNORMAL /LPF
CHLORIDE SERPL-SCNC: 102 MMOL/L (ref 98–107)
CLARITY UR: ABNORMAL
CO2 SERPL-SCNC: 20 MMOL/L (ref 22–29)
COLOR UR: YELLOW
CREAT SERPL-MCNC: 1.1 MG/DL (ref 0.5–1)
EOSINOPHIL # BLD: 0 K/UL (ref 0.05–0.5)
EOSINOPHILS RELATIVE PERCENT: 0 % (ref 0–6)
EPI CELLS #/AREA URNS HPF: ABNORMAL /HPF
ERYTHROCYTE [DISTWIDTH] IN BLOOD BY AUTOMATED COUNT: 18.6 % (ref 11.5–15)
GFR, ESTIMATED: 55 ML/MIN/1.73M2
GLUCOSE SERPL-MCNC: 165 MG/DL (ref 74–99)
GLUCOSE UR STRIP-MCNC: NEGATIVE MG/DL
HCT VFR BLD AUTO: 29.5 % (ref 34–48)
HGB BLD-MCNC: 9.2 G/DL (ref 11.5–15.5)
HGB UR QL STRIP.AUTO: ABNORMAL
KETONES UR STRIP-MCNC: ABNORMAL MG/DL
LEUKOCYTE ESTERASE UR QL STRIP: ABNORMAL
LYMPHOCYTES NFR BLD: 1.45 K/UL (ref 1.5–4)
LYMPHOCYTES RELATIVE PERCENT: 23 % (ref 20–42)
MAGNESIUM SERPL-MCNC: 2 MG/DL (ref 1.6–2.6)
MCH RBC QN AUTO: 24.7 PG (ref 26–35)
MCHC RBC AUTO-ENTMCNC: 31.2 G/DL (ref 32–34.5)
MCV RBC AUTO: 79.3 FL (ref 80–99.9)
MONOCYTES NFR BLD: 1.26 K/UL (ref 0.1–0.95)
MONOCYTES NFR BLD: 20 % (ref 2–12)
NEUTROPHILS NFR BLD: 56 % (ref 43–80)
NEUTS SEG NFR BLD: 3.53 K/UL (ref 1.8–7.3)
NITRITE UR QL STRIP: POSITIVE
PH UR STRIP: 5.5 [PH] (ref 5–9)
PLATELET # BLD AUTO: 127 K/UL (ref 130–450)
PMV BLD AUTO: 10.5 FL (ref 7–12)
POTASSIUM SERPL-SCNC: 4.6 MMOL/L (ref 3.5–5)
PROT SERPL-MCNC: 7.9 G/DL (ref 6.4–8.3)
PROT UR STRIP-MCNC: 100 MG/DL
RBC # BLD AUTO: 3.72 M/UL (ref 3.5–5.5)
RBC # BLD: ABNORMAL 10*6/UL
RBC #/AREA URNS HPF: ABNORMAL /HPF
SARS-COV-2 RDRP RESP QL NAA+PROBE: NOT DETECTED
SODIUM SERPL-SCNC: 135 MMOL/L (ref 132–146)
SP GR UR STRIP: >1.03 (ref 1–1.03)
SPECIMEN DESCRIPTION: NORMAL
UROBILINOGEN UR STRIP-ACNC: 0.2 EU/DL (ref 0–1)
WBC #/AREA URNS HPF: ABNORMAL /HPF
WBC OTHER # BLD: 6.3 K/UL (ref 4.5–11.5)

## 2024-06-28 PROCEDURE — 96374 THER/PROPH/DIAG INJ IV PUSH: CPT

## 2024-06-28 PROCEDURE — 99284 EMERGENCY DEPT VISIT MOD MDM: CPT

## 2024-06-28 PROCEDURE — 71046 X-RAY EXAM CHEST 2 VIEWS: CPT

## 2024-06-28 PROCEDURE — 80053 COMPREHEN METABOLIC PANEL: CPT

## 2024-06-28 PROCEDURE — 85025 COMPLETE CBC W/AUTO DIFF WBC: CPT

## 2024-06-28 PROCEDURE — 83735 ASSAY OF MAGNESIUM: CPT

## 2024-06-28 PROCEDURE — 87635 SARS-COV-2 COVID-19 AMP PRB: CPT

## 2024-06-28 PROCEDURE — 81001 URINALYSIS AUTO W/SCOPE: CPT

## 2024-06-28 PROCEDURE — 6360000002 HC RX W HCPCS: Performed by: EMERGENCY MEDICINE

## 2024-06-28 PROCEDURE — 87086 URINE CULTURE/COLONY COUNT: CPT

## 2024-06-28 PROCEDURE — 2580000003 HC RX 258: Performed by: EMERGENCY MEDICINE

## 2024-06-28 PROCEDURE — 96361 HYDRATE IV INFUSION ADD-ON: CPT

## 2024-06-28 RX ORDER — 0.9 % SODIUM CHLORIDE 0.9 %
1000 INTRAVENOUS SOLUTION INTRAVENOUS ONCE
Status: COMPLETED | OUTPATIENT
Start: 2024-06-28 | End: 2024-06-28

## 2024-06-28 RX ORDER — CEFDINIR 300 MG/1
300 CAPSULE ORAL 2 TIMES DAILY
Qty: 14 CAPSULE | Refills: 0 | Status: SHIPPED | OUTPATIENT
Start: 2024-06-28 | End: 2024-07-05

## 2024-06-28 RX ADMIN — SODIUM CHLORIDE 1000 ML: 9 INJECTION, SOLUTION INTRAVENOUS at 18:35

## 2024-06-28 RX ADMIN — WATER 1000 MG: 1 INJECTION INTRAMUSCULAR; INTRAVENOUS; SUBCUTANEOUS at 21:24

## 2024-06-28 ASSESSMENT — LIFESTYLE VARIABLES: HOW OFTEN DO YOU HAVE A DRINK CONTAINING ALCOHOL: NEVER

## 2024-06-28 NOTE — ED NOTES
Department of Emergency Medicine  FIRST PROVIDER TRIAGE NOTE             Independent MLP           6/28/24  4:16 PM EDT    Date of Encounter: 6/28/24   MRN: [id number]      HPI: Zuri Boggs is a 63 y.o. female who presents to the ED for Fatigue (Chronic diarrhea since April/chemo, fatigue, not eating, nausea without emesis, no pain, had chemo last wednesday)     Generalized fatigue and nausea.  Breast cancer on Chemo       ROS: Negative for Suicidal ideation or Homicidal Ideation.    PE: Gen Appearance/Constitutional: alert  CV: regular rate  Pulm: CTA bilat     Initial Plan of Care: All treatment areas with department are currently occupied. Plan to order/Initiate the following while awaiting opening in ED: labs.  Initiate Treatment-Testing, Proceed toTreatment Area When Bed Available for ED Attending/MLP to Continue Care    Electronically signed by SUSAN Corea   DD: 6/28/24

## 2024-06-28 NOTE — ED PROVIDER NOTES
ED PROVIDER NOTE    Chief Complaint   Patient presents with    Fatigue     Chronic diarrhea since April/chemo, fatigue, not eating, nausea without emesis, no pain, had chemo last wednesday       HPI:  6/28/24,   Time: 6:03 PM EDT       Zuri Boggs is a 63 y.o. female presenting to the ED for fatigue and generalized weakness.  Ongoing for the last 1 week since she received chemotherapy.  Patient is currently on chemo for breast cancer.  Has not had surgical treatment or radiation for it.  Associated rhinorrhea and diarrhea.  No fever, chills, cough, chest pain, shortness of breath, abdominal pain, nausea, vomiting.  No black or bloody stools.  Decreased appetite and p.o. intake.  Normal urine output.    Chart review: hx of breast ca on chemo, DM, HLD, gallstone ileus    Reviewed outpatient oncology progress note from 6/11/2024 by Dr. London:  DX left breast intraductal carcinoma, grade 2, HR negative, HER2 positive.      Review of Systems:     Review of Systems  Pertinent positives and negatives as stated in HPI     --------------------------------------------- PAST HISTORY ---------------------------------------------  Past Medical History:   Past Medical History:   Diagnosis Date    Breast cancer (HCC)     Found out May 2024, Dr. Preston    Diabetes mellitus (HCC)     Hyperlipidemia     Thyroid disease        Past Surgical History:   Past Surgical History:   Procedure Laterality Date    BREAST BIOPSY      COLECTOMY N/A 04/24/2024    EXPLORATORY LAPAROSCOPY SMALL  BOWEL RESECTION performed by Howie Arce MD at [facility] OR    COLONOSCOPY      12/2023 Dr. Orta    ERCP      removed gallstone    SOHA NEEDLE BIOPSY LYMPH NODE SUPERFICIAL  5/16/2024    SOHA NEEDLE BIOPSY LYMPH NODE SUPERFICIAL 5/16/2024 [facility]    SOHA US PERQ DEVICE SOFT TISSUE PLMT  FIRST LESION  5/16/2024    SOHA US PERQ DEVICE SOFT TISSUE PLMT  FIRST LESION 5/16/2024 [facility]    PORT SURGERY Left 5/17/2024    MEDIPORT INSERTION performed

## 2024-06-29 NOTE — DISCHARGE INSTRUCTIONS
Return to the ER if you have worsening pain, vomiting, unable to keep down fluids, decreased urine output, fever, or any other new or concerning symptoms.     Drink plenty of fluids.    Take all antibiotics until complete.

## 2024-06-30 LAB
MICROORGANISM SPEC CULT: ABNORMAL
MICROORGANISM SPEC CULT: ABNORMAL
SERVICE CMNT-IMP: ABNORMAL
SPECIMEN DESCRIPTION: ABNORMAL

## 2024-07-02 ENCOUNTER — TELEPHONE (OUTPATIENT)
Dept: SURGERY | Age: 64
End: 2024-07-02

## 2024-07-02 DIAGNOSIS — Z95.828 PORT-A-CATH IN PLACE: ICD-10-CM

## 2024-07-02 NOTE — TELEPHONE ENCOUNTER
Message received that patients port is not functioning properly.  Call placed to discuss, patient not available.  Message left for patient to return my call to discuss.  Electronically signed by Yany Mason on 7/2/24 at 10:01 AM EDT

## 2024-07-02 NOTE — TELEPHONE ENCOUNTER
Per the order of Dr. Arce, patient has been scheduled for Mediport removal and replacement on 2024.  Patient provided with procedure information over the phone and verbalized understanding.  Patient instructed to please contact our office with any questions.    Procedure scheduled through [facility].  Dr. Arce to enter orders.        Prior Authorization Form:      DEMOGRAPHICS:                     Patient Name:  Zuri Silva  Patient :  1960            Insurance:  Payor: youwho RADHA / Plan: youwho RADHA / Product Type: *No Product type* /   Insurance ID Number:    Payer/Plan Subscr  Sex Relation Sub. Ins. ID Effective Group Num   1. [employer]* ZURI SILVA 1960 Female Self [id number]                                   [address] [address]         DIAGNOSIS & PROCEDURE:                       Procedure/Operation: Mediport removal and replacement           CPT Code: 44845, 81276    Diagnosis:  Port malfunction, port in place    ICD10 Code: I87.8    Location:  [facility]    Surgeon:  Claude    SCHEDULING INFORMATION:                          Date: 2024    Time: TBD              Anesthesia:  MAC/TIVA                                                       Status:  Outpatient        Special Comments:         Electronically signed by aYny Mason on 2024 at 2:37 PM

## 2024-07-03 RX ORDER — SODIUM CHLORIDE 9 MG/ML
INJECTION, SOLUTION INTRAVENOUS PRN
Status: CANCELLED | OUTPATIENT
Start: 2024-07-03

## 2024-07-03 RX ORDER — SODIUM CHLORIDE 0.9 % (FLUSH) 0.9 %
5-40 SYRINGE (ML) INJECTION EVERY 12 HOURS SCHEDULED
Status: CANCELLED | OUTPATIENT
Start: 2024-07-03

## 2024-07-03 RX ORDER — SODIUM CHLORIDE, SODIUM LACTATE, POTASSIUM CHLORIDE, CALCIUM CHLORIDE 600; 310; 30; 20 MG/100ML; MG/100ML; MG/100ML; MG/100ML
INJECTION, SOLUTION INTRAVENOUS CONTINUOUS
Status: CANCELLED | OUTPATIENT
Start: 2024-07-03

## 2024-07-03 RX ORDER — SODIUM CHLORIDE 0.9 % (FLUSH) 0.9 %
5-40 SYRINGE (ML) INJECTION PRN
Status: CANCELLED | OUTPATIENT
Start: 2024-07-03

## 2024-07-03 NOTE — PROGRESS NOTES
Please call AMBULATORY CARE if you have any further questions.   Pre Admit Testing           [phone]     [facility] [phone]

## 2024-07-05 ENCOUNTER — APPOINTMENT (OUTPATIENT)
Dept: GENERAL RADIOLOGY | Age: 64
End: 2024-07-05
Attending: SURGERY
Payer: COMMERCIAL

## 2024-07-05 ENCOUNTER — ANESTHESIA (OUTPATIENT)
Dept: OPERATING ROOM | Age: 64
End: 2024-07-05
Payer: COMMERCIAL

## 2024-07-05 ENCOUNTER — HOSPITAL ENCOUNTER (OUTPATIENT)
Age: 64
Setting detail: OUTPATIENT SURGERY
Discharge: HOME | End: 2024-07-05
Attending: SURGERY | Admitting: SURGERY
Payer: COMMERCIAL

## 2024-07-05 ENCOUNTER — ANESTHESIA EVENT (OUTPATIENT)
Dept: OPERATING ROOM | Age: 64
End: 2024-07-05
Payer: COMMERCIAL

## 2024-07-05 VITALS
SYSTOLIC BLOOD PRESSURE: 96 MMHG | RESPIRATION RATE: 16 BRPM | OXYGEN SATURATION: 99 % | TEMPERATURE: 97.9 F | DIASTOLIC BLOOD PRESSURE: 53 MMHG | HEART RATE: 86 BPM

## 2024-07-05 DIAGNOSIS — I87.8 POOR VENOUS ACCESS: Primary | ICD-10-CM

## 2024-07-05 LAB
INR PPP: 1
PARTIAL THROMBOPLASTIN TIME: 30.8 SEC (ref 24.5–35.1)
PROTHROMBIN TIME: 10.7 SEC (ref 9.3–12.4)

## 2024-07-05 PROCEDURE — 7100000010 HC PHASE II RECOVERY - FIRST 15 MIN: Performed by: SURGERY

## 2024-07-05 PROCEDURE — 36561 INSERT TUNNELED CV CATH: CPT | Performed by: SURGERY

## 2024-07-05 PROCEDURE — 3700000000 HC ANESTHESIA ATTENDED CARE: Performed by: SURGERY

## 2024-07-05 PROCEDURE — 7100000011 HC PHASE II RECOVERY - ADDTL 15 MIN: Performed by: SURGERY

## 2024-07-05 PROCEDURE — C1788 PORT, INDWELLING, IMP: HCPCS | Performed by: SURGERY

## 2024-07-05 PROCEDURE — 2500000003 HC RX 250 WO HCPCS: Performed by: SURGERY

## 2024-07-05 PROCEDURE — 6360000002 HC RX W HCPCS: Performed by: NURSE ANESTHETIST, CERTIFIED REGISTERED

## 2024-07-05 PROCEDURE — 2709999900 HC NON-CHARGEABLE SUPPLY: Performed by: SURGERY

## 2024-07-05 PROCEDURE — 6360000002 HC RX W HCPCS: Performed by: SURGERY

## 2024-07-05 PROCEDURE — 99024 POSTOP FOLLOW-UP VISIT: CPT | Performed by: SURGERY

## 2024-07-05 PROCEDURE — 36590 REMOVAL TUNNELED CV CATH: CPT | Performed by: SURGERY

## 2024-07-05 PROCEDURE — 85730 THROMBOPLASTIN TIME PARTIAL: CPT

## 2024-07-05 PROCEDURE — 3600000013 HC SURGERY LEVEL 3 ADDTL 15MIN: Performed by: SURGERY

## 2024-07-05 PROCEDURE — 2580000003 HC RX 258: Performed by: SURGERY

## 2024-07-05 PROCEDURE — 71045 X-RAY EXAM CHEST 1 VIEW: CPT

## 2024-07-05 PROCEDURE — 3600000003 HC SURGERY LEVEL 3 BASE: Performed by: SURGERY

## 2024-07-05 PROCEDURE — 3700000001 HC ADD 15 MINUTES (ANESTHESIA): Performed by: SURGERY

## 2024-07-05 PROCEDURE — 85610 PROTHROMBIN TIME: CPT

## 2024-07-05 DEVICE — SMART PORT CT SINGLE TITANIUM PORT SYSTEM WITH ATTACHABLE 8.0F X 66CM POLYURETHANE CATHETER AND 8 F INTRODUCER KIT (WITH VALVED INTRODUCER)
Type: IMPLANTABLE DEVICE | Site: CHEST | Status: FUNCTIONAL
Brand: SMART PORT CT

## 2024-07-05 RX ORDER — SODIUM CHLORIDE 9 MG/ML
INJECTION, SOLUTION INTRAMUSCULAR; INTRAVENOUS; SUBCUTANEOUS PRN
Status: DISCONTINUED | OUTPATIENT
Start: 2024-07-05 | End: 2024-07-05 | Stop reason: ALTCHOICE

## 2024-07-05 RX ORDER — NALOXONE HYDROCHLORIDE 0.4 MG/ML
INJECTION, SOLUTION INTRAMUSCULAR; INTRAVENOUS; SUBCUTANEOUS PRN
Status: CANCELLED | OUTPATIENT
Start: 2024-07-05

## 2024-07-05 RX ORDER — SODIUM CHLORIDE, SODIUM LACTATE, POTASSIUM CHLORIDE, CALCIUM CHLORIDE 600; 310; 30; 20 MG/100ML; MG/100ML; MG/100ML; MG/100ML
INJECTION, SOLUTION INTRAVENOUS CONTINUOUS
Status: DISCONTINUED | OUTPATIENT
Start: 2024-07-05 | End: 2024-07-05 | Stop reason: HOSPADM

## 2024-07-05 RX ORDER — HEPARIN 100 UNIT/ML
SYRINGE INTRAVENOUS PRN
Status: DISCONTINUED | OUTPATIENT
Start: 2024-07-05 | End: 2024-07-05 | Stop reason: ALTCHOICE

## 2024-07-05 RX ORDER — PROPOFOL 10 MG/ML
INJECTION, EMULSION INTRAVENOUS PRN
Status: DISCONTINUED | OUTPATIENT
Start: 2024-07-05 | End: 2024-07-05 | Stop reason: SDUPTHER

## 2024-07-05 RX ORDER — HYDRALAZINE HYDROCHLORIDE 20 MG/ML
10 INJECTION INTRAMUSCULAR; INTRAVENOUS
Status: CANCELLED | OUTPATIENT
Start: 2024-07-05

## 2024-07-05 RX ORDER — FENTANYL CITRATE 50 UG/ML
INJECTION, SOLUTION INTRAMUSCULAR; INTRAVENOUS PRN
Status: DISCONTINUED | OUTPATIENT
Start: 2024-07-05 | End: 2024-07-05 | Stop reason: SDUPTHER

## 2024-07-05 RX ORDER — FENTANYL CITRATE 0.05 MG/ML
50 INJECTION, SOLUTION INTRAMUSCULAR; INTRAVENOUS EVERY 5 MIN PRN
Status: CANCELLED | OUTPATIENT
Start: 2024-07-05

## 2024-07-05 RX ORDER — DIPHENHYDRAMINE HYDROCHLORIDE 50 MG/ML
12.5 INJECTION INTRAMUSCULAR; INTRAVENOUS
Status: CANCELLED | OUTPATIENT
Start: 2024-07-05 | End: 2024-07-06

## 2024-07-05 RX ORDER — BUPIVACAINE HYDROCHLORIDE AND EPINEPHRINE 2.5; 5 MG/ML; UG/ML
INJECTION, SOLUTION EPIDURAL; INFILTRATION; INTRACAUDAL; PERINEURAL PRN
Status: DISCONTINUED | OUTPATIENT
Start: 2024-07-05 | End: 2024-07-05 | Stop reason: ALTCHOICE

## 2024-07-05 RX ORDER — LIDOCAINE HYDROCHLORIDE 20 MG/ML
INJECTION, SOLUTION INTRAVENOUS PRN
Status: DISCONTINUED | OUTPATIENT
Start: 2024-07-05 | End: 2024-07-05 | Stop reason: SDUPTHER

## 2024-07-05 RX ORDER — SODIUM CHLORIDE 9 MG/ML
INJECTION, SOLUTION INTRAVENOUS PRN
Status: DISCONTINUED | OUTPATIENT
Start: 2024-07-05 | End: 2024-07-05 | Stop reason: HOSPADM

## 2024-07-05 RX ORDER — SODIUM CHLORIDE 0.9 % (FLUSH) 0.9 %
5-40 SYRINGE (ML) INJECTION EVERY 12 HOURS SCHEDULED
Status: DISCONTINUED | OUTPATIENT
Start: 2024-07-05 | End: 2024-07-05 | Stop reason: HOSPADM

## 2024-07-05 RX ORDER — TRAMADOL HYDROCHLORIDE 50 MG/1
50 TABLET ORAL EVERY 6 HOURS PRN
Qty: 12 TABLET | Refills: 0 | Status: SHIPPED | OUTPATIENT
Start: 2024-07-05 | End: 2024-07-08

## 2024-07-05 RX ORDER — MIDAZOLAM HYDROCHLORIDE 1 MG/ML
2 INJECTION INTRAMUSCULAR; INTRAVENOUS
Status: CANCELLED | OUTPATIENT
Start: 2024-07-05 | End: 2024-07-06

## 2024-07-05 RX ORDER — IPRATROPIUM BROMIDE AND ALBUTEROL SULFATE 2.5; .5 MG/3ML; MG/3ML
1 SOLUTION RESPIRATORY (INHALATION)
Status: CANCELLED | OUTPATIENT
Start: 2024-07-05 | End: 2024-07-06

## 2024-07-05 RX ORDER — FENTANYL CITRATE 0.05 MG/ML
25 INJECTION, SOLUTION INTRAMUSCULAR; INTRAVENOUS EVERY 5 MIN PRN
Status: CANCELLED | OUTPATIENT
Start: 2024-07-05

## 2024-07-05 RX ORDER — LABETALOL HYDROCHLORIDE 5 MG/ML
10 INJECTION, SOLUTION INTRAVENOUS
Status: CANCELLED | OUTPATIENT
Start: 2024-07-05

## 2024-07-05 RX ORDER — KETOROLAC TROMETHAMINE 15 MG/ML
15 INJECTION, SOLUTION INTRAMUSCULAR; INTRAVENOUS
Status: CANCELLED | OUTPATIENT
Start: 2024-07-05 | End: 2024-07-06

## 2024-07-05 RX ORDER — MIDAZOLAM HYDROCHLORIDE 1 MG/ML
INJECTION INTRAMUSCULAR; INTRAVENOUS PRN
Status: DISCONTINUED | OUTPATIENT
Start: 2024-07-05 | End: 2024-07-05 | Stop reason: SDUPTHER

## 2024-07-05 RX ORDER — IBUPROFEN 800 MG/1
800 TABLET ORAL EVERY 6 HOURS PRN
Qty: 20 TABLET | Refills: 0 | Status: SHIPPED | OUTPATIENT
Start: 2024-07-05

## 2024-07-05 RX ORDER — SODIUM CHLORIDE 0.9 % (FLUSH) 0.9 %
5-40 SYRINGE (ML) INJECTION PRN
Status: DISCONTINUED | OUTPATIENT
Start: 2024-07-05 | End: 2024-07-05 | Stop reason: HOSPADM

## 2024-07-05 RX ADMIN — PHENYLEPHRINE HYDROCHLORIDE 100 MCG: 10 INJECTION INTRAVENOUS at 08:20

## 2024-07-05 RX ADMIN — CEFAZOLIN 2000 MG: 2 INJECTION, POWDER, FOR SOLUTION INTRAMUSCULAR; INTRAVENOUS at 08:15

## 2024-07-05 RX ADMIN — PROPOFOL 50 MCG/KG/MIN: 10 INJECTION, EMULSION INTRAVENOUS at 08:14

## 2024-07-05 RX ADMIN — MIDAZOLAM 2 MG: 1 INJECTION INTRAMUSCULAR; INTRAVENOUS at 08:08

## 2024-07-05 RX ADMIN — SODIUM CHLORIDE, POTASSIUM CHLORIDE, SODIUM LACTATE AND CALCIUM CHLORIDE: 600; 310; 30; 20 INJECTION, SOLUTION INTRAVENOUS at 06:47

## 2024-07-05 RX ADMIN — PROPOFOL 100 MG: 10 INJECTION, EMULSION INTRAVENOUS at 08:13

## 2024-07-05 RX ADMIN — LIDOCAINE HYDROCHLORIDE 100 MG: 20 INJECTION, SOLUTION INTRAVENOUS at 08:13

## 2024-07-05 RX ADMIN — FENTANYL CITRATE 50 MCG: 50 INJECTION, SOLUTION INTRAMUSCULAR; INTRAVENOUS at 08:13

## 2024-07-05 RX ADMIN — PHENYLEPHRINE HYDROCHLORIDE 100 MCG: 10 INJECTION INTRAVENOUS at 08:34

## 2024-07-05 RX ADMIN — PHENYLEPHRINE HYDROCHLORIDE 100 MCG: 10 INJECTION INTRAVENOUS at 08:15

## 2024-07-05 ASSESSMENT — PAIN DESCRIPTION - DESCRIPTORS: DESCRIPTORS: SORE

## 2024-07-05 ASSESSMENT — PAIN - FUNCTIONAL ASSESSMENT: PAIN_FUNCTIONAL_ASSESSMENT: 0-10

## 2024-07-05 NOTE — DISCHARGE INSTRUCTIONS
Patient Discharge Instructions  Discharge Date:  7/5/2024    Discharged To:  Home    RESUME ACTIVITY:      BATHING:  May shower 24hrs after surgery, may bathe 3 days after surgery    DRIVING: No driving while on pain medications    RETURN TO WORK: after follow up appointment    WALKING:  Yes    SEXUAL ACTIVITY: Yes    STAIRS:  Yes    DIET: common adult    BOWELS: constipation is a side effect of your pain meds, take a daily laxative (miralax, dulcolax, etc.) as needed to keep your bowels moving as they normally do, do not go 2-3 days without having a bowel movement.    Pain medications;   Pain meds cause constipation so pay close attention to the \"bowels\" topic above.     SPECIAL INSTRUCTIONS:     Call the office at  if you have a fever > 100 F, or if your incision becomes red, tender, or drains more than a small amount of clear fluid.    Call  for follow up appointment with Dr. Arce in:  as needed

## 2024-07-05 NOTE — OP NOTE
"Subjective:       Patient ID: Braulio Kim Jr is a 53 y.o. male.    Chief Complaint: Establish Care; Rash; Spasms; and Hand Pain (L hand index finger)    Establish care    Bipolar  Seeing [facility]  Stable  Taking meds as prescribed    B hand pain  Numbness  Burning  Had carpal tunnel surgery        Review of Systems   Constitutional: Negative for activity change, appetite change, fatigue and fever.   HENT: Negative for congestion, dental problem, ear discharge, hearing loss, postnasal drip, sinus pain, sneezing and trouble swallowing.    Eyes: Negative for photophobia and discharge.   Respiratory: Negative for apnea, cough and shortness of breath.    Cardiovascular: Negative for chest pain.   Gastrointestinal: Negative for abdominal distention, abdominal pain, blood in stool, diarrhea, nausea and vomiting.   Endocrine: Negative for cold intolerance.   Genitourinary: Negative for difficulty urinating, flank pain, frequency, hematuria and testicular pain.   Musculoskeletal: Positive for myalgias. Negative for arthralgias.   Skin: Negative for color change.   Allergic/Immunologic: Negative for environmental allergies, food allergies and immunocompromised state.   Neurological: Positive for weakness and numbness. Negative for dizziness, syncope and headaches.   Hematological: Negative for adenopathy. Does not bruise/bleed easily.   Psychiatric/Behavioral: Negative for agitation, confusion, decreased concentration, hallucinations, self-injury and sleep disturbance.   All other systems reviewed and are negative.        Reviewed family, medical, surgical, and social history.    Objective:      /83 (BP Location: Left arm, Patient Position: Sitting, BP Method: Medium (Automatic))   Pulse 70   Resp 20   Ht 5' 7.5" (1.715 m)   Wt 98.8 kg (217 lb 14.4 oz)   SpO2 96%   BMI 33.62 kg/m²   Physical Exam   Constitutional: He is oriented to person, place, and time. He appears well-developed and well-nourished. No distress. " Zuri Boggs  1960      DATE OF PROCEDURE: 7/5/2024     SURGEON: Howie Arce M.D.     ASSISTANT: caitlin     PREOPERATIVE DIAGNOSIS: poor venous access and port malfunction.     POSTOPERATIVE DIAGNOSIS: Same    OPERATION: removal and Insertion of left subclavian central venous catheter with subcutaneous reservoir, fluoroscopy     ANESTHESIA: LMAC     ESTIMATED BLOOD LOSS: Minimal     COMPLICATIONS: None    DESCRIPTION OF PROCEDURE: The patient was identified and the procedure was confirmed. The left neck and chest were prepped and draped in the usual sterile fashion. Next, local anesthesic was used for local anesthesia. The previous incision was opened with scalpel and the previous port was excised and sent for specimen. The left subclavian vein was percutaneously entered and a guidewire was advanced into the superior vena cava under fluoroscopic guidance.   The introducer was passed over the wire then the catheter was passed through the introducer and the tip was positioned in the superior vena cava right atrial junction under fluoroscopic guidance. The catheter was connected to the reservoir and the locking hub was engaged.  The port was noted to withdrawal and flush blood easily and was flushed with heparinized saline solution. The reservoir was secured in the pocket with vicryl sutures.  Hemostasis was assured and the incisions were irrigated.  The incisions were approximated with Vicryl sutures and Dermabond was applied to the incisions in the operating room.  Needle, sponge, and instrument counts were reported as correct times two.  The patient tolerated the procedure and was transferred back to the floor in satisfactory condition.       Howie Arce MD  8:15 AM  7/5/2024       HENT:   Head: Normocephalic and atraumatic.   Nose: Nose normal.   Mouth/Throat: Oropharynx is clear and moist. No oropharyngeal exudate.   Eyes: Pupils are equal, round, and reactive to light. Conjunctivae and EOM are normal.   Neck: Normal range of motion. No thyromegaly present.   Cardiovascular: Normal rate, regular rhythm, normal heart sounds and intact distal pulses. Exam reveals no gallop and no friction rub.   No murmur heard.  Pulmonary/Chest: Effort normal and breath sounds normal. No respiratory distress. He has no wheezes. He has no rales.   Abdominal: Soft. He exhibits no distension. There is no tenderness. There is no guarding.   Musculoskeletal: Normal range of motion. He exhibits tenderness and deformity. He exhibits no edema.   Neurological: He is alert and oriented to person, place, and time. He displays normal reflexes. No cranial nerve deficit or sensory deficit. He exhibits normal muscle tone. Coordination normal.   Skin: Skin is warm and dry. No rash noted. He is not diaphoretic. No erythema. No pallor.   Psychiatric: He has a normal mood and affect. His behavior is normal. Judgment and thought content normal.   Nursing note and vitals reviewed.      Assessment:       1. Neuropathy    2. Hepatitis    3. Arthralgia of both hands    4. Bipolar 1 disorder        Plan:       Neuropathy  -     Cancel: CBC auto differential; Future; Expected date: 05/13/2019  -     Cancel: Comprehensive metabolic panel; Future; Expected date: 05/13/2019  -     Hemoglobin A1c; Future; Expected date: 05/13/2019  -     Lipid panel; Future; Expected date: 05/13/2019  -     TSH; Future; Expected date: 05/13/2019  -     T4, free; Future; Expected date: 05/13/2019  -     Vitamin B12; Future; Expected date: 05/13/2019  -     Folate; Future; Expected date: 05/13/2019  -     pregabalin (LYRICA) 75 MG capsule; Take 1 capsule (75 mg total) by mouth 2 (two) times daily.  Dispense: 60 capsule; Refill: 2  -     Protime-INR;  Future; Expected date: 05/13/2019  -     Sedimentation rate; Future; Expected date: 05/13/2019  -     C-reactive protein; Future; Expected date: 05/13/2019  -     BRUNA Screen w/Reflex; Future; Expected date: 05/13/2019  -     Rheumatoid factor; Future; Expected date: 05/13/2019  -     Uric acid; Future; Expected date: 05/13/2019  -     CBC auto differential; Future; Expected date: 05/13/2019  -     Comprehensive metabolic panel; Future; Expected date: 05/13/2019    Hepatitis  -     Protime-INR; Future; Expected date: 05/13/2019  -     Sedimentation rate; Future; Expected date: 05/13/2019  -     C-reactive protein; Future; Expected date: 05/13/2019  -     BRUNA Screen w/Reflex; Future; Expected date: 05/13/2019  -     Rheumatoid factor; Future; Expected date: 05/13/2019  -     Uric acid; Future; Expected date: 05/13/2019  -     CBC auto differential; Future; Expected date: 05/13/2019  -     Comprehensive metabolic panel; Future; Expected date: 05/13/2019    Arthralgia of both hands  -     Protime-INR; Future; Expected date: 05/13/2019  -     Sedimentation rate; Future; Expected date: 05/13/2019  -     C-reactive protein; Future; Expected date: 05/13/2019  -     BRUNA Screen w/Reflex; Future; Expected date: 05/13/2019  -     Rheumatoid factor; Future; Expected date: 05/13/2019  -     Uric acid; Future; Expected date: 05/13/2019  -     CBC auto differential; Future; Expected date: 05/13/2019  -     Comprehensive metabolic panel; Future; Expected date: 05/13/2019    Bipolar 1 disorder  -     Lithium level; Future; Expected date: 05/13/2019            Risks, benefits, and side effects were discussed with the patient. All questions were answered to the fullest satisfaction of the patient, and pt verbalized understanding and agreement to treatment plan. Pt was to call with any new or worsening symptoms, or present to the ER.

## 2024-07-05 NOTE — ANESTHESIA PRE PROCEDURE
Endo/Other:    (+) Diabetes, hypothyroidism::., malignancy/cancer (Breast cancer).                 Abdominal:             Vascular: negative vascular ROS.         Other Findings:             Anesthesia Plan      MAC     ASA 3       Induction: intravenous.      Anesthetic plan and risks discussed with patient.      Plan discussed with CRNA.                DOS STAFF ADDENDUM:    Pt seen and examined, chart reviewed (including anesthesia, drug and allergy history).       Anesthetic plan, risks, benefits, alternatives, and personnel involved discussed with patient.  Patient verbalized an understanding and agrees to proceed.  Plan discussed with care team members and agreed upon.    Teto Vuong MD  Staff Anesthesiologist  7:00 AM    Teto Vuong MD   7/5/2024

## 2024-07-05 NOTE — ANESTHESIA POSTPROCEDURE EVALUATION
Department of Anesthesiology  Postprocedure Note    Patient: Zuri Boggs  MRN: [id number]  YOB: 1960  Date of evaluation: 7/5/2024    Procedure Summary       Date: 07/05/24 Room / Location: [address]    Anesthesia Start: 0808 Anesthesia Stop: 0843    Procedure: MEDIPORT REMOVAL AND REPLACEMENT (Chest) Diagnosis:       Port-A-Cath in place      (Port-A-Cath in place [Z95.828])    Surgeons: Howie Arce MD Responsible Provider: Teto Vuong MD    Anesthesia Type: MAC ASA Status: 3            Anesthesia Type: No value filed.    Mary Beth Phase I: Mary Beth Score: 10    Mary Beth Phase II: Mary Beth Score: 10    Anesthesia Post Evaluation    Patient location during evaluation: PACU  Patient participation: complete - patient participated  Level of consciousness: awake and alert  Airway patency: patent  Nausea & Vomiting: no nausea and no vomiting  Cardiovascular status: hemodynamically stable  Respiratory status: acceptable  Hydration status: euvolemic  Pain management: satisfactory to patient    No notable events documented.

## 2024-07-05 NOTE — H&P
General Surgery History and Physical    Patient's Name/Date of Birth: Zuri Boggs / 1960    Date: July 5, 2024     Surgeon: Howie Arce M.D.    PCP: Winter Sarkar MD     Chief Complaint: poor venous access with breast cancer     HPI:   Zuri Boggs is a 63 y.o. female who presents for evaluation of cancer and poor venous access for port replacement.       Past Medical History:   Diagnosis Date    Breast cancer (HCC)     Found out May 2024, Dr. Preston    Diabetes mellitus (HCC)     Hyperlipidemia     Thyroid disease        Past Surgical History:   Procedure Laterality Date    BREAST BIOPSY      COLECTOMY N/A 04/24/2024    EXPLORATORY LAPAROSCOPY SMALL  BOWEL RESECTION performed by Howie Arce MD at [facility] OR    COLONOSCOPY      12/2023 Dr. Orta    ERCP      removed gallstone    SOHA NEEDLE BIOPSY LYMPH NODE SUPERFICIAL  5/16/2024    SOHA NEEDLE BIOPSY LYMPH NODE SUPERFICIAL 5/16/2024 [facility]    SOHA US PERQ DEVICE SOFT TISSUE PLMT  FIRST LESION  5/16/2024    SOHA US PERQ DEVICE SOFT TISSUE PLMT  FIRST LESION 5/16/2024 [facility]    PORT SURGERY Left 5/17/2024    MEDIPORT INSERTION performed by Howie Arce MD at [facility] OR       Current Facility-Administered Medications   Medication Dose Route Frequency Provider Last Rate Last Admin    lactated ringers IV soln infusion   IntraVENous Continuous Howie Arce  mL/hr at 07/05/24 0647 New Bag at 07/05/24 0647    sodium chloride flush 0.9 % injection 5-40 mL  5-40 mL IntraVENous 2 times per day Howie Arce MD        sodium chloride flush 0.9 % injection 5-40 mL  5-40 mL IntraVENous PRN Howie Arce MD        0.9 % sodium chloride infusion   IntraVENous PRN Howie Arce MD        ceFAZolin (ANCEF) 2,000 mg in sterile water 20 mL IV syringe  2,000 mg IntraVENous On Call to OR Howie Arce MD           No Known Allergies    The patient has a family history that is negative for severe cardiovascular or respiratory  issues, negative for reaction to anesthesia.    Social History     Socioeconomic History    Marital status:      Spouse name: Not on file    Number of children: Not on file    Years of education: Not on file    Highest education level: Not on file   Occupational History    Not on file   Tobacco Use    Smoking status: Never     Passive exposure: Never    Smokeless tobacco: Never   Vaping Use    Vaping Use: Never used   Substance and Sexual Activity    Alcohol use: Never    Drug use: Never    Sexual activity: Defer   Other Topics Concern    Not on file   Social History Narrative    Not on file     Social Determinants of Health     Financial Resource Strain: Not on file   Food Insecurity: Not on file   Transportation Needs: Not on file   Physical Activity: Not on file   Stress: Not on file   Social Connections: Not on file   Intimate Partner Violence: Not on file   Housing Stability: Not on file           Review of Systems  Review of Systems -  General ROS: negative for - chills, fatigue or malaise  ENT ROS: negative for - hearing change, nasal congestion or nasal discharge  Allergy and Immunology ROS: negative for - hives, itchy/watery eyes or nasal congestion  Hematological and Lymphatic ROS: negative for - blood clots, blood transfusions, bruising or fatigue  Endocrine ROS: negative for - malaise/lethargy, mood swings, palpitations or polydipsia/polyuria  Breast ROS: negative for - new or changing breast lumps or nipple changes  Respiratory ROS: negative for - sputum changes, stridor, tachypnea or wheezing  Cardiovascular ROS: negative for - irregular heartbeat, loss of consciousness, murmur or orthopnea  Gastrointestinal ROS: negative for - constipation, diarrhea, gas/bloating, heartburn or hematemesis  Genito-Urinary ROS: negative for -  genital discharge, genital ulcers or hematuria  Musculoskeletal ROS: negative for - gait disturbance, muscle pain or muscular weakness    Physical exam:   BP (!) 109/56

## 2024-07-22 ENCOUNTER — APPOINTMENT (OUTPATIENT)
Dept: CT IMAGING | Age: 64
DRG: 683 | End: 2024-07-22
Payer: COMMERCIAL

## 2024-07-22 ENCOUNTER — HOSPITAL ENCOUNTER (INPATIENT)
Age: 64
LOS: 2 days | Discharge: HOME | DRG: 683 | End: 2024-07-25
Attending: EMERGENCY MEDICINE | Admitting: INTERNAL MEDICINE
Payer: COMMERCIAL

## 2024-07-22 DIAGNOSIS — R53.1 GENERAL WEAKNESS: ICD-10-CM

## 2024-07-22 DIAGNOSIS — R55 SYNCOPE AND COLLAPSE: ICD-10-CM

## 2024-07-22 DIAGNOSIS — S09.90XA CLOSED HEAD INJURY, INITIAL ENCOUNTER: ICD-10-CM

## 2024-07-22 DIAGNOSIS — E83.51 HYPOCALCEMIA: ICD-10-CM

## 2024-07-22 DIAGNOSIS — N28.9 ACUTE RENAL INSUFFICIENCY: Primary | ICD-10-CM

## 2024-07-22 DIAGNOSIS — S00.03XA HEMATOMA OF SCALP, INITIAL ENCOUNTER: ICD-10-CM

## 2024-07-22 DIAGNOSIS — E87.6 HYPOKALEMIA: ICD-10-CM

## 2024-07-22 DIAGNOSIS — E83.42 HYPOMAGNESEMIA: ICD-10-CM

## 2024-07-22 PROBLEM — I95.1 SYNCOPE DUE TO ORTHOSTATIC HYPOTENSION: Status: ACTIVE | Noted: 2024-07-22

## 2024-07-22 PROBLEM — N17.9 AKI (ACUTE KIDNEY INJURY) (HCC): Status: ACTIVE | Noted: 2024-07-22

## 2024-07-22 PROBLEM — C50.912 INVASIVE DUCTAL CARCINOMA OF BREAST, FEMALE, LEFT (HCC): Status: ACTIVE | Noted: 2024-07-22

## 2024-07-22 LAB
ALBUMIN SERPL-MCNC: 4.1 G/DL (ref 3.5–5.2)
ALP SERPL-CCNC: 179 U/L (ref 35–104)
ALT SERPL-CCNC: 14 U/L (ref 0–32)
ANION GAP SERPL CALCULATED.3IONS-SCNC: 14 MMOL/L (ref 7–16)
ANION GAP SERPL CALCULATED.3IONS-SCNC: 17 MMOL/L (ref 7–16)
AST SERPL-CCNC: 12 U/L (ref 0–31)
B PARAP IS1001 DNA NPH QL NAA+NON-PROBE: NOT DETECTED
B PERT DNA SPEC QL NAA+PROBE: NOT DETECTED
BASOPHILS # BLD: 0 K/UL (ref 0–0.2)
BASOPHILS NFR BLD: 0 % (ref 0–2)
BILIRUB SERPL-MCNC: 0.3 MG/DL (ref 0–1.2)
BILIRUB UR QL STRIP: ABNORMAL
BNP SERPL-MCNC: <36 PG/ML (ref 0–450)
BUN SERPL-MCNC: 31 MG/DL (ref 6–23)
BUN SERPL-MCNC: 36 MG/DL (ref 6–23)
C PNEUM DNA NPH QL NAA+NON-PROBE: NOT DETECTED
CALCIUM SERPL-MCNC: 7.5 MG/DL (ref 8.6–10.2)
CALCIUM SERPL-MCNC: 8.8 MG/DL (ref 8.6–10.2)
CASTS #/AREA URNS LPF: ABNORMAL /LPF
CHLORIDE SERPL-SCNC: 108 MMOL/L (ref 98–107)
CHLORIDE SERPL-SCNC: 115 MMOL/L (ref 98–107)
CLARITY UR: ABNORMAL
CO2 SERPL-SCNC: 10 MMOL/L (ref 22–29)
CO2 SERPL-SCNC: 11 MMOL/L (ref 22–29)
COLOR UR: YELLOW
CREAT SERPL-MCNC: 1.7 MG/DL (ref 0.5–1)
CREAT SERPL-MCNC: 2 MG/DL (ref 0.5–1)
CREAT UR-MCNC: 109 MG/DL (ref 29–226)
EOSINOPHIL # BLD: 0 K/UL (ref 0.05–0.5)
EOSINOPHILS RELATIVE PERCENT: 0 % (ref 0–6)
EPI CELLS #/AREA URNS HPF: ABNORMAL /HPF
ERYTHROCYTE [DISTWIDTH] IN BLOOD BY AUTOMATED COUNT: 20 % (ref 11.5–15)
FLUAV RNA NPH QL NAA+NON-PROBE: NOT DETECTED
FLUBV RNA NPH QL NAA+NON-PROBE: NOT DETECTED
GFR, ESTIMATED: 28 ML/MIN/1.73M2
GFR, ESTIMATED: 34 ML/MIN/1.73M2
GLUCOSE SERPL-MCNC: 112 MG/DL (ref 74–99)
GLUCOSE SERPL-MCNC: 130 MG/DL (ref 74–99)
GLUCOSE UR STRIP-MCNC: NEGATIVE MG/DL
HADV DNA NPH QL NAA+NON-PROBE: NOT DETECTED
HCOV 229E RNA NPH QL NAA+NON-PROBE: NOT DETECTED
HCOV HKU1 RNA NPH QL NAA+NON-PROBE: NOT DETECTED
HCOV NL63 RNA NPH QL NAA+NON-PROBE: NOT DETECTED
HCOV OC43 RNA NPH QL NAA+NON-PROBE: NOT DETECTED
HCT VFR BLD AUTO: 25.6 % (ref 34–48)
HGB BLD-MCNC: 8.2 G/DL (ref 11.5–15.5)
HGB UR QL STRIP.AUTO: NEGATIVE
HMPV RNA NPH QL NAA+NON-PROBE: NOT DETECTED
HPIV1 RNA NPH QL NAA+NON-PROBE: NOT DETECTED
HPIV2 RNA NPH QL NAA+NON-PROBE: NOT DETECTED
HPIV3 RNA NPH QL NAA+NON-PROBE: NOT DETECTED
HPIV4 RNA NPH QL NAA+NON-PROBE: NOT DETECTED
INFLUENZA A BY PCR: NOT DETECTED
INFLUENZA B BY PCR: NOT DETECTED
KETONES UR STRIP-MCNC: ABNORMAL MG/DL
LACTATE BLDV-SCNC: 1.2 MMOL/L (ref 0.5–1.9)
LACTATE BLDV-SCNC: 1.6 MMOL/L (ref 0.5–1.9)
LEUKOCYTE ESTERASE UR QL STRIP: NEGATIVE
LYMPHOCYTES NFR BLD: 0.1 K/UL (ref 1.5–4)
LYMPHOCYTES RELATIVE PERCENT: 1 % (ref 20–42)
M PNEUMO DNA NPH QL NAA+NON-PROBE: NOT DETECTED
MAGNESIUM SERPL-MCNC: 1.3 MG/DL (ref 1.6–2.6)
MAGNESIUM SERPL-MCNC: 1.6 MG/DL (ref 1.6–2.6)
MCH RBC QN AUTO: 25.6 PG (ref 26–35)
MCHC RBC AUTO-ENTMCNC: 32 G/DL (ref 32–34.5)
MCV RBC AUTO: 80 FL (ref 80–99.9)
METAMYELOCYTES ABSOLUTE COUNT: 0.19 K/UL (ref 0–0.12)
METAMYELOCYTES: 2 % (ref 0–1)
MONOCYTES NFR BLD: 0.57 K/UL (ref 0.1–0.95)
MONOCYTES NFR BLD: 5 % (ref 2–12)
NEUTROPHILS NFR BLD: 92 % (ref 43–80)
NEUTS SEG NFR BLD: 10.14 K/UL (ref 1.8–7.3)
NITRITE UR QL STRIP: NEGATIVE
OSMOLALITY SERPL: 301 MOSM/KG (ref 285–310)
OSMOLALITY UR: 497 MOSM/KG (ref 300–900)
PH UR STRIP: 5.5 [PH] (ref 5–9)
PLATELET # BLD AUTO: 131 K/UL (ref 130–450)
PMV BLD AUTO: 10.3 FL (ref 7–12)
POTASSIUM SERPL-SCNC: 2.8 MMOL/L (ref 3.5–5)
POTASSIUM SERPL-SCNC: 3.2 MMOL/L (ref 3.5–5)
PROCALCITONIN SERPL-MCNC: 0.24 NG/ML (ref 0–0.08)
PROT SERPL-MCNC: 6.9 G/DL (ref 6.4–8.3)
PROT UR STRIP-MCNC: 100 MG/DL
RBC # BLD AUTO: 3.2 M/UL (ref 3.5–5.5)
RBC # BLD: ABNORMAL 10*6/UL
RBC #/AREA URNS HPF: ABNORMAL /HPF
RSV RNA NPH QL NAA+NON-PROBE: NOT DETECTED
RV+EV RNA NPH QL NAA+NON-PROBE: NOT DETECTED
SARS-COV-2 RDRP RESP QL NAA+PROBE: NOT DETECTED
SARS-COV-2 RNA NPH QL NAA+NON-PROBE: NOT DETECTED
SODIUM SERPL-SCNC: 136 MMOL/L (ref 132–146)
SODIUM SERPL-SCNC: 139 MMOL/L (ref 132–146)
SODIUM UR-SCNC: 28 MMOL/L
SP GR UR STRIP: >1.03 (ref 1–1.03)
SPECIMEN DESCRIPTION: NORMAL
SPECIMEN DESCRIPTION: NORMAL
T4 FREE SERPL-MCNC: 1.4 NG/DL (ref 0.9–1.7)
TROPONIN I SERPL HS-MCNC: 12 NG/L (ref 0–9)
TROPONIN I SERPL HS-MCNC: 14 NG/L (ref 0–9)
TSH SERPL DL<=0.05 MIU/L-ACNC: 1.74 UIU/ML (ref 0.27–4.2)
UROBILINOGEN UR STRIP-ACNC: 0.2 EU/DL (ref 0–1)
WBC #/AREA URNS HPF: ABNORMAL /HPF
WBC OTHER # BLD: 11 K/UL (ref 4.5–11.5)

## 2024-07-22 PROCEDURE — G0378 HOSPITAL OBSERVATION PER HR: HCPCS

## 2024-07-22 PROCEDURE — 82570 ASSAY OF URINE CREATININE: CPT

## 2024-07-22 PROCEDURE — 96366 THER/PROPH/DIAG IV INF ADDON: CPT

## 2024-07-22 PROCEDURE — 2500000003 HC RX 250 WO HCPCS

## 2024-07-22 PROCEDURE — 93005 ELECTROCARDIOGRAM TRACING: CPT

## 2024-07-22 PROCEDURE — 83605 ASSAY OF LACTIC ACID: CPT

## 2024-07-22 PROCEDURE — 6360000002 HC RX W HCPCS

## 2024-07-22 PROCEDURE — 96365 THER/PROPH/DIAG IV INF INIT: CPT

## 2024-07-22 PROCEDURE — 2580000003 HC RX 258

## 2024-07-22 PROCEDURE — 96361 HYDRATE IV INFUSION ADD-ON: CPT

## 2024-07-22 PROCEDURE — 36415 COLL VENOUS BLD VENIPUNCTURE: CPT

## 2024-07-22 PROCEDURE — 90471 IMMUNIZATION ADMIN: CPT

## 2024-07-22 PROCEDURE — 84484 ASSAY OF TROPONIN QUANT: CPT

## 2024-07-22 PROCEDURE — 80048 BASIC METABOLIC PNL TOTAL CA: CPT

## 2024-07-22 PROCEDURE — 6360000002 HC RX W HCPCS: Performed by: INTERNAL MEDICINE

## 2024-07-22 PROCEDURE — 90714 TD VACC NO PRESV 7 YRS+ IM: CPT

## 2024-07-22 PROCEDURE — 83930 ASSAY OF BLOOD OSMOLALITY: CPT

## 2024-07-22 PROCEDURE — 99223 1ST HOSP IP/OBS HIGH 75: CPT | Performed by: INTERNAL MEDICINE

## 2024-07-22 PROCEDURE — 83935 ASSAY OF URINE OSMOLALITY: CPT

## 2024-07-22 PROCEDURE — 96376 TX/PRO/DX INJ SAME DRUG ADON: CPT

## 2024-07-22 PROCEDURE — 84439 ASSAY OF FREE THYROXINE: CPT

## 2024-07-22 PROCEDURE — 81001 URINALYSIS AUTO W/SCOPE: CPT

## 2024-07-22 PROCEDURE — 87040 BLOOD CULTURE FOR BACTERIA: CPT

## 2024-07-22 PROCEDURE — 71275 CT ANGIOGRAPHY CHEST: CPT

## 2024-07-22 PROCEDURE — 84145 PROCALCITONIN (PCT): CPT

## 2024-07-22 PROCEDURE — 96368 THER/DIAG CONCURRENT INF: CPT

## 2024-07-22 PROCEDURE — 70460 CT HEAD/BRAIN W/DYE: CPT

## 2024-07-22 PROCEDURE — 6370000000 HC RX 637 (ALT 250 FOR IP)

## 2024-07-22 PROCEDURE — 0202U NFCT DS 22 TRGT SARS-COV-2: CPT

## 2024-07-22 PROCEDURE — 80053 COMPREHEN METABOLIC PANEL: CPT

## 2024-07-22 PROCEDURE — 6370000000 HC RX 637 (ALT 250 FOR IP): Performed by: INTERNAL MEDICINE

## 2024-07-22 PROCEDURE — 87502 INFLUENZA DNA AMP PROBE: CPT

## 2024-07-22 PROCEDURE — 83880 ASSAY OF NATRIURETIC PEPTIDE: CPT

## 2024-07-22 PROCEDURE — 87635 SARS-COV-2 COVID-19 AMP PRB: CPT

## 2024-07-22 PROCEDURE — 83735 ASSAY OF MAGNESIUM: CPT

## 2024-07-22 PROCEDURE — 6360000004 HC RX CONTRAST MEDICATION: Performed by: RADIOLOGY

## 2024-07-22 PROCEDURE — 84300 ASSAY OF URINE SODIUM: CPT

## 2024-07-22 PROCEDURE — 84443 ASSAY THYROID STIM HORMONE: CPT

## 2024-07-22 PROCEDURE — 99285 EMERGENCY DEPT VISIT HI MDM: CPT

## 2024-07-22 PROCEDURE — 85025 COMPLETE CBC W/AUTO DIFF WBC: CPT

## 2024-07-22 RX ORDER — PROCHLORPERAZINE MALEATE 10 MG
10 TABLET ORAL EVERY 6 HOURS PRN
COMMUNITY

## 2024-07-22 RX ORDER — POTASSIUM CHLORIDE 7.45 MG/ML
10 INJECTION INTRAVENOUS ONCE
Status: COMPLETED | OUTPATIENT
Start: 2024-07-22 | End: 2024-07-22

## 2024-07-22 RX ORDER — 0.9 % SODIUM CHLORIDE 0.9 %
1000 INTRAVENOUS SOLUTION INTRAVENOUS ONCE
Status: COMPLETED | OUTPATIENT
Start: 2024-07-22 | End: 2024-07-22

## 2024-07-22 RX ORDER — CIPROFLOXACIN 500 MG/1
500 TABLET, FILM COATED ORAL DAILY
Status: DISCONTINUED | OUTPATIENT
Start: 2024-07-22 | End: 2024-07-23

## 2024-07-22 RX ORDER — ATORVASTATIN CALCIUM 20 MG/1
20 TABLET, FILM COATED ORAL DAILY
Status: DISCONTINUED | OUTPATIENT
Start: 2024-07-23 | End: 2024-07-25 | Stop reason: HOSPADM

## 2024-07-22 RX ORDER — SODIUM CHLORIDE AND POTASSIUM CHLORIDE 300; 900 MG/100ML; MG/100ML
INJECTION, SOLUTION INTRAVENOUS CONTINUOUS
Status: DISCONTINUED | OUTPATIENT
Start: 2024-07-22 | End: 2024-07-23

## 2024-07-22 RX ORDER — SODIUM CHLORIDE 9 MG/ML
INJECTION, SOLUTION INTRAVENOUS ONCE
Status: COMPLETED | OUTPATIENT
Start: 2024-07-22 | End: 2024-07-22

## 2024-07-22 RX ORDER — MAGNESIUM SULFATE IN WATER 40 MG/ML
2000 INJECTION, SOLUTION INTRAVENOUS ONCE
Status: COMPLETED | OUTPATIENT
Start: 2024-07-22 | End: 2024-07-22

## 2024-07-22 RX ORDER — CALCIUM GLUCONATE 20 MG/ML
2000 INJECTION, SOLUTION INTRAVENOUS ONCE
Status: COMPLETED | OUTPATIENT
Start: 2024-07-22 | End: 2024-07-22

## 2024-07-22 RX ORDER — LANOLIN ALCOHOL/MO/W.PET/CERES
1000 CREAM (GRAM) TOPICAL
Status: DISCONTINUED | OUTPATIENT
Start: 2024-07-22 | End: 2024-07-25 | Stop reason: HOSPADM

## 2024-07-22 RX ORDER — CIPROFLOXACIN 500 MG/1
500 TABLET, FILM COATED ORAL 2 TIMES DAILY
Status: ON HOLD | COMMUNITY
End: 2024-07-25 | Stop reason: HOSPADM

## 2024-07-22 RX ORDER — PROCHLORPERAZINE MALEATE 5 MG/1
10 TABLET ORAL EVERY 6 HOURS PRN
Status: DISCONTINUED | OUTPATIENT
Start: 2024-07-22 | End: 2024-07-25 | Stop reason: HOSPADM

## 2024-07-22 RX ORDER — LEVOTHYROXINE SODIUM 0.03 MG/1
25 TABLET ORAL DAILY
Status: DISCONTINUED | OUTPATIENT
Start: 2024-07-23 | End: 2024-07-25 | Stop reason: HOSPADM

## 2024-07-22 RX ADMIN — POTASSIUM CHLORIDE AND SODIUM CHLORIDE: 900; 300 INJECTION, SOLUTION INTRAVENOUS at 20:31

## 2024-07-22 RX ADMIN — POTASSIUM BICARBONATE 50 MEQ: 978 TABLET, EFFERVESCENT ORAL at 17:58

## 2024-07-22 RX ADMIN — SODIUM CHLORIDE: 9 INJECTION, SOLUTION INTRAVENOUS at 17:53

## 2024-07-22 RX ADMIN — CLOSTRIDIUM TETANI TOXOID ANTIGEN (FORMALDEHYDE INACTIVATED) AND CORYNEBACTERIUM DIPHTHERIAE TOXOID ANTIGEN (FORMALDEHYDE INACTIVATED) 0.5 ML: 5; 2 INJECTION, SUSPENSION INTRAMUSCULAR at 11:57

## 2024-07-22 RX ADMIN — CIPROFLOXACIN HYDROCHLORIDE 500 MG: 500 TABLET, FILM COATED ORAL at 20:34

## 2024-07-22 RX ADMIN — IOPAMIDOL 75 ML: 755 INJECTION, SOLUTION INTRAVENOUS at 12:48

## 2024-07-22 RX ADMIN — POTASSIUM CHLORIDE 10 MEQ: 7.46 INJECTION, SOLUTION INTRAVENOUS at 17:56

## 2024-07-22 RX ADMIN — CALCIUM GLUCONATE 2000 MG: 20 INJECTION, SOLUTION INTRAVENOUS at 17:55

## 2024-07-22 RX ADMIN — SODIUM CHLORIDE 1000 ML: 9 INJECTION, SOLUTION INTRAVENOUS at 11:49

## 2024-07-22 RX ADMIN — MAGNESIUM SULFATE HEPTAHYDRATE 2000 MG: 40 INJECTION, SOLUTION INTRAVENOUS at 17:57

## 2024-07-22 RX ADMIN — MAGNESIUM SULFATE HEPTAHYDRATE 2000 MG: 40 INJECTION, SOLUTION INTRAVENOUS at 20:32

## 2024-07-22 RX ADMIN — SODIUM CHLORIDE 1000 ML: 9 INJECTION, SOLUTION INTRAVENOUS at 13:50

## 2024-07-22 ASSESSMENT — LIFESTYLE VARIABLES: HOW OFTEN DO YOU HAVE A DRINK CONTAINING ALCOHOL: NEVER

## 2024-07-22 NOTE — ED NOTES
Department of Emergency Medicine  FIRST PROVIDER TRIAGE NOTE             Independent MLP           7/22/24  11:04 AM EDT    Date of Encounter: 7/22/24   MRN: [id number]      HPI: Zuri Boggs is a 63 y.o. female who presents to the ED for No chief complaint on file.    Syncope  head injury - fell at [facility] unsure of loc no blood thinner hx breast Ca on chemo feels sob denies neck pain     ROS: Negative for Suicidal ideation or Homicidal Ideation.    PE: Gen Appearance/Constitutional: alert, pale   CV: regular rate  Neck no vertebral point tenderness   Pulm: CTA bilat     Initial Plan of Care: All treatment areas with department are currently occupied. Plan to order/Initiate the following while awaiting opening in ED: labs, EKG, and imaging studies.  Initiate Treatment-Testing, Proceed toTreatment Area When Bed Available for ED Attending/MLP to Continue Care    Electronically signed by SUSAN Corea   DD: 7/22/24

## 2024-07-22 NOTE — ED NOTES
Radiology Procedure Waiver   Name: Zuri Boggs  : 1960  MRN: [id number]    Date:  24    Time: 11:36 AM EDT    Benefits of immediately proceeding with Radiology exam(s) without pre-testing outweigh the risks or are not indicated as specified below and therefore the following is/are being waived:    [] Pregnancy test   [] Patients LMP on-time and regular.   [] Patient had Tubal Ligation or has other Contraception Device.   [] Patient  is Menopausal or Premenarcheal.    [] Patient had Full or Partial Hysterectomy.    [] Protocol for Iodine allergy    [] MRI Questionnaire     [x] BUN/Creatinine   [] Patient age w/no hx of renal dysfunction.   [] Patient on Dialysis.   [] Recent Normal Labs.  Electronically signed by Padilla Alfaro MD on 24 at 11:36 AM EDT          Benefits outweigh risk

## 2024-07-22 NOTE — PROGRESS NOTES
4 Eyes Skin Assessment     NAME:  Zuri Boggs  YOB: 1960  MEDICAL RECORD NUMBER:  [id number]    The patient is being assessed for  Admission    I agree that at least one RN has performed a thorough Head to Toe Skin Assessment on the patient. ALL assessment sites listed below have been assessed.      Areas assessed by both nurses:    Head, Face, Ears, Shoulders, Back, Chest, Arms, Elbows, Hands, Sacrum. Buttock, Coccyx, Ischium, Legs. Feet and Heels, and Under Medical Devices abrasion rt post head and rt knee        Does the Patient have a Wound? No noted wound(s)       Caio Prevention initiated by RN: Yes  Wound Care Orders initiated by RN: No    Pressure Injury (Stage 3,4, Unstageable, DTI, NWPT, and Complex wounds) if present, place Wound referral order by RN under : No    New Ostomies, if present place, Ostomy referral order under : No     Nurse 1 eSignature: Electronically signed by Jerri Chou RN on 7/22/24 at 7:08 PM EDT    **SHARE this note so that the co-signing nurse can place an eSignature**    Nurse 2 eSignature: Electronically signed by Sideny Mojica RN on 7/23/24 at 4:03 AM EDT

## 2024-07-22 NOTE — ED NOTES
Attempted to perform orthostatic vitals, pt unable to tolerate at this time     Physician made aware

## 2024-07-22 NOTE — ED PROVIDER NOTES
[facility] 4 MED SURG TELE  EMERGENCY DEPARTMENT ENCOUNTER        Pt Name: Zuri Boggs  MRN: [id number]  Birthdate 1960  Date of evaluation: 7/22/2024  Provider: Padilla Alfaro MD  PCP: Winter Sarkar MD  Note Started: 11:23 AM EDT 7/22/24    CHIEF COMPLAINT       Chief Complaint   Patient presents with    Fall     Fell at [facility], lost consciousness and hit head. -thinners. Breast CA active chemo     Shortness of Breath       HISTORY OF PRESENT ILLNESS: 1 or more Elements   History From: Patient    Limitations to history : None    Zuri Boggs is a 63 y.o. female with past medical history of gallstone ileus, hypophosphatemia, breast cancer currently undergoing chemotherapy, diabetes mellitus, hyperlipidemia, hypothyroidism who presents with complaints of syncopal episode that occurred at [facility] today.  Patient states she was seen at [facility] today and lost consciousness and fell striking her head on the wall at that time.  Patient does complain of a slight headache post fall.  Patient that she has been having generalized weakness and shortness of breath for the past several days.  Patient states she is currently undergoing chemotherapy for breast cancer.  Patient denies any anticoagulation at this time.  Patient does note shortness of breath but denies any chest pain, blurry vision, numbness or tingling in extremities, dizziness or lightheadedness at this time, abdominal pain, nausea, vomit, diarrhea, fevers or chills, dysuria or hematuria.  Patient denies any tobacco, EtOH, illicit drug use.  Of note, patient states her tetanus shot is not up-to-date at this time.    Nursing Notes were all reviewed and agreed with or any disagreements were addressed in the HPI.    ROS:   Pertinent positives and negatives are stated within HPI, all other systems reviewed and are negative.    --------------------------------------------- PAST HISTORY ---------------------------------------------  Past

## 2024-07-23 PROBLEM — E44.0 MODERATE PROTEIN-CALORIE MALNUTRITION (HCC): Chronic | Status: ACTIVE | Noted: 2024-07-23

## 2024-07-23 LAB
ANION GAP SERPL CALCULATED.3IONS-SCNC: 11 MMOL/L (ref 7–16)
BASOPHILS # BLD: 0.01 K/UL (ref 0–0.2)
BASOPHILS NFR BLD: 0 % (ref 0–2)
BUN SERPL-MCNC: 28 MG/DL (ref 6–23)
CALCIUM SERPL-MCNC: 8.5 MG/DL (ref 8.6–10.2)
CHLORIDE SERPL-SCNC: 113 MMOL/L (ref 98–107)
CO2 SERPL-SCNC: 14 MMOL/L (ref 22–29)
CREAT SERPL-MCNC: 1.7 MG/DL (ref 0.5–1)
EKG ATRIAL RATE: 108 BPM
EKG P AXIS: 82 DEGREES
EKG P-R INTERVAL: 134 MS
EKG Q-T INTERVAL: 360 MS
EKG QRS DURATION: 82 MS
EKG QTC CALCULATION (BAZETT): 482 MS
EKG R AXIS: 3 DEGREES
EKG T AXIS: 70 DEGREES
EKG VENTRICULAR RATE: 108 BPM
EOSINOPHIL # BLD: 0 K/UL (ref 0.05–0.5)
EOSINOPHILS RELATIVE PERCENT: 0 % (ref 0–6)
ERYTHROCYTE [DISTWIDTH] IN BLOOD BY AUTOMATED COUNT: 20.4 % (ref 11.5–15)
GFR, ESTIMATED: 33 ML/MIN/1.73M2
GLUCOSE SERPL-MCNC: 115 MG/DL (ref 74–99)
HCT VFR BLD AUTO: 21.8 % (ref 34–48)
HCT VFR BLD AUTO: 25.2 % (ref 34–48)
HGB BLD-MCNC: 6.7 G/DL (ref 11.5–15.5)
HGB BLD-MCNC: 7.9 G/DL (ref 11.5–15.5)
IMM GRANULOCYTES # BLD AUTO: 0.43 K/UL (ref 0–0.58)
IMM GRANULOCYTES NFR BLD: 4 % (ref 0–5)
LYMPHOCYTES NFR BLD: 0.68 K/UL (ref 1.5–4)
LYMPHOCYTES RELATIVE PERCENT: 6 % (ref 20–42)
MAGNESIUM SERPL-MCNC: 2.3 MG/DL (ref 1.6–2.6)
MCH RBC QN AUTO: 25.1 PG (ref 26–35)
MCHC RBC AUTO-ENTMCNC: 30.7 G/DL (ref 32–34.5)
MCV RBC AUTO: 81.6 FL (ref 80–99.9)
MONOCYTES NFR BLD: 0.95 K/UL (ref 0.1–0.95)
MONOCYTES NFR BLD: 9 % (ref 2–12)
NEUTROPHILS NFR BLD: 81 % (ref 43–80)
NEUTS SEG NFR BLD: 8.96 K/UL (ref 1.8–7.3)
PHOSPHATE SERPL-MCNC: 2.9 MG/DL (ref 2.5–4.5)
PLATELET # BLD AUTO: 94 K/UL (ref 130–450)
PLATELET CONFIRMATION: NORMAL
PMV BLD AUTO: 10.8 FL (ref 7–12)
POTASSIUM SERPL-SCNC: 3.5 MMOL/L (ref 3.5–5)
RBC # BLD AUTO: 2.67 M/UL (ref 3.5–5.5)
RBC # BLD: ABNORMAL 10*6/UL
SODIUM SERPL-SCNC: 138 MMOL/L (ref 132–146)
WBC OTHER # BLD: 11 K/UL (ref 4.5–11.5)

## 2024-07-23 PROCEDURE — G0378 HOSPITAL OBSERVATION PER HR: HCPCS

## 2024-07-23 PROCEDURE — 96376 TX/PRO/DX INJ SAME DRUG ADON: CPT

## 2024-07-23 PROCEDURE — 2500000003 HC RX 250 WO HCPCS: Performed by: INTERNAL MEDICINE

## 2024-07-23 PROCEDURE — 1200000000 HC SEMI PRIVATE

## 2024-07-23 PROCEDURE — 86850 RBC ANTIBODY SCREEN: CPT

## 2024-07-23 PROCEDURE — 84100 ASSAY OF PHOSPHORUS: CPT

## 2024-07-23 PROCEDURE — 30233N1 TRANSFUSION OF NONAUTOLOGOUS RED BLOOD CELLS INTO PERIPHERAL VEIN, PERCUTANEOUS APPROACH: ICD-10-PCS | Performed by: INTERNAL MEDICINE

## 2024-07-23 PROCEDURE — 86901 BLOOD TYPING SEROLOGIC RH(D): CPT

## 2024-07-23 PROCEDURE — 93010 ELECTROCARDIOGRAM REPORT: CPT | Performed by: INTERNAL MEDICINE

## 2024-07-23 PROCEDURE — 2580000003 HC RX 258: Performed by: INTERNAL MEDICINE

## 2024-07-23 PROCEDURE — 83735 ASSAY OF MAGNESIUM: CPT

## 2024-07-23 PROCEDURE — 36415 COLL VENOUS BLD VENIPUNCTURE: CPT

## 2024-07-23 PROCEDURE — 85025 COMPLETE CBC W/AUTO DIFF WBC: CPT

## 2024-07-23 PROCEDURE — 86923 COMPATIBILITY TEST ELECTRIC: CPT

## 2024-07-23 PROCEDURE — 85014 HEMATOCRIT: CPT

## 2024-07-23 PROCEDURE — 85018 HEMOGLOBIN: CPT

## 2024-07-23 PROCEDURE — 6360000002 HC RX W HCPCS: Performed by: INTERNAL MEDICINE

## 2024-07-23 PROCEDURE — P9016 RBC LEUKOCYTES REDUCED: HCPCS

## 2024-07-23 PROCEDURE — 82270 OCCULT BLOOD FECES: CPT

## 2024-07-23 PROCEDURE — 87449 NOS EACH ORGANISM AG IA: CPT

## 2024-07-23 PROCEDURE — 86900 BLOOD TYPING SEROLOGIC ABO: CPT

## 2024-07-23 PROCEDURE — 6370000000 HC RX 637 (ALT 250 FOR IP): Performed by: INTERNAL MEDICINE

## 2024-07-23 PROCEDURE — 80048 BASIC METABOLIC PNL TOTAL CA: CPT

## 2024-07-23 PROCEDURE — 36430 TRANSFUSION BLD/BLD COMPNT: CPT

## 2024-07-23 PROCEDURE — 99233 SBSQ HOSP IP/OBS HIGH 50: CPT | Performed by: INTERNAL MEDICINE

## 2024-07-23 PROCEDURE — 87324 CLOSTRIDIUM AG IA: CPT

## 2024-07-23 RX ORDER — DIPHENHYDRAMINE HCL 25 MG
50 TABLET ORAL ONCE
Status: COMPLETED | OUTPATIENT
Start: 2024-07-23 | End: 2024-07-23

## 2024-07-23 RX ORDER — SODIUM CHLORIDE 9 MG/ML
INJECTION, SOLUTION INTRAVENOUS PRN
Status: DISCONTINUED | OUTPATIENT
Start: 2024-07-23 | End: 2024-07-25 | Stop reason: HOSPADM

## 2024-07-23 RX ORDER — PANTOPRAZOLE SODIUM 40 MG/1
40 TABLET, DELAYED RELEASE ORAL
Status: DISCONTINUED | OUTPATIENT
Start: 2024-07-24 | End: 2024-07-25 | Stop reason: HOSPADM

## 2024-07-23 RX ORDER — ACETAMINOPHEN 325 MG/1
650 TABLET ORAL ONCE
Status: COMPLETED | OUTPATIENT
Start: 2024-07-23 | End: 2024-07-23

## 2024-07-23 RX ADMIN — ATORVASTATIN CALCIUM 20 MG: 20 TABLET, FILM COATED ORAL at 09:51

## 2024-07-23 RX ADMIN — ACETAMINOPHEN 650 MG: 325 TABLET ORAL at 12:45

## 2024-07-23 RX ADMIN — PROCHLORPERAZINE MALEATE 10 MG: 5 TABLET ORAL at 09:51

## 2024-07-23 RX ADMIN — PROCHLORPERAZINE MALEATE 10 MG: 5 TABLET ORAL at 02:45

## 2024-07-23 RX ADMIN — DIPHENHYDRAMINE HCL 50 MG: 25 TABLET ORAL at 12:45

## 2024-07-23 RX ADMIN — LEVOTHYROXINE SODIUM 25 MCG: 25 TABLET ORAL at 06:12

## 2024-07-23 RX ADMIN — POTASSIUM CHLORIDE: 2 INJECTION, SOLUTION, CONCENTRATE INTRAVENOUS at 16:39

## 2024-07-23 RX ADMIN — POTASSIUM CHLORIDE AND SODIUM CHLORIDE: 900; 300 INJECTION, SOLUTION INTRAVENOUS at 06:15

## 2024-07-23 NOTE — PROGRESS NOTES
Comprehensive Nutrition Assessment    Type and Reason for Visit:  Initial, Positive Nutrition Screen (MST 3)    Nutrition Recommendations/Plan:   Continue Current Diet  Continue ONS (diabetic) TID  Consult RD as needed      Malnutrition Assessment:  Malnutrition Status:  Moderate malnutrition (07/23/24 1543)    Context:  Chronic Illness     Findings of the 6 clinical characteristics of malnutrition:  Energy Intake:  75% or less estimated energy requirements for 1 month or longer  Weight Loss:  Greater than 7.5% over 3 months (-10.8% in three months)     Body Fat Loss:   (moderate) Orbital   Muscle Mass Loss:   (moderate) Temples (temporalis), Clavicles (pectoralis & deltoids)  Fluid Accumulation:  No significant fluid accumulation     Strength:  Not Performed    Nutrition Assessment:    Pt admit w/ JACKIE, hypokalemia. Pt has breast cancer and undergoing chemo. PMHx: DM, HLD, Thyroid Disease. Pt reports fair appetite w/ po intake around 50% of all meals, complains of altered taste 2/2 chemo. ONS ordered per MD, continue. F/up per policy. Pt meets criteria for moderate malnutrition.    Nutrition Related Findings:    A/O x4, I/O not recorded, Chloride 113, Co2 14, K+ 3.5, elevated BUN/Creatinine, GFR 33, Ca+ 8.5, Troponin 14, ALK PHOS 179, RBC 2.67, Hgb 6.7, Hct 21.8 Wound Type: None       Current Nutrition Intake & Therapies:    Average Meal Intake: 26-50%  Average Supplements Intake: 26-50%  ADULT ORAL NUTRITION SUPPLEMENT; Breakfast, Lunch, Dinner; Diabetic Oral Supplement  ADULT DIET; Regular; 5 carb choices (75 gm/meal)    Anthropometric Measures:  Height: 152.4 cm (5')  Ideal Body Weight (IBW): 100 lbs (45 kg)    Admission Body Weight: 66.7 kg (147 lb 0.8 oz)  Current Body Weight: 66.7 kg (147 lb 0.8 oz), 147 % IBW. Weight Source: Stated (07/22/24)  Current BMI (kg/m2): 28.7  Usual Body Weight: 74.8 kg (164 lb 14.5 oz) (04/23/24)  % Weight Change (Calculated): -10.8  Weight Adjustment For: No Adjustment

## 2024-07-23 NOTE — H&P
[facility]   History and Physical      CHIEF COMPLAINT:  Syncope, head injury after fall at [facility].    History of Present Illness:  63 y.o. female with history of invasive ductal carcinoma of the left breast who is undergoing chemotherapy prior to surgery.  She completed her 3/5 cycles of chemotherapy 2 weeks ago.  She was at the [facility] today when she had a syncopal episode fell and hit her head and was brought to the emergency room via EMS.  CT of the head was without any acute abnormality.  Because she presented with syncope they pursued a CTA of the chest to rule out PE that was unremarkable for that.  Rapid influenza and COVID were negative.  She was found to have acute hypokalemia with potassium of 3.2 as well as magnesium 1.2.  She also had JACKIE with creatinine of 2.0.  She was given 2 L of fluid in the ER with improvement creatinine 1.7 potassium dropped further to 2.8 and magnesium 1.3.  She was felt appropriate for inpatient admission and was sent to a monitored floor.    Informant(s) for H&P: Patient, son, and EMR    REVIEW OF SYSTEMS:  As per HPI, otherwise negative      PMH:  Past Medical History:   Diagnosis Date    Breast cancer (HCC)     Found out May 2024, Dr. Preston    Diabetes mellitus (HCC)     Hyperlipidemia     Thyroid disease        Surgical History:  Past Surgical History:   Procedure Laterality Date    BREAST BIOPSY      COLECTOMY N/A 04/24/2024    EXPLORATORY LAPAROSCOPY SMALL  BOWEL RESECTION performed by Howie Arce MD at [facility] OR    COLONOSCOPY      12/2023 Dr. Orta    ERCP      removed gallstone    SOHA NEEDLE BIOPSY LYMPH NODE SUPERFICIAL  5/16/2024    SOHA NEEDLE BIOPSY LYMPH NODE SUPERFICIAL 5/16/2024 [facility]    SOHA US PERQ DEVICE SOFT TISSUE PLMT  FIRST LESION  5/16/2024    SOHA US PERQ DEVICE SOFT TISSUE PLMT  FIRST LESION 5/16/2024 [facility]    PORT SURGERY Left 5/17/2024    MEDIPORT INSERTION performed by Claude

## 2024-07-23 NOTE — PROGRESS NOTES
Physical Therapy    Physical Therapy    Room #:  0438/0438-01  Patient Name: Zuri Boggs  YOB: 1960  MRN: [id number]      Date of Service: 7/23/2024    Chart reviewed. Spoke with nursing. Patient  observed to be independent with bed mobility, transfers and gait.  No skilled Physical Therapy needs at this time. Please re-order Physical Therapy if there is a change in physical status and Physical Therapy is needed. Thank you.     Radha Da Silva, SPT

## 2024-07-23 NOTE — ACP (ADVANCE CARE PLANNING)
Advance Care Planning   Healthcare Decision Maker:    Primary Decision Maker: Juliano Boggs - Spouse - [phone]    Secondary Decision Maker: Kenroy Boggs - Child - [phone]      Today we documented Decision Maker(s) consistent with Legal Next of Kin hierarchy.

## 2024-07-23 NOTE — CONSENT
Informed Consent for Blood Component Transfusion Note    I have discussed with the patient the rationale for blood component transfusion; its benefits in treating or preventing fatigue, organ damage, or death; and its risk which includes mild transfusion reactions, rare risk of blood borne infection, or more serious but rare reactions. I have discussed the alternatives to transfusion, including the risk and consequences of not receiving transfusion. The patient had an opportunity to ask questions and had agreed to proceed with transfusion of blood components.    Electronically signed by Rafa Lockwood MD on 7/23/24 at 11:17 AM EDT

## 2024-07-23 NOTE — PROGRESS NOTES
Renal Dose Adjustment Policy (Generic)     This patient is on medication that requires renal, weight, and/or indication dose adjustment.      Date Body Weight IBW  Adjusted BW SCr  CrCl Dialysis status   7/22/2024 66.7 kg (147 lb) Ideal body weight: 45.5 kg (100 lb 4.9 oz)  Adjusted ideal body weight: 54 kg (118 lb 15.8 oz) Serum creatinine: 1.7 mg/dL (H) 07/22/24 1618  Estimated creatinine clearance: 29 mL/min (A) N/a       Pharmacy has dose-adjusted the following medication(s):    Date Previous Order Adjusted Order   7/22/2024 Ciprofloxacin 500 mg every 12 hours Ciprofloxacin 500 mg every 24 hours       These changes were made per protocol according to the [facility]   Automatic Renal Dose Adjustment Policy.     *Please note this dose may need readjusted if patient's condition changes.    Please contact pharmacy with any questions regarding these changes.    Nell Quiroz, PharmD 7/22/2024 8:09 PM  SJW: [phone]

## 2024-07-23 NOTE — PROGRESS NOTES
S:Patient states she has been having watery diarrhea multiple times a day started before chemotherapy when she had a gallstone issue found but has only worsened on her chemotherapy. She states stool is greenish and watery and denies bleeding. She does feel left breast mass is shrinking.    O: Temp 97.7 P-92 R-17 $9  Gen- alert, talking, no acute distress, pallor  HEENT- mmm, no oral lesions  Lungs- clear  Cardiac- regular  Abd- soft/nt/nd +BS  Left breast mass upper outer quadrant, patient states it is much smaller than prior to starting chemotherapy, mediport chest wall site clean  Ext- warm and well perfused    Labs wbc=11 Hb=6.7 plt=94,000 creatinine 1.1, potassium 3.5  Bloo cultures 7-22-24 negative      Meds:    Synthroid, lipitor, compazine, B12,oral cipro started 7-18-24 outpatient    A/P:  63 year old female known to Dr Khan with LN + ER- [id number] + upper outer quadrant left breast cancer s/p 3 cycles of neoadjuvant carbo-taxotere-herceptin-perjeta with GCSF support last given 7-11-24 admitted with dehydration,c ytopenias, renal failure. Her left breast mass is improving.   Patient states she has had some chronic diarrhea since April 2024 when found to have gallstones but diarrhea significantly worsened on chemotherapy with multiple watery stools a day.She has anemia and thrombocytopenia from chemotherapy and ARF from pre renal dehydration and hypokalemia    Stop cipro, it was started 7-18-24 outpatient prophylactic neutropenic precautions when WBC was 1,000 and ANC 98. WBC recovered after GCSF.  C.Diff stool given multiple watery stools started before admission. If negative, can have imodium  Anemia at least in part from chemotherapy. Transfusion ordered. Occult stools ordered and start protonix while pending.  CBC with diff daily , bmp, supportive care  Thrombocytopenia due to chemotherapy    Sangeeta Paris MD

## 2024-07-23 NOTE — CARE COORDINATION
Case Management Assessment  Initial Evaluation    Date/Time of Evaluation: 7/23/2024 11:08 AM  Assessment Completed by: RUFINO Jarvis    If patient is discharged prior to next notation, then this note serves as note for discharge by case management.    Patient Name: Zuri Boggs                   YOB: 1960  Diagnosis: Hypocalcemia [E83.51]  Syncope and collapse [R55]  Hypokalemia [E87.6]  Hypomagnesemia [E83.42]  Acute renal insufficiency [N28.9]  General weakness [R53.1]  JACKIE (acute kidney injury) (HCC) [N17.9]                   Date / Time: 7/22/2024 11:17 AM    Patient Admission Status: Observation   Readmission Risk (Low < 19, Mod (19-27), High > 27): Readmission Risk Score: 10.4    Current PCP: Winter Sarkar MD  PCP verified by CM? Yes    Chart Reviewed: Yes      History Provided by: Patient  Patient Orientation: Alert and Oriented    Patient Cognition: Alert    Hospitalization in the last 30 days (Readmission):  No    If yes, Readmission Assessment in CM Navigator will be completed.    Advance Directives:      Code Status: Full Code   Patient's Primary Decision Maker is: Legal Next of Kin    Primary Decision Maker: Juliano Boggs - Spouse - [phone]    Secondary Decision Maker: Kenroy Boggs - Child - [phone]    Discharge Planning:    Patient lives with: Spouse/Significant Other Type of Home: House  Primary Care Giver: Self  Patient Support Systems include: Spouse/Significant Other, Children   Current Financial resources:    Current community resources:    Current services prior to admission: None            Current DME:              Type of Home Care services:  None    ADLS  Prior functional level: Independent in ADLs/IADLs  Current functional level: Other (see comment) (TBD- therapy consulted)    PT AM-PAC:   /24  OT AM-PAC:   /24    Family can provide assistance at DC: Yes  Would you like Case Management to discuss the discharge plan with any other family

## 2024-07-24 LAB
ANION GAP SERPL CALCULATED.3IONS-SCNC: 13 MMOL/L (ref 7–16)
BASOPHILS # BLD: 0.02 K/UL (ref 0–0.2)
BASOPHILS NFR BLD: 0 % (ref 0–2)
BUN SERPL-MCNC: 17 MG/DL (ref 6–23)
C DIFF GDH + TOXINS A+B STL QL IA.RAPID: NEGATIVE
CALCIUM SERPL-MCNC: 8 MG/DL (ref 8.6–10.2)
CHLORIDE SERPL-SCNC: 111 MMOL/L (ref 98–107)
CO2 SERPL-SCNC: 16 MMOL/L (ref 22–29)
CREAT SERPL-MCNC: 1.3 MG/DL (ref 0.5–1)
DATE, STOOL #1: NORMAL
EOSINOPHIL # BLD: 0 K/UL (ref 0.05–0.5)
EOSINOPHILS RELATIVE PERCENT: 0 % (ref 0–6)
ERYTHROCYTE [DISTWIDTH] IN BLOOD BY AUTOMATED COUNT: 19.8 % (ref 11.5–15)
ERYTHROCYTE [SEDIMENTATION RATE] IN BLOOD BY WESTERGREN METHOD: 18 MM/HR (ref 0–20)
FERRITIN SERPL-MCNC: 1011 NG/ML
FOLATE SERPL-MCNC: 4.5 NG/ML (ref 4.8–24.2)
GFR, ESTIMATED: 47 ML/MIN/1.73M2
GLUCOSE SERPL-MCNC: 134 MG/DL (ref 74–99)
HBA1C MFR BLD: 6.5 % (ref 4–5.6)
HCT VFR BLD AUTO: 21.7 % (ref 34–48)
HEMOCCULT SP1 STL QL: NEGATIVE
HGB BLD-MCNC: 7.3 G/DL (ref 11.5–15.5)
IMM GRANULOCYTES # BLD AUTO: 0.64 K/UL (ref 0–0.58)
IMM GRANULOCYTES NFR BLD: 6 % (ref 0–5)
IRON SATN MFR SERPL: ABNORMAL % (ref 15–50)
IRON SERPL-MCNC: 171 UG/DL (ref 37–145)
LYMPHOCYTES NFR BLD: 0.71 K/UL (ref 1.5–4)
LYMPHOCYTES RELATIVE PERCENT: 7 % (ref 20–42)
MAGNESIUM SERPL-MCNC: 1.7 MG/DL (ref 1.6–2.6)
MCH RBC QN AUTO: 27 PG (ref 26–35)
MCHC RBC AUTO-ENTMCNC: 33.6 G/DL (ref 32–34.5)
MCV RBC AUTO: 80.4 FL (ref 80–99.9)
MONOCYTES NFR BLD: 0.7 K/UL (ref 0.1–0.95)
MONOCYTES NFR BLD: 7 % (ref 2–12)
NEUTROPHILS NFR BLD: 81 % (ref 43–80)
NEUTS SEG NFR BLD: 8.69 K/UL (ref 1.8–7.3)
PLATELET # BLD AUTO: 70 K/UL (ref 130–450)
PLATELET CONFIRMATION: NORMAL
PMV BLD AUTO: 9.6 FL (ref 7–12)
POTASSIUM SERPL-SCNC: 3.1 MMOL/L (ref 3.5–5)
RBC # BLD AUTO: 2.7 M/UL (ref 3.5–5.5)
SODIUM SERPL-SCNC: 140 MMOL/L (ref 132–146)
SPECIMEN DESCRIPTION: NORMAL
TIBC SERPL-MCNC: ABNORMAL UG/DL (ref 250–450)
TIME, STOOL #1: 2315
VIT B12 SERPL-MCNC: >2000 PG/ML (ref 211–946)
WBC OTHER # BLD: 10.8 K/UL (ref 4.5–11.5)

## 2024-07-24 PROCEDURE — 6370000000 HC RX 637 (ALT 250 FOR IP): Performed by: INTERNAL MEDICINE

## 2024-07-24 PROCEDURE — 80048 BASIC METABOLIC PNL TOTAL CA: CPT

## 2024-07-24 PROCEDURE — 82607 VITAMIN B-12: CPT

## 2024-07-24 PROCEDURE — 36415 COLL VENOUS BLD VENIPUNCTURE: CPT

## 2024-07-24 PROCEDURE — 82728 ASSAY OF FERRITIN: CPT

## 2024-07-24 PROCEDURE — 85652 RBC SED RATE AUTOMATED: CPT

## 2024-07-24 PROCEDURE — 1200000000 HC SEMI PRIVATE

## 2024-07-24 PROCEDURE — 2580000003 HC RX 258: Performed by: INTERNAL MEDICINE

## 2024-07-24 PROCEDURE — 83036 HEMOGLOBIN GLYCOSYLATED A1C: CPT

## 2024-07-24 PROCEDURE — 97165 OT EVAL LOW COMPLEX 30 MIN: CPT

## 2024-07-24 PROCEDURE — 83735 ASSAY OF MAGNESIUM: CPT

## 2024-07-24 PROCEDURE — 83550 IRON BINDING TEST: CPT

## 2024-07-24 PROCEDURE — 2500000003 HC RX 250 WO HCPCS: Performed by: INTERNAL MEDICINE

## 2024-07-24 PROCEDURE — 83540 ASSAY OF IRON: CPT

## 2024-07-24 PROCEDURE — 99232 SBSQ HOSP IP/OBS MODERATE 35: CPT | Performed by: INTERNAL MEDICINE

## 2024-07-24 PROCEDURE — 82746 ASSAY OF FOLIC ACID SERUM: CPT

## 2024-07-24 PROCEDURE — 85025 COMPLETE CBC W/AUTO DIFF WBC: CPT

## 2024-07-24 PROCEDURE — 6360000002 HC RX W HCPCS: Performed by: INTERNAL MEDICINE

## 2024-07-24 RX ORDER — FOLIC ACID 1 MG/1
1 TABLET ORAL DAILY
Status: DISCONTINUED | OUTPATIENT
Start: 2024-07-24 | End: 2024-07-25 | Stop reason: HOSPADM

## 2024-07-24 RX ORDER — MAGNESIUM SULFATE IN WATER 40 MG/ML
2000 INJECTION, SOLUTION INTRAVENOUS ONCE
Status: COMPLETED | OUTPATIENT
Start: 2024-07-24 | End: 2024-07-24

## 2024-07-24 RX ADMIN — SODIUM BICARBONATE: 84 INJECTION INTRAVENOUS at 13:58

## 2024-07-24 RX ADMIN — ATORVASTATIN CALCIUM 20 MG: 20 TABLET, FILM COATED ORAL at 11:08

## 2024-07-24 RX ADMIN — PANTOPRAZOLE SODIUM 40 MG: 40 TABLET, DELAYED RELEASE ORAL at 06:07

## 2024-07-24 RX ADMIN — PROCHLORPERAZINE MALEATE 10 MG: 5 TABLET ORAL at 11:08

## 2024-07-24 RX ADMIN — MAGNESIUM SULFATE HEPTAHYDRATE 2000 MG: 40 INJECTION, SOLUTION INTRAVENOUS at 11:15

## 2024-07-24 RX ADMIN — CYANOCOBALAMIN TAB 1000 MCG 1000 MCG: 1000 TAB at 11:08

## 2024-07-24 RX ADMIN — POTASSIUM CHLORIDE: 2 INJECTION, SOLUTION, CONCENTRATE INTRAVENOUS at 03:49

## 2024-07-24 RX ADMIN — POTASSIUM BICARBONATE 50 MEQ: 978 TABLET, EFFERVESCENT ORAL at 11:10

## 2024-07-24 RX ADMIN — SODIUM BICARBONATE: 84 INJECTION INTRAVENOUS at 11:16

## 2024-07-24 RX ADMIN — CYANOCOBALAMIN TAB 1000 MCG 1000 MCG: 1000 TAB at 16:56

## 2024-07-24 RX ADMIN — FOLIC ACID 1 MG: 1 TABLET ORAL at 14:00

## 2024-07-24 RX ADMIN — LEVOTHYROXINE SODIUM 25 MCG: 25 TABLET ORAL at 06:07

## 2024-07-24 NOTE — PROGRESS NOTES
Occupational Therapy  OCCUPATIONAL THERAPY INITIAL EVALUATION    [facility]                                               [address] [facility]      Date:2024                                                Patient Name: Zuri Boggs  MRN: [id number]  : 1960  Room: [address] [place] [address]    Evaluating OT:Kiara Hernandez OTR/L #[id number]    Referring Provider: Rafa Lockwood MD   Specific Provider Orders/Date: OT eval and treat 24   Placement Recommendation: home with spouse     Diagnosis: Hypocalcemia [E83.51]  Syncope and collapse [R55]  Hypokalemia [E87.6]  Hypomagnesemia [E83.42]  Acute renal insufficiency [N28.9]  General weakness [R53.1]  JACKIE (acute kidney injury) (HCC) [N17.9]   Pt admitted to hospital on 24 for fall, +syncope, +hit head    Pertinent Medical History:  has a past medical history of Breast cancer (HCC), Diabetes mellitus (HCC), Hyperlipidemia, and Thyroid disease.       Precautions:  contact isolation (C.Diff), breast CA-current chemo    Assessment of current deficits    [] Functional mobility  []ADLs  [] Strength               []Cognition    [] Functional transfers   [] IADLs         [] Safety Awareness   []Endurance    [] Fine Coordination              [] Balance      [] Vision/perception   []Sensation     []Gross Motor Coordination  [] ROM  [] Delirium                   [] Motor Control     OT PLAN OF CARE   OT POC based on physician orders, patient diagnosis and results of clinical assessment    Frequency/Duration n/a    Recommended Adaptive Equipment: none    Home Living: Pt lives with spouse in 1 floor home.    Bathroom setup: tub/shower    Equipment owned: none    Prior Level of Function: independent with ADLs , shares IADLs; ambulated w/o AD   Driving:yes    Pain Level: Pt c/o no pain this session    Cognition: A&O: 4/4; Follows 1-2 step directions   Memory:  good    Sequencing:

## 2024-07-24 NOTE — PROGRESS NOTES
[facility] Hospitalist   Progress Note (late entry)    Admitting Date and Time: 7/22/2024 11:17 AM  Admit Dx: Hypocalcemia [E83.51]  Syncope and collapse [R55]  Hypokalemia [E87.6]  Hypomagnesemia [E83.42]  Acute renal insufficiency [N28.9]  General weakness [R53.1]  JACKIE (acute kidney injury) (HCC) [N17.9]    Subjective:    7/23: Pt feels better today. She was receiving unit of PRBCs when I saw her this afternoon.  Per RN, patient received blood transfusion and has now issues. She was premedicated     7/24: Pt sitting up in bed and in good spirits. Her son just brought her some Greek donuts from the festival.    Per RN: stool for C diff came back negative.     folic acid  1 mg Oral Daily    pantoprazole  40 mg Oral QAM AC    atorvastatin  20 mg Oral Daily    levothyroxine  25 mcg Oral Daily    vitamin B-12  1,000 mcg Oral Once per day on Mon Wed Fri     sodium chloride, , PRN  loperamide, 4 mg, 4x Daily PRN  prochlorperazine, 10 mg, Q6H PRN         Objective:    BP (!) 99/54   Pulse 89   Temp 98.6 °F (37 °C) (Oral)   Resp 18   Ht 1.524 m (5')   Wt 66.7 kg (147 lb)   SpO2 99%   BMI 28.71 kg/m²   General Appearance: alert and oriented to person, place and time and in no acute distress  Skin: warm and dry  Head: normocephalic and atraumatic  Eyes: pupils equal, round, and reactive to light, extraocular eye movements intact, conjunctivae normal  Neck: neck supple and non tender without mass   Pulmonary/Chest: clear to auscultation bilaterally- no wheezes, rales or rhonchi, normal air movement, no respiratory distress  Cardiovascular: normal rate, normal S1 and S2 and no carotid bruits  Abdomen: soft, non-tender, non-distended, normal bowel sounds, no masses or organomegaly  Extremities: no cyanosis, no clubbing and no edema  Neurologic: no cranial nerve deficit and speech normal      Recent Labs     07/22/24  1618 07/23/24  0713 07/24/24  0736    138 140   K 2.8* 3.5 3.1*   * 113*

## 2024-07-24 NOTE — PLAN OF CARE
Problem: Safety - Adult  Goal: Free from fall injury  Outcome: Progressing  Flowsheets (Taken 7/24/2024 0017)  Free From Fall Injury: Instruct family/caregiver on patient safety     Problem: Skin/Tissue Integrity  Goal: Absence of new skin breakdown  Description: 1.  Monitor for areas of redness and/or skin breakdown  2.  Assess vascular access sites hourly  3.  Every 4-6 hours minimum:  Change oxygen saturation probe site  4.  Every 4-6 hours:  If on nasal continuous positive airway pressure, respiratory therapy assess nares and determine need for appliance change or resting period.  Outcome: Progressing     Problem: Nutrition Deficit:  Goal: Optimize nutritional status  7/24/2024 0020 by MACKENZIE Woodson RN  Outcome: Progressing  7/23/2024 1546 by Jacquelin Bolaños RD, LD  Outcome: Progressing  Flowsheets (Taken 7/23/2024 1546)  Nutrient intake appropriate for improving, restoring, or maintaining nutritional needs:   Assess nutritional status and recommend course of action   Monitor oral intake, labs, and treatment plans   Recommend appropriate diets, oral nutritional supplements, and vitamin/mineral supplements   Order, calculate, and assess calorie counts as needed

## 2024-07-24 NOTE — PROGRESS NOTES
[facility] Hospitalist   Progress Note (late entry)    Admitting Date and Time: 7/22/2024 11:17 AM  Admit Dx: Hypocalcemia [E83.51]  Syncope and collapse [R55]  Hypokalemia [E87.6]  Hypomagnesemia [E83.42]  Acute renal insufficiency [N28.9]  General weakness [R53.1]  JACKIE (acute kidney injury) (HCC) [N17.9]    Subjective:    Pt feels better today. She was receiving unit of PRBCs when I saw her this afternoon.    Per RN: patient received blood transfusion and has now issues. She was premedicated.     pantoprazole  40 mg Oral QAM AC    atorvastatin  20 mg Oral Daily    levothyroxine  25 mcg Oral Daily    vitamin B-12  1,000 mcg Oral Once per day on Mon Wed Fri     sodium chloride, , PRN  loperamide, 4 mg, 4x Daily PRN  prochlorperazine, 10 mg, Q6H PRN         Objective:    BP (!) 99/59   Pulse 90   Temp 98.6 °F (37 °C)   Resp 17   Ht 1.524 m (5')   Wt 66.7 kg (147 lb)   SpO2 100%   BMI 28.71 kg/m²   General Appearance: alert and oriented to person, place and time and in no acute distress  Skin: warm and dry  Head: normocephalic and atraumatic  Eyes: pupils equal, round, and reactive to light, extraocular eye movements intact, conjunctivae normal  Neck: neck supple and non tender without mass   Pulmonary/Chest: clear to auscultation bilaterally- no wheezes, rales or rhonchi, normal air movement, no respiratory distress  Cardiovascular: normal rate, normal S1 and S2 and no carotid bruits  Abdomen: soft, non-tender, non-distended, normal bowel sounds, no masses or organomegaly  Extremities: no cyanosis, no clubbing and no edema  Neurologic: no cranial nerve deficit and speech normal      Recent Labs     07/22/24  1139 07/22/24  1618 07/23/24  0713    139 138   K 3.2* 2.8* 3.5   * 115* 113*   CO2 11* 10* 14*   BUN 36* 31* 28*   CREATININE 2.0* 1.7* 1.7*   GLUCOSE 130* 112* 115*   CALCIUM 8.8 7.5* 8.5*       Recent Labs     07/22/24  1139   ALKPHOS 179*   BILITOT 0.3   AST 12   ALT 14

## 2024-07-24 NOTE — PROGRESS NOTES
Result   No evidence of acute intracranial hemorrhage, edema, or mass.               Current Facility-Administered Medications:     0.9 % sodium chloride infusion, , IntraVENous, PRN, Rafa Lockwood MD    loperamide (IMODIUM) 1 MG/5ML solution 4 mg, 4 mg, Oral, 4x Daily PRN, Sangeeta Paris MD    pantoprazole (PROTONIX) tablet 40 mg, 40 mg, Oral, QAM AC, Sangeeta Paris MD, 40 mg at 07/24/24 0607    atorvastatin (LIPITOR) tablet 20 mg, 20 mg, Oral, Daily, Rafa Lockwood MD, 20 mg at 07/23/24 0951    levothyroxine (SYNTHROID) tablet 25 mcg, 25 mcg, Oral, Daily, Rafa Lockwood MD, 25 mcg at 07/24/24 0607    prochlorperazine (COMPAZINE) tablet 10 mg, 10 mg, Oral, Q6H PRN, Rafa Lockwood MD, 10 mg at 07/23/24 0951    vitamin B-12 (CYANOCOBALAMIN) tablet 1,000 mcg, 1,000 mcg, Oral, Once per day on Mon Wed Fri, Rafa Lockwood MD    Assessment:    Principal Problem:    JACKIE (acute kidney injury) (HCC)  Active Problems:    Malignant neoplasm of overlapping sites of left breast in female, estrogen receptor negative (HCC)    Hypokalemia    Hypomagnesemia    Syncope due to orthostatic hypotension    Invasive ductal carcinoma of breast, female, left (HCC)    Closed head injury    Syncope and collapse    Moderate protein-calorie malnutrition (HCC)  Resolved Problems:    * No resolved hospital problems. *      63-year-old woman with breast cancer receiving neoadjuvant chemotherapy with TCHP.  She received her third cycle on July 11, 2024.  Admitted with diarrhea, JACKIE, hypokalemia and hypomagnesemia.    Plan:  -Diarrhea is slowly improving.  Waiting for C. difficile study.  However clinically she seems to be improving slowly.  -Replete electrolytes per primary team.  - CBC with differential daily.  - Transfuse if hemoglobin less than 7 or platelets less than 10.  - For the anemia, will check ferritin, iron profile and sed rate today.  Likely chemotherapy related, however, she has a microcytic  anemia and will rule out iron deficiency.      Electronically signed by Deepali Mccann MD on 7/24/2024 at 10:09 AM

## 2024-07-25 VITALS
WEIGHT: 147 LBS | DIASTOLIC BLOOD PRESSURE: 55 MMHG | HEART RATE: 91 BPM | OXYGEN SATURATION: 100 % | TEMPERATURE: 98.4 F | HEIGHT: 60 IN | RESPIRATION RATE: 16 BRPM | SYSTOLIC BLOOD PRESSURE: 90 MMHG | BODY MASS INDEX: 28.86 KG/M2

## 2024-07-25 LAB
ANION GAP SERPL CALCULATED.3IONS-SCNC: 10 MMOL/L (ref 7–16)
BASOPHILS # BLD: 0.01 K/UL (ref 0–0.2)
BASOPHILS NFR BLD: 0 % (ref 0–2)
BUN SERPL-MCNC: 9 MG/DL (ref 6–23)
CALCIUM SERPL-MCNC: 8 MG/DL (ref 8.6–10.2)
CHLORIDE SERPL-SCNC: 106 MMOL/L (ref 98–107)
CO2 SERPL-SCNC: 26 MMOL/L (ref 22–29)
CREAT SERPL-MCNC: 1.1 MG/DL (ref 0.5–1)
EOSINOPHIL # BLD: 0 K/UL (ref 0.05–0.5)
EOSINOPHILS RELATIVE PERCENT: 0 % (ref 0–6)
ERYTHROCYTE [DISTWIDTH] IN BLOOD BY AUTOMATED COUNT: 19.9 % (ref 11.5–15)
GFR, ESTIMATED: 60 ML/MIN/1.73M2
GLUCOSE SERPL-MCNC: 116 MG/DL (ref 74–99)
HCT VFR BLD AUTO: 21.1 % (ref 34–48)
HCT VFR BLD AUTO: 24.8 % (ref 34–48)
HGB BLD-MCNC: 6.8 G/DL (ref 11.5–15.5)
HGB BLD-MCNC: 8.2 G/DL (ref 11.5–15.5)
IMM GRANULOCYTES # BLD AUTO: 0.25 K/UL (ref 0–0.58)
IMM GRANULOCYTES NFR BLD: 3 % (ref 0–5)
LYMPHOCYTES NFR BLD: 1 K/UL (ref 1.5–4)
LYMPHOCYTES RELATIVE PERCENT: 10 % (ref 20–42)
MAGNESIUM SERPL-MCNC: 1.7 MG/DL (ref 1.6–2.6)
MCH RBC QN AUTO: 25.8 PG (ref 26–35)
MCHC RBC AUTO-ENTMCNC: 32.2 G/DL (ref 32–34.5)
MCV RBC AUTO: 79.9 FL (ref 80–99.9)
MONOCYTES NFR BLD: 0.52 K/UL (ref 0.1–0.95)
MONOCYTES NFR BLD: 5 % (ref 2–12)
NEUTROPHILS NFR BLD: 82 % (ref 43–80)
NEUTS SEG NFR BLD: 7.79 K/UL (ref 1.8–7.3)
PLATELET # BLD AUTO: 67 K/UL (ref 130–450)
PLATELET CONFIRMATION: NORMAL
PMV BLD AUTO: 10.7 FL (ref 7–12)
POTASSIUM SERPL-SCNC: 3.7 MMOL/L (ref 3.5–5)
RBC # BLD AUTO: 2.64 M/UL (ref 3.5–5.5)
SODIUM SERPL-SCNC: 142 MMOL/L (ref 132–146)
WBC OTHER # BLD: 9.6 K/UL (ref 4.5–11.5)

## 2024-07-25 PROCEDURE — 99239 HOSP IP/OBS DSCHRG MGMT >30: CPT | Performed by: INTERNAL MEDICINE

## 2024-07-25 PROCEDURE — 85025 COMPLETE CBC W/AUTO DIFF WBC: CPT

## 2024-07-25 PROCEDURE — 6370000000 HC RX 637 (ALT 250 FOR IP): Performed by: INTERNAL MEDICINE

## 2024-07-25 PROCEDURE — 83735 ASSAY OF MAGNESIUM: CPT

## 2024-07-25 PROCEDURE — 85018 HEMOGLOBIN: CPT

## 2024-07-25 PROCEDURE — 85014 HEMATOCRIT: CPT

## 2024-07-25 PROCEDURE — 36415 COLL VENOUS BLD VENIPUNCTURE: CPT

## 2024-07-25 PROCEDURE — 2580000003 HC RX 258: Performed by: INTERNAL MEDICINE

## 2024-07-25 PROCEDURE — 6360000002 HC RX W HCPCS: Performed by: INTERNAL MEDICINE

## 2024-07-25 PROCEDURE — 36430 TRANSFUSION BLD/BLD COMPNT: CPT

## 2024-07-25 PROCEDURE — P9016 RBC LEUKOCYTES REDUCED: HCPCS

## 2024-07-25 PROCEDURE — 2500000003 HC RX 250 WO HCPCS: Performed by: INTERNAL MEDICINE

## 2024-07-25 PROCEDURE — 80048 BASIC METABOLIC PNL TOTAL CA: CPT

## 2024-07-25 RX ORDER — METFORMIN HYDROCHLORIDE 500 MG/1
500 TABLET, EXTENDED RELEASE ORAL
Qty: 60 TABLET | Refills: 0 | Status: SHIPPED | OUTPATIENT
Start: 2024-07-25

## 2024-07-25 RX ORDER — PANTOPRAZOLE SODIUM 40 MG/1
40 TABLET, DELAYED RELEASE ORAL
Qty: 30 TABLET | Refills: 0 | Status: SHIPPED | OUTPATIENT
Start: 2024-07-26

## 2024-07-25 RX ORDER — LOPERAMIDE HYDROCHLORIDE 2 MG/1
2 TABLET ORAL 4 TIMES DAILY PRN
Qty: 40 TABLET | Refills: 0 | COMMUNITY
Start: 2024-07-25 | End: 2024-08-04

## 2024-07-25 RX ORDER — FOLIC ACID 1 MG/1
1 TABLET ORAL DAILY
Qty: 30 TABLET | Refills: 0 | Status: SHIPPED | OUTPATIENT
Start: 2024-07-26

## 2024-07-25 RX ORDER — SODIUM CHLORIDE 9 MG/ML
INJECTION, SOLUTION INTRAVENOUS PRN
Status: DISCONTINUED | OUTPATIENT
Start: 2024-07-25 | End: 2024-07-25 | Stop reason: HOSPADM

## 2024-07-25 RX ADMIN — SODIUM BICARBONATE: 84 INJECTION INTRAVENOUS at 00:27

## 2024-07-25 RX ADMIN — LEVOTHYROXINE SODIUM 25 MCG: 25 TABLET ORAL at 06:02

## 2024-07-25 RX ADMIN — ATORVASTATIN CALCIUM 20 MG: 20 TABLET, FILM COATED ORAL at 09:43

## 2024-07-25 RX ADMIN — FOLIC ACID 1 MG: 1 TABLET ORAL at 09:43

## 2024-07-25 RX ADMIN — PANTOPRAZOLE SODIUM 40 MG: 40 TABLET, DELAYED RELEASE ORAL at 06:02

## 2024-07-25 NOTE — PLAN OF CARE
Problem: Safety - Adult  Goal: Free from fall injury  Outcome: Progressing     Problem: Skin/Tissue Integrity  Goal: Absence of new skin breakdown  Description: 1.  Monitor for areas of redness and/or skin breakdown  2.  Assess vascular access sites hourly  3.  Every 4-6 hours minimum:  Change oxygen saturation probe site  4.  Every 4-6 hours:  If on nasal continuous positive airway pressure, respiratory therapy assess nares and determine need for appliance change or resting period.  Outcome: Progressing     Problem: Nutrition Deficit:  Goal: Optimize nutritional status  Outcome: Progressing     Problem: Chronic Conditions and Co-morbidities  Goal: Patient's chronic conditions and co-morbidity symptoms are monitored and maintained or improved  Outcome: Progressing

## 2024-07-25 NOTE — PLAN OF CARE
Problem: Safety - Adult  Goal: Free from fall injury  7/25/2024 1104 by Anastasiya Edmondson RN  Outcome: Progressing  7/25/2024 0152 by Sheree Kearns RN  Outcome: Progressing     Problem: Skin/Tissue Integrity  Goal: Absence of new skin breakdown  Description: 1.  Monitor for areas of redness and/or skin breakdown  2.  Assess vascular access sites hourly  3.  Every 4-6 hours minimum:  Change oxygen saturation probe site  4.  Every 4-6 hours:  If on nasal continuous positive airway pressure, respiratory therapy assess nares and determine need for appliance change or resting period.  7/25/2024 1104 by Anastasiya Edmondson RN  Outcome: Progressing  7/25/2024 0152 by Sheree Kearns RN  Outcome: Progressing     Problem: Nutrition Deficit:  Goal: Optimize nutritional status  7/25/2024 1104 by Anastasiya Edmondson RN  Outcome: Progressing  7/25/2024 0152 by Sheree Kearns RN  Outcome: Progressing     Problem: Chronic Conditions and Co-morbidities  Goal: Patient's chronic conditions and co-morbidity symptoms are monitored and maintained or improved  7/25/2024 1104 by Anastasiya Edmondson RN  Outcome: Progressing  7/25/2024 0152 by Sheree Kearns RN  Outcome: Progressing

## 2024-07-25 NOTE — CARE COORDINATION
Ss note:7/25/2024 1:24 PM Discharge order noted. Follow up visit to room, Pt resides with spouse, is independent and therapy has worked with pt, no therapy needs at discharge. Pt with dx of Breast CA, follows at [facility] for chemo. Pt relays  or son will transport home. No transition of care needs relayed. RUFINO Cosby

## 2024-07-25 NOTE — PROGRESS NOTES
S:Patient states she is feeling better each day, had about 3 loose stools yesterday, no bleeding, she is eating, no fevers     O:  Temp 98.2 P-89 R-18 /58  Gen- alert, talking, no acute distress, pallor  HEENT- mmm, no oral lesions  Lungs- clear  Cardiac- regular  Abd- soft/nt/nd +BS  Left breast mass upper outer quadrant,   Ext- warm and well perfused     Labs   Today's labs pending  7-24-24 B12>2000, folate slightly low at 4.5,   C.Diff stool negative  wbc=10 Hb=7.3 plt=70 with 81% segs creatinine 1.3 potassium 3.1       Meds:     Synthroid, lipitor, compazine, B12,, folic acid, imoidum prn,protonix     A/P:  63 year old female known to Dr Khan with LN + ER- [id number] + upper outer quadrant left breast cancer s/p 3 cycles of neoadjuvant carbo-taxotere-herceptin-perjeta with GCSF support last given 7-11-24 admitted with dehydration,c ytopenias, renal failure. Her left breast mass is improving.   Patient states she has had some chronic diarrhea since April 2024 when found to have gallstones but diarrhea significantly worsened on chemotherapy with multiple watery stools a day.She was admitted with hypokalema/dehydrations from diarrhea and anemia and thrombocytopenia from chemotherapy and ARF from pre renal dehydration improved. C.Diff negative      Encouraged patient to use imodium as needed  CBC with diff daily , bmp pending  Thrombocytopenia due to chemotherapy will improve  Okay to d/c if eating/drinking and Hb stable and electrolytes good, f//u pending labs    Sangeeta Paris MD

## 2024-07-25 NOTE — DISCHARGE INSTRUCTIONS
Your information:  Name: Zuri Boggs  : 1960    Your instructions:    YOU ARE BEING DISCHARGED HOME. PLEASE MAKE AND KEEP YOUR FOLLOW UP APPOINTMENTS.    IF YOU EXPERIENCE ANY OF THE FOLLOWING SYMPTOMS, CHEST PAIN, SHORTNESS OF BREATH, COUGHING UP BLOOD OR BLOODY SPUTUM, STOMACH PAIN OR CRAMPING, DARK, TARRY STOOLS, LOSS OF APPETITE, GENERAL NOT FEELING WELL, SIGNS AND SYMPTOMS OF INFECTION LIKE FEVER AND OR CHILLS, PLEASE CALL PCP OR RETURN TO THE EMERGENCY ROOM.      What to do after you leave the hospital:    Recommended diet: regular diet    Recommended activity: activity as tolerated        The following personal items were collected during your admission and were returned to you:    Belongings  Dental Appliances: None  Vision - Corrective Lenses: Eyeglasses, At home  Hearing Aid: None  Clothing: Pants, Shirt, Undergarments, Socks, Footwear  Jewelry: Earrings  Body Piercings Removed: N/A  Electronic Devices: Cell Phone,   Weapons (Notify Protective Services/Security): None  Other Valuables: Purse  Home Medications: None  Valuables Given To: Patient  Provide Name(s) of Who Valuable(s) Were Given To: pt  Responsible person(s) in the waiting room: na  Patient approves for provider to speak to responsible person post operatively: Yes    Information obtained by:  By signing below, I understand that if any problems occur once I leave the hospital I am to contact PCP.  I understand and acknowledge receipt of the instructions indicated above.

## 2024-07-25 NOTE — PROGRESS NOTES
Pharmacy Discharge Reconciliation & Education    Counseled patient on the importance of adherence with new medications. Patient was by herself.  New medication(s), folic acid, imodium (patient has OTC already at home), pantoprazole, metformin ER (in place of IR for concern with loose stools).  Discussed the purpose of each medication, as well as the dose, frequency, and common side effects/mitigation strategies.  All questions asked were answered or deferred to the inpatient team providers. Patient encouraged to reach out with any other additional questions or concerns that they may have.  Patient verbalized understanding.    Patient educated on stopping IBU to which she states using Aleve on rare occasions so risk vs benefit of NSAIDs reviewed. Patient also educated on plan to only continue PPI short term and plan to wean off towards end of this prescription.     Nell Quiroz, Glenda 7/25/2024 12:59 PM  W: [phone]

## 2024-07-26 LAB
ABO/RH: NORMAL
ANTIBODY SCREEN: NEGATIVE
ARM BAND NUMBER: NORMAL
BLOOD BANK BLOOD PRODUCT EXPIRATION DATE: NORMAL
BLOOD BANK BLOOD PRODUCT EXPIRATION DATE: NORMAL
BLOOD BANK DISPENSE STATUS: NORMAL
BLOOD BANK DISPENSE STATUS: NORMAL
BLOOD BANK ISBT PRODUCT BLOOD TYPE: 5100
BLOOD BANK ISBT PRODUCT BLOOD TYPE: 5100
BLOOD BANK PRODUCT CODE: NORMAL
BLOOD BANK PRODUCT CODE: NORMAL
BLOOD BANK SAMPLE EXPIRATION: NORMAL
BLOOD BANK UNIT TYPE AND RH: NORMAL
BLOOD BANK UNIT TYPE AND RH: NORMAL
BPU ID: NORMAL
BPU ID: NORMAL
COMPONENT: NORMAL
COMPONENT: NORMAL
CROSSMATCH RESULT: NORMAL
CROSSMATCH RESULT: NORMAL
TRANSFUSION STATUS: NORMAL
TRANSFUSION STATUS: NORMAL
UNIT DIVISION: 0
UNIT DIVISION: 0
UNIT ISSUE DATE/TIME: NORMAL
UNIT ISSUE DATE/TIME: NORMAL

## 2024-07-29 NOTE — PROGRESS NOTES
Physician Progress Note      PATIENT:               RICHAR SILVA  CSN #:                  [id number]  :                       1960  ADMIT DATE:       2024 11:17 AM  DISCH DATE:        2024 5:33 PM  RESPONDING  PROVIDER #:        Rafa Quispe MD          QUERY TEXT:    Dear ,    Patient admitted with JACKIE. Noted to have Moderate malnutrition per Dietician   assessment on 2024. If possible, please document in progress notes and   discharge summary if you are evaluating and /or treating any of the following:    The medical record reflects the following:  Risk Factors: Invasive ductal carcinoma left breast with chemotherapy  Clinical Indicators: per Dietician consult note : \"Moderate malnutrition   related to catabolic illness, other (comment), altered taste perception   (breast cancer w/ chemo) as evidenced by weight loss 7.5% in 3 months,   Criteria as identified in malnutrition assessment, intake 26-50%, nausea   (-10.8% in three months)  ? Increased nutrient needs related to catabolic illness, other (comment)   (breast cancer w/ chemo) as evidenced by intake 26-50%, weight loss 7.5% in 3   months (-10.8% in three months)\"  Treatment: Dietician consult, ONS and monitor    Thank You,  Mariann Crawford RN, BSN, CDS  Clinical Documentation Improvement Specialist  [phone]  Options provided:  -- Protein calorie malnutrition moderate  -- Other - I will add my own diagnosis  -- Disagree - Not applicable / Not valid  -- Disagree - Clinically unable to determine / Unknown  -- Refer to Clinical Documentation Reviewer    PROVIDER RESPONSE TEXT:    This patient has moderate protein calorie malnutrition.    Query created by: Mariann Crawford on 2024 1:50 PM      Electronically signed by:  Rafa Quispe MD 2024 6:30 PM

## 2024-07-31 NOTE — DISCHARGE SUMMARY
[facility] Hospitalist       Hospitalist Physician Discharge Summary       Winter Sarkar MD  [address]  [place] [address] [phone]    Call  Call as soon as possible for an appointment.    Sangeeta Paris MD  [address]  [address]  [place] [facility] [address] [phone]    Call  Follow up as directed.      Activity level: As tolerated    Diet: No diet orders on file    Labs:None     Condition at discharge: Stable    Dispo:Home      Patient ID:  Zuri Boggs  [id number]  63 y.o.  1960    Admit date: 7/22/2024    Discharge date and time: 7/25/2024    Admission Diagnoses: Principal Problem:    JACKIE (acute kidney injury) (HCC)  Active Problems:    Malignant neoplasm of overlapping sites of left breast in female, estrogen receptor negative (HCC)    Syncope due to orthostatic hypotension    Hypokalemia    Hypomagnesemia    Invasive ductal carcinoma of breast, female, left (HCC)    Closed head injury    Syncope and collapse    Moderate protein-calorie malnutrition (HCC)  Resolved Problems:    * No resolved hospital problems. *      Discharge Diagnoses: Principal Problem:    JACKIE (acute kidney injury) (HCC)  Active Problems:    Malignant neoplasm of overlapping sites of left breast in female, estrogen receptor negative (HCC)    Syncope due to orthostatic hypotension    Hypokalemia    Hypomagnesemia    Invasive ductal carcinoma of breast, female, left (HCC)    Closed head injury    Syncope and collapse    Moderate protein-calorie malnutrition (HCC)  Resolved Problems:    * No resolved hospital problems. *      Consults:  IP CONSULT TO ONCOLOGY    Procedures: None    History of Present Illness:  63 y.o. female with history of invasive ductal carcinoma of the left breast who is undergoing chemotherapy prior to surgery.  She completed her 3/5 cycles of chemotherapy 2 weeks ago.  She was at the [facility] today when she had a syncopal episode fell and hit her head

## 2024-08-14 ENCOUNTER — TELEPHONE (OUTPATIENT)
Dept: BREAST CENTER | Age: 64
End: 2024-08-14

## 2024-08-16 ENCOUNTER — TELEPHONE (OUTPATIENT)
Dept: BREAST CENTER | Age: 64
End: 2024-08-16

## 2024-08-16 DIAGNOSIS — Z17.1 MALIGNANT NEOPLASM OF OVERLAPPING SITES OF LEFT BREAST IN FEMALE, ESTROGEN RECEPTOR NEGATIVE (HCC): Primary | ICD-10-CM

## 2024-08-16 DIAGNOSIS — C50.812 MALIGNANT NEOPLASM OF OVERLAPPING SITES OF LEFT BREAST IN FEMALE, ESTROGEN RECEPTOR NEGATIVE (HCC): Primary | ICD-10-CM

## 2024-08-16 NOTE — TELEPHONE ENCOUNTER
Talked with Zuri.  My suggestion is MRI to see if response on MRI is as good as clinical response.  I will place order

## 2024-08-21 ENCOUNTER — TELEPHONE (OUTPATIENT)
Dept: BREAST CENTER | Age: 64
End: 2024-08-21

## 2024-08-21 NOTE — TELEPHONE ENCOUNTER
Authorization submitted to [employer] insurance -- patient information has been faxed-- Authorization Pending until clinicals are reviewed. Requested marked Urgent.

## 2024-09-03 ENCOUNTER — TELEPHONE (OUTPATIENT)
Dept: BREAST CENTER | Age: 64
End: 2024-09-03

## 2024-09-03 NOTE — TELEPHONE ENCOUNTER
RN received return call from patient inrdanellerds to lab work. Patient states she sees the [facility] in [place] and had labwork done 2 weeks ago and today. RN advised she would contact them and get lab results.     RN reached out to the [facility] and had to leave a message with medical records department. RN requested information and awaiting results.         Electronically signed by Sonja Child RN on 9/3/24 at 11:37 AM EDT

## 2024-09-03 NOTE — TELEPHONE ENCOUNTER
RN attempted to contact patient to notify that she needs to get lab work by end of today for her upcoming breast MRI. RN reached patient VM and left detailed message of why was calling and office number for patient to call office back if has questions. RN advised if lab work wasn't completed by end of day tomorrow 9/4/2024 the breast MRI would have to be canceled.       Electronically signed by Sonja Child RN on 9/3/24 at 10:22 AM EDT

## 2024-09-05 ENCOUNTER — HOSPITAL ENCOUNTER (OUTPATIENT)
Dept: MRI IMAGING | Age: 64
Discharge: HOME | End: 2024-09-07
Payer: COMMERCIAL

## 2024-09-05 DIAGNOSIS — Z17.1 MALIGNANT NEOPLASM OF OVERLAPPING SITES OF LEFT BREAST IN FEMALE, ESTROGEN RECEPTOR NEGATIVE (HCC): ICD-10-CM

## 2024-09-05 DIAGNOSIS — C50.812 MALIGNANT NEOPLASM OF OVERLAPPING SITES OF LEFT BREAST IN FEMALE, ESTROGEN RECEPTOR NEGATIVE (HCC): ICD-10-CM

## 2024-09-05 PROCEDURE — C8908 MRI W/O FOL W/CONT, BREAST,: HCPCS

## 2024-09-05 PROCEDURE — 6360000004 HC RX CONTRAST MEDICATION: Performed by: RADIOLOGY

## 2024-09-05 PROCEDURE — A9585 GADOBUTROL INJECTION: HCPCS | Performed by: RADIOLOGY

## 2024-09-05 RX ORDER — GADOBUTROL 604.72 MG/ML
7 INJECTION INTRAVENOUS
Status: COMPLETED | OUTPATIENT
Start: 2024-09-05 | End: 2024-09-05

## 2024-09-05 RX ADMIN — GADOBUTROL 7 ML: 604.72 INJECTION INTRAVENOUS at 08:54

## 2024-09-24 ENCOUNTER — OFFICE VISIT (OUTPATIENT)
Dept: BREAST CENTER | Age: 64
End: 2024-09-24
Payer: COMMERCIAL

## 2024-09-24 VITALS
BODY MASS INDEX: 30.23 KG/M2 | HEART RATE: 96 BPM | WEIGHT: 154 LBS | RESPIRATION RATE: 16 BRPM | OXYGEN SATURATION: 100 % | DIASTOLIC BLOOD PRESSURE: 62 MMHG | SYSTOLIC BLOOD PRESSURE: 120 MMHG | HEIGHT: 60 IN | TEMPERATURE: 97.7 F

## 2024-09-24 DIAGNOSIS — Z17.1 MALIGNANT NEOPLASM OF OVERLAPPING SITES OF LEFT BREAST IN FEMALE, ESTROGEN RECEPTOR NEGATIVE (HCC): Primary | ICD-10-CM

## 2024-09-24 DIAGNOSIS — C50.812 MALIGNANT NEOPLASM OF OVERLAPPING SITES OF LEFT BREAST IN FEMALE, ESTROGEN RECEPTOR NEGATIVE (HCC): Primary | ICD-10-CM

## 2024-09-24 PROCEDURE — 99212 OFFICE O/P EST SF 10 MIN: CPT | Performed by: SURGERY

## 2024-09-24 PROCEDURE — G8417 CALC BMI ABV UP PARAM F/U: HCPCS | Performed by: SURGERY

## 2024-09-24 PROCEDURE — G9899 SCRN MAM PERF RSLTS DOC: HCPCS | Performed by: SURGERY

## 2024-09-24 PROCEDURE — 1036F TOBACCO NON-USER: CPT | Performed by: SURGERY

## 2024-09-24 PROCEDURE — G8427 DOCREV CUR MEDS BY ELIG CLIN: HCPCS | Performed by: SURGERY

## 2024-09-24 PROCEDURE — 3017F COLORECTAL CA SCREEN DOC REV: CPT | Performed by: SURGERY

## 2024-09-24 PROCEDURE — 99213 OFFICE O/P EST LOW 20 MIN: CPT | Performed by: SURGERY

## 2024-10-17 ENCOUNTER — TELEPHONE (OUTPATIENT)
Dept: BREAST CENTER | Age: 64
End: 2024-10-17

## 2024-10-17 DIAGNOSIS — C50.812 MALIGNANT NEOPLASM OF OVERLAPPING SITES OF LEFT BREAST IN FEMALE, ESTROGEN RECEPTOR NEGATIVE (HCC): Primary | ICD-10-CM

## 2024-10-17 DIAGNOSIS — Z17.1 MALIGNANT NEOPLASM OF OVERLAPPING SITES OF LEFT BREAST IN FEMALE, ESTROGEN RECEPTOR NEGATIVE (HCC): Primary | ICD-10-CM

## 2024-10-17 NOTE — TELEPHONE ENCOUNTER
Authorization is pending clinical review with [employer] insurance -- patient information has been faxed -- request has been marked Urgent -- may take 2-5 business days.

## 2024-10-18 NOTE — TELEPHONE ENCOUNTER
Breast MRI  [id number] Approval#  [id number]  valid 10/17/24 - 12/31/24 per [facility] place  -

## 2024-10-29 NOTE — PROGRESS NOTES
Surgery Progress Note            Chief complaint:   Chief Complaint   Patient presents with    Emesis    Abdominal Pain     LOWER ABD PAIN SINCE SUNDAY/ DIARRHEA      Patient Active Problem List   Diagnosis    Obstruction of intestine by gallstone (HCC)    Gallstone ileus (HCC)    Nausea vomiting and diarrhea    SIRS (systemic inflammatory response syndrome) (HCC)    Hypophosphatemia       S: doing well    O:   Vitals:    04/26/24 0745   BP: (!) 122/59   Pulse: 84   Resp: 18   Temp: 98.3 °F (36.8 °C)   SpO2: 95%       Intake/Output Summary (Last 24 hours) at 4/26/2024 1522  Last data filed at 4/26/2024 0759  Gross per 24 hour   Intake 10 ml   Output --   Net 10 ml             Labs:  Lab Results   Component Value Date/Time    WBC 6.0 04/26/2024 06:44 AM    WBC 5.7 04/25/2024 02:16 PM    WBC 5.3 04/25/2024 12:17 PM    HGB 7.3 04/26/2024 06:44 AM    HGB 7.4 04/25/2024 02:16 PM    HGB 7.4 04/25/2024 12:17 PM    HCT 23.6 04/26/2024 06:44 AM    HCT 22.8 04/25/2024 02:16 PM    HCT 23.6 04/25/2024 12:17 PM     Lab Results   Component Value Date    CREATININE 0.6 04/26/2024    BUN 15 04/26/2024     04/26/2024    K 3.5 04/26/2024     04/26/2024    CO2 25 04/26/2024     Lab Results   Component Value Date/Time    LIPASE 15 04/23/2024 03:31 PM         Physical exam:   BP (!) 122/59   Pulse 84   Temp 98.3 °F (36.8 °C) (Oral)   Resp 18   Ht 1.499 m (4' 11\")   Wt 74.8 kg (165 lb)   SpO2 95%   BMI 33.33 kg/m²   General appearance: NAD  Head: NCAT  Neck: supple, no masses  Lungs: equal chest rise bilateral  Heart: S1S2 present  Abdomen: soft, minimally tender, nondistended,  Incisions c/d/i  Skin; no lesions  Gu: no cva tenderness  Extremities: extremities normal, atraumatic, no cyanosis or edema    A:  POD # 2 Laparoscopic small bowel resection and removal of intestinal foreign body. Mobilization and placement of omental patch.     P: regular diet and d/c when bowels move    Howie Arce MD  4/26/2024     98

## 2024-10-31 ENCOUNTER — HOSPITAL ENCOUNTER (OUTPATIENT)
Dept: GENERAL RADIOLOGY | Age: 64
Discharge: HOME | End: 2024-11-02
Attending: SURGERY
Payer: COMMERCIAL

## 2024-10-31 ENCOUNTER — HOSPITAL ENCOUNTER (OUTPATIENT)
Dept: MRI IMAGING | Age: 64
Discharge: HOME | End: 2024-11-02
Payer: COMMERCIAL

## 2024-10-31 VITALS — WEIGHT: 160 LBS | BODY MASS INDEX: 31.25 KG/M2

## 2024-10-31 DIAGNOSIS — Z17.1 MALIGNANT NEOPLASM OF OVERLAPPING SITES OF LEFT BREAST IN FEMALE, ESTROGEN RECEPTOR NEGATIVE (HCC): ICD-10-CM

## 2024-10-31 DIAGNOSIS — C50.812 MALIGNANT NEOPLASM OF OVERLAPPING SITES OF LEFT BREAST IN FEMALE, ESTROGEN RECEPTOR NEGATIVE (HCC): ICD-10-CM

## 2024-10-31 PROCEDURE — A9585 GADOBUTROL INJECTION: HCPCS | Performed by: RADIOLOGY

## 2024-10-31 PROCEDURE — G0279 TOMOSYNTHESIS, MAMMO: HCPCS

## 2024-10-31 PROCEDURE — 6360000004 HC RX CONTRAST MEDICATION: Performed by: RADIOLOGY

## 2024-10-31 PROCEDURE — C8908 MRI W/O FOL W/CONT, BREAST,: HCPCS

## 2024-10-31 RX ORDER — GADOBUTROL 604.72 MG/ML
7 INJECTION INTRAVENOUS
Status: COMPLETED | OUTPATIENT
Start: 2024-10-31 | End: 2024-10-31

## 2024-10-31 RX ADMIN — GADOBUTROL 7 ML: 604.72 INJECTION INTRAVENOUS at 08:23

## 2024-11-05 ENCOUNTER — OFFICE VISIT (OUTPATIENT)
Dept: BREAST CENTER | Age: 64
End: 2024-11-05
Payer: COMMERCIAL

## 2024-11-05 VITALS
TEMPERATURE: 98.1 F | BODY MASS INDEX: 31.61 KG/M2 | HEIGHT: 60 IN | DIASTOLIC BLOOD PRESSURE: 60 MMHG | OXYGEN SATURATION: 98 % | SYSTOLIC BLOOD PRESSURE: 126 MMHG | HEART RATE: 74 BPM | RESPIRATION RATE: 20 BRPM | WEIGHT: 161 LBS

## 2024-11-05 DIAGNOSIS — C50.912 INVASIVE DUCTAL CARCINOMA OF BREAST, FEMALE, LEFT (HCC): Primary | ICD-10-CM

## 2024-11-05 PROCEDURE — 99213 OFFICE O/P EST LOW 20 MIN: CPT | Performed by: SURGERY

## 2024-11-05 PROCEDURE — 3017F COLORECTAL CA SCREEN DOC REV: CPT | Performed by: SURGERY

## 2024-11-05 PROCEDURE — G9899 SCRN MAM PERF RSLTS DOC: HCPCS | Performed by: SURGERY

## 2024-11-05 PROCEDURE — G8427 DOCREV CUR MEDS BY ELIG CLIN: HCPCS | Performed by: SURGERY

## 2024-11-05 PROCEDURE — 99214 OFFICE O/P EST MOD 30 MIN: CPT | Performed by: SURGERY

## 2024-11-05 PROCEDURE — 1036F TOBACCO NON-USER: CPT | Performed by: SURGERY

## 2024-11-05 PROCEDURE — G8484 FLU IMMUNIZE NO ADMIN: HCPCS | Performed by: SURGERY

## 2024-11-05 PROCEDURE — G8417 CALC BMI ABV UP PARAM F/U: HCPCS | Performed by: SURGERY

## 2024-11-05 NOTE — PATIENT INSTRUCTIONS
Radiation oncology to see prior  Referral to plastic for reconstruction  Once complete, will schedule surgery

## 2024-11-05 NOTE — PROGRESS NOTES
as to the possibility of a second surgery if there are issues with surgical margins and or significant rafael involvement..      Prior to scheduling her surgery the patient would like to meet with the plastic surgeon to discuss her candidacy for reconstruction I will make that referral.    Also, the patient has a left subclavian infusion port in place.  I would like to have her meet with radiation oncology to assess first whether or not she has a high likelihood of needing radiation and if so if this will be a problem with her radiation.  I assume it would be.  If so we can remove this at the time of the surgery.    The procedure will be scheduled and any further recommendations will be based on the upcoming surgical pathology report.      This note was created with voice recognition software.  Please excuse any grammatical errors that were not corrected and please contact me if there are any questions.    [email or web address]  [phone]

## 2024-11-13 ENCOUNTER — TELEPHONE (OUTPATIENT)
Dept: CASE MANAGEMENT | Age: 64
End: 2024-11-13

## 2024-11-13 ENCOUNTER — HOSPITAL ENCOUNTER (OUTPATIENT)
Dept: RADIATION ONCOLOGY | Age: 64
Discharge: HOME | End: 2024-11-13
Payer: COMMERCIAL

## 2024-11-13 VITALS
TEMPERATURE: 97.2 F | RESPIRATION RATE: 18 BRPM | BODY MASS INDEX: 30.97 KG/M2 | OXYGEN SATURATION: 99 % | WEIGHT: 158.6 LBS | DIASTOLIC BLOOD PRESSURE: 66 MMHG | HEART RATE: 75 BPM | SYSTOLIC BLOOD PRESSURE: 128 MMHG

## 2024-11-13 PROCEDURE — 99205 OFFICE O/P NEW HI 60 MIN: CPT

## 2024-11-13 NOTE — TELEPHONE ENCOUNTER
Met with patient and her son, Bernardo, during her initial consultation with Dr. Morales for radiation therapy for her left breast ER-SD-, Her2+ cancer diagnosis per Dr. Preston.  Introduced myself and explained my role with cancer patients receiving treatment within our cancer centers.  Patient was friendly and receptive.  States that all of her questions and concerns were fully addressed during her consultation appointment with the radiation oncologist.  Denies any problems with insurance coverage for medication or transportation for the daily treatments.  Reviewed additional ancillary services available at the center.  Patient states that her appetite has improved since chemotherapy completion and she doesn't have any additional referral needs at this time.  RT RN to refer to dietician per nurse screening assessment.  Upon inquiring, states that her next scheduled appointment with Dr. Khan is scheduled for the first week of 1/2025.  Patient is scheduled for consultation with Dr. Araiza on 11/22/2024 and her surgery date with Dr. Preston is possibly the beginning of 12/2024 per patient.  Patient previously provided with written literature by a nurse navigator at her surgical consultation.  Provided additional support information of [facility] support group, [place] [facility], and [place] [facility] with their [employer] program.  Provided patient with my contact information, office hours, and encouragement to call me with questions or concerns.  Patient verbalizes understanding and appreciative of nurse navigator visit.  Emotional support provided and greater than 25 minutes spent with patient.  Nurse navigator will continue to follow.  Assisted to RT  for 1/10/2025 follow up appointment with Dr. Morales.  INO Aguilera, BSW, RN, OCN  Oncology Nurse Navigator

## 2024-11-13 NOTE — PROGRESS NOTES
Radiation Oncology Consult Note         11/13/2024    Zuri Boggs  63 y.o.   1960    REFERRING PROVIDER: Mohan    PCP:  Winter Sarkar MD    CHIEF COMPLAINT: Patient here for radiation oncology consultation    DIAGNOSIS: HER2 positive left breast cancer, upper outer quadrant    STAGING: cT2 N1  HISTORY OF PRESENT ILLNESS: Ms. Zuri Boggs  is a 63 y.o. year old female who noticed a lump in her left breast in the upper outer quadrant, with skin thickening and nipple retraction.  She had never had prior mammography    Mammogram done in April 2024 showed a 5 cm malignant appearing mass in the left upper outer quadrant.  Ultrasound confirmed the mass as well as an enlarged left axillary node    Biopsies of both were positive for invasive carcinoma ER/RI negative, HER2 positive    A marker was placed in the axillary node as well as the breast mass    MRI performed May 17 showed:  1. Known biopsy proven malignancy upper outer left breast 3.7 x 3.8 x 4.6 cm.  2. Abnormal left axillary lymph node 2.7 x 1.8 cm.  3. Strand-like contrast enhancement extends from the medial ventral aspect of  the suspicious left breast mass down to the nipple.  4. Lateral left breast skin thickening and enhancement.  5. Mild left nipple retraction.  6. No other areas of abnormal masslike or non masslike parenchymal contrast  enhancement are noted in the left breast.  7. No abnormality in the internal mammary chain bilateral.  8. No abnormality in the included extra mammary tissues.    Staging workup included a CT of the chest abdomen and pelvis which showed lesions in the T-spine and subcentimeter pulmonary nodules of uncertain significance    MRI of the spine showed hemangiomas and no malignant appearing lesions    Bone scan was negative except for the tibia which had had previous trauma    Patient received 6 cycles of TCHP.  She had 1 hospitalization for becoming lightheaded and falling in the clinic    Also of note, prior to

## 2024-11-13 NOTE — PROGRESS NOTES
Zuri Boggs  1960 63 y.o.      Referring Physician: Dr. Preston    PCP: Winter Sarkar MD     Vitals:    24 0826   BP: 128/66   Pulse: 75   Resp: 18   Temp: 97.2 °F (36.2 °C)   SpO2: 99%        Wt Readings from Last 3 Encounters:   24 71.9 kg (158 lb 9.6 oz)   24 73 kg (161 lb)   10/31/24 72.6 kg (160 lb)        Body mass index is 30.97 kg/m².               Chief Complaint: No chief complaint on file.         Cancer Staging   No matching staging information was found for the patient.      Prior Radiation Therapy? NO    Concurrent Chemo/radiation? NO    Prior Chemotherapy? YES: Site Treated: Left Breast          Facility: [facility]          Date: 24-10/15/24    Prior Hormonal Therapy? NO    Head and Neck Cancer? No, patient does NOT have HN cancer.      LMP: 53    Age at first Menses: 13    : 2    Para: 2        Current Outpatient Medications   Medication Sig Dispense Refill    folic acid (FOLVITE) 1 MG tablet Take 1 tablet by mouth daily (Patient not taking: Reported on 2024) 30 tablet 0    pantoprazole (PROTONIX) 40 MG tablet Take 1 tablet by mouth every morning (before breakfast) (Patient not taking: Reported on 2024) 30 tablet 0    metFORMIN (GLUCOPHAGE-XR) 500 MG extended release tablet Take 1 tablet by mouth 2 times daily (before meals) New formulation (Patient not taking: Reported on 2024) 60 tablet 0    prochlorperazine (COMPAZINE) 10 MG tablet Take 1 tablet by mouth every 6 hours as needed (Nausea/Vomiting) (Patient not taking: Reported on 2024)      vitamin B-12 (CYANOCOBALAMIN) 1000 MCG tablet Take 1 tablet by mouth Every Monday, Wednesday, and Friday (Patient not taking: Reported on 2024)      levothyroxine (SYNTHROID) 25 MCG tablet Take 1 tablet by mouth Daily      atorvastatin (LIPITOR) 20 MG tablet Take 1 tablet by mouth daily (Patient not taking: Reported on 2024)       No current facility-administered medications for this

## 2024-11-22 ENCOUNTER — OFFICE VISIT (OUTPATIENT)
Dept: SURGERY | Age: 64
End: 2024-11-22
Payer: COMMERCIAL

## 2024-11-22 VITALS
OXYGEN SATURATION: 98 % | WEIGHT: 158 LBS | HEIGHT: 60 IN | DIASTOLIC BLOOD PRESSURE: 70 MMHG | TEMPERATURE: 97.6 F | RESPIRATION RATE: 18 BRPM | BODY MASS INDEX: 31.02 KG/M2 | HEART RATE: 92 BPM | SYSTOLIC BLOOD PRESSURE: 106 MMHG

## 2024-11-22 DIAGNOSIS — E11.9 TYPE 2 DIABETES MELLITUS WITHOUT COMPLICATION, UNSPECIFIED WHETHER LONG TERM INSULIN USE (HCC): Primary | ICD-10-CM

## 2024-11-22 PROCEDURE — 3044F HG A1C LEVEL LT 7.0%: CPT | Performed by: PLASTIC SURGERY

## 2024-11-22 PROCEDURE — 99203 OFFICE O/P NEW LOW 30 MIN: CPT | Performed by: PLASTIC SURGERY

## 2024-11-22 PROCEDURE — G9899 SCRN MAM PERF RSLTS DOC: HCPCS | Performed by: PLASTIC SURGERY

## 2024-11-22 PROCEDURE — G8484 FLU IMMUNIZE NO ADMIN: HCPCS | Performed by: PLASTIC SURGERY

## 2024-11-22 PROCEDURE — 3017F COLORECTAL CA SCREEN DOC REV: CPT | Performed by: PLASTIC SURGERY

## 2024-11-22 PROCEDURE — 1036F TOBACCO NON-USER: CPT | Performed by: PLASTIC SURGERY

## 2024-11-22 PROCEDURE — G8427 DOCREV CUR MEDS BY ELIG CLIN: HCPCS | Performed by: PLASTIC SURGERY

## 2024-11-22 PROCEDURE — G8417 CALC BMI ABV UP PARAM F/U: HCPCS | Performed by: PLASTIC SURGERY

## 2024-11-22 PROCEDURE — 2022F DILAT RTA XM EVC RTNOPTHY: CPT | Performed by: PLASTIC SURGERY

## 2024-11-22 NOTE — PROGRESS NOTES
Department of Plastic Surgery - Adult  Attending Consult Note          CHIEF COMPLAINT:  History of Left Breast Cancer    History Obtained From:  patient    HISTORY OF PRESENT ILLNESS:                The patient is a 63 y.o. female who presents with History of Left Breast Cancer.  She is here today to discuss her options on breast reconstruction. The patient has met with Dr. Preston and has elected to proceed with left mastectomy.  She has no treatments of chemotherapy remaining and will  be receiving radiation after mastectomy.  The patients Breast Cancer History is obtained from the patient and also notes from the care team.      Past Medical History:    Past Medical History:   Diagnosis Date    Breast cancer (HCC)     Found out May 2024, Dr. Preston    Diabetes mellitus (HCC)     Hx antineoplastic chemo     Hyperlipidemia     Thyroid disease      Past Surgical History:    Past Surgical History:   Procedure Laterality Date    BREAST BIOPSY      COLECTOMY N/A 04/24/2024    EXPLORATORY LAPAROSCOPY SMALL  BOWEL RESECTION performed by Howie Arce MD at [facility] OR    COLONOSCOPY      12/2023 Dr. Orta    ERCP      removed gallstone    SOHA NEEDLE BIOPSY LYMPH NODE SUPERFICIAL  05/16/2024    SOHA NEEDLE BIOPSY LYMPH NODE SUPERFICIAL 5/16/2024 [facility]    SOHA US PERQ DEVICE SOFT TISSUE PLMT  FIRST LESION  05/16/2024    SOHA US PERQ DEVICE SOFT TISSUE PLMT  FIRST LESION 5/16/2024 [facility]    PORT SURGERY Left 05/17/2024    MEDIPORT INSERTION performed by Howie Arce MD at [facility] OR    PORT SURGERY N/A 07/05/2024    MEDIPORT REMOVAL AND REPLACEMENT performed by Howie Arce MD at [facility] OR     Current Medications:      Current Outpatient Medications   Medication Sig Dispense Refill    levothyroxine (SYNTHROID) 25 MCG tablet Take 1 tablet by mouth Daily      folic acid (FOLVITE) 1 MG tablet Take 1 tablet by mouth daily (Patient not taking: Reported on 9/24/2024) 30 tablet 0    pantoprazole (PROTONIX) 40 MG

## 2024-11-27 ENCOUNTER — OFFICE VISIT (OUTPATIENT)
Dept: SURGERY | Age: 64
End: 2024-11-27
Payer: COMMERCIAL

## 2024-11-27 VITALS
OXYGEN SATURATION: 99 % | SYSTOLIC BLOOD PRESSURE: 130 MMHG | DIASTOLIC BLOOD PRESSURE: 73 MMHG | TEMPERATURE: 97.1 F | HEART RATE: 88 BPM

## 2024-11-27 DIAGNOSIS — C50.912 INVASIVE DUCTAL CARCINOMA OF BREAST, FEMALE, LEFT (HCC): Primary | ICD-10-CM

## 2024-11-27 PROCEDURE — 99212 OFFICE O/P EST SF 10 MIN: CPT | Performed by: PLASTIC SURGERY

## 2024-11-27 PROCEDURE — 1036F TOBACCO NON-USER: CPT | Performed by: PLASTIC SURGERY

## 2024-11-27 PROCEDURE — G9899 SCRN MAM PERF RSLTS DOC: HCPCS | Performed by: PLASTIC SURGERY

## 2024-11-27 PROCEDURE — G8427 DOCREV CUR MEDS BY ELIG CLIN: HCPCS | Performed by: PLASTIC SURGERY

## 2024-11-27 PROCEDURE — G8417 CALC BMI ABV UP PARAM F/U: HCPCS | Performed by: PLASTIC SURGERY

## 2024-11-27 PROCEDURE — G8484 FLU IMMUNIZE NO ADMIN: HCPCS | Performed by: PLASTIC SURGERY

## 2024-11-27 PROCEDURE — 3017F COLORECTAL CA SCREEN DOC REV: CPT | Performed by: PLASTIC SURGERY

## 2024-11-27 NOTE — PROGRESS NOTES
injury) (HCC)    Hypokalemia    Hypomagnesemia    Syncope due to orthostatic hypotension    Invasive ductal carcinoma of breast, female, left (HCC)    Closed head injury    Syncope and collapse    Moderate protein-calorie malnutrition (HCC)       Plan:     Left breast cancer    After all the patient's questions were answered and further discussion of breast reconstruction was reviewed the patient elects at this time to move forward with right prophylactic mastectomy.    I had a long discussion with the patient and her son today again in regards to her need for radiation therapy to the left mastectomy defect and the concern for postoperative complications following her need for radiation therapy.  After discussion again her options on autologous tissue transfer the patient does not want to move forward with this more invasive type surgery.    She and her son both agreed moving forward with a right prophylactic mastectomy for achieving more optimal symmetry postoperatively.    Surgical plan: Right prophylactic mastectomy    The risks, benefits and options were discussed with the pt. The risks included but not limited to pain, bleeding, infection, heavy scarring, damage to surrounding structures, fluid collections, asymmetry, and need for further procedures. All of Her questions were answered to her satisfaction and She agrees to proceed with the operation.      Call office with concerns or signs of infection.      Jose Alfredo Araiza MD

## 2024-12-30 ENCOUNTER — PREP FOR PROCEDURE (OUTPATIENT)
Dept: SURGERY | Age: 64
End: 2024-12-30

## 2024-12-30 ENCOUNTER — TELEPHONE (OUTPATIENT)
Dept: SURGERY | Age: 64
End: 2024-12-30

## 2024-12-30 PROBLEM — C50.912 BREAST CANCER, LEFT BREAST (HCC): Status: ACTIVE | Noted: 2024-12-30

## 2024-12-30 NOTE — TELEPHONE ENCOUNTER
Surgery has been scheduled at [address] on 01/16/25, Pre-Admission Testing will call you prior to surgery to inform you arrival time and any other additional directions,if they are unable to reach you,please call them two days prior at [phone].  If taking Fish Oil, Vitamins, two weeks prior to surgery stop taking. If taking NSAIDS (such as Aspirin, Ibuprofen) anticoagulants please consult with your prescribing physician to get further instructions on when to stop medication prior to surgery that is scheduled, patient understood.    Pre-Auth #:  CPT Codes:   ICD 10:

## 2024-12-31 NOTE — H&P
Department of Plastic Surgery - Adult  Attending Consult Note              CHIEF COMPLAINT:  History of Left Breast Cancer     History Obtained From:  patient     HISTORY OF PRESENT ILLNESS:                 The patient is a 63 y.o. female who presents with History of Left Breast Cancer.  She is here today to discuss her options on breast reconstruction. The patient has met with Dr. Preston and has elected to proceed with left mastectomy.  She has no treatments of chemotherapy remaining and will  be receiving radiation after mastectomy.  The patients Breast Cancer History is obtained from the patient and also notes from the care team.       Past Medical History:    Past Medical History        Past Medical History:   Diagnosis Date    Breast cancer (HCC)       Found out May 2024, Dr. Preston    Diabetes mellitus (HCC)      Hx antineoplastic chemo      Hyperlipidemia      Thyroid disease           Past Surgical History:    Past Surgical History         Past Surgical History:   Procedure Laterality Date    BREAST BIOPSY        COLECTOMY N/A 04/24/2024     EXPLORATORY LAPAROSCOPY SMALL  BOWEL RESECTION performed by Howie Arce MD at [facility] OR    COLONOSCOPY         12/2023 Dr. Orta    ERCP         removed gallstone    SOHA NEEDLE BIOPSY LYMPH NODE SUPERFICIAL   05/16/2024     SOHA NEEDLE BIOPSY LYMPH NODE SUPERFICIAL 5/16/2024 [facility]    SOHA US PERQ DEVICE SOFT TISSUE PLMT  FIRST LESION   05/16/2024     SOHA US PERQ DEVICE SOFT TISSUE PLMT  FIRST LESION 5/16/2024 [facility]    PORT SURGERY Left 05/17/2024     MEDIPORT INSERTION performed by Howie Arce MD at [facility] OR    PORT SURGERY N/A 07/05/2024     MEDIPORT REMOVAL AND REPLACEMENT performed by Howie Arce MD at [facility] OR         Current Medications:      Current Facility-Administered Medications          Current Outpatient Medications   Medication Sig Dispense Refill    levothyroxine (SYNTHROID) 25 MCG tablet Take 1 tablet by mouth Daily         Transportation      Lack of Transportation (Medical): No      Lack of Transportation (Non-Medical): No   Physical Activity: Not on file   Stress: Not on file   Social Connections: Not on file   Intimate Partner Violence: Not on file   Housing Stability: Unknown (7/22/2024)     Housing Stability Vital Sign      Unable to Pay for Housing in the Last Year: Not on file      Number of Places Lived in the Last Year: 1      Unstable Housing in the Last Year: No         Family History:   Family History         Family History   Problem Relation Age of Onset    Diabetes Mother              REVIEW OF SYSTEMS:    CONSTITUTIONAL:  negative  RESPIRATORY:  negative  CARDIOVASCULAR:  negative  GASTROINTESTINAL:  negative  BEHAVIOR/PSYCH:  negative  All other review of systems negative     PHYSICAL EXAM:          VITALS:  /70 (Site: Left Upper Arm, Position: Sitting, Cuff Size: Medium Adult)   Pulse 92   Temp 97.6 °F (36.4 °C) (Temporal)   Resp 18   Ht 1.524 m (5')   Wt 71.7 kg (158 lb)   SpO2 98%   BMI 30.86 kg/m²   CONSTITUTIONAL:  awake, alert, cooperative, no apparent distress, and appears stated age  EYES: PERRLA, EOMI, no signs of occular infection  MUSCULOSKELETAL: negative for flaccid muscle tone or spastic movements.  NEURO: Cranial nerves II-XII grossly intact. No signs of agitated mood.   LUNGS:  No increased work of breathing, good air exchange, clear to auscultation bilaterally, no crackles or wheezing  CARDIOVASCULAR:  Normal apical impulse, regular rate and rhythm,   ABDOMEN:  Normal bowel sounds, soft, non-distended, non-tender,      LEFT BREAST: Rash is not noted, There are no masses palpated, no axillary lymphadenopathy, no nipple discharge. No breast pain. There are no previous scars-chemo port left chest      RIGHT BREAST: Rash is not noted, There are no masses palpated, no axillary lymphadenopathy, no nipple discharge. No breast pain. There are no previous scars-     0 Result Notes       2 HM

## 2025-01-03 DIAGNOSIS — C50.912 INVASIVE DUCTAL CARCINOMA OF BREAST, FEMALE, LEFT (HCC): Primary | ICD-10-CM

## 2025-01-08 ENCOUNTER — HOSPITAL ENCOUNTER (OUTPATIENT)
Dept: GENERAL RADIOLOGY | Age: 65
Discharge: HOME | End: 2025-01-10
Attending: SURGERY
Payer: COMMERCIAL

## 2025-01-08 DIAGNOSIS — C50.912 INVASIVE DUCTAL CARCINOMA OF BREAST, FEMALE, LEFT (HCC): ICD-10-CM

## 2025-01-08 PROCEDURE — 19285 PERQ DEV BREAST 1ST US IMAG: CPT

## 2025-01-08 PROCEDURE — A4648 IMPLANTABLE TISSUE MARKER: HCPCS

## 2025-01-09 ENCOUNTER — TELEPHONE (OUTPATIENT)
Dept: SURGERY | Age: 65
End: 2025-01-09

## 2025-01-09 RX ORDER — SODIUM CHLORIDE 0.9 % (FLUSH) 0.9 %
5-40 SYRINGE (ML) INJECTION EVERY 12 HOURS SCHEDULED
Status: CANCELLED | OUTPATIENT
Start: 2025-01-09

## 2025-01-09 RX ORDER — SODIUM CHLORIDE 0.9 % (FLUSH) 0.9 %
5-40 SYRINGE (ML) INJECTION PRN
Status: CANCELLED | OUTPATIENT
Start: 2025-01-09

## 2025-01-09 RX ORDER — SODIUM CHLORIDE, SODIUM LACTATE, POTASSIUM CHLORIDE, CALCIUM CHLORIDE 600; 310; 30; 20 MG/100ML; MG/100ML; MG/100ML; MG/100ML
INJECTION, SOLUTION INTRAVENOUS CONTINUOUS
Status: CANCELLED | OUTPATIENT
Start: 2025-01-09

## 2025-01-09 RX ORDER — SODIUM CHLORIDE 9 MG/ML
INJECTION, SOLUTION INTRAVENOUS PRN
Status: CANCELLED | OUTPATIENT
Start: 2025-01-09

## 2025-01-09 NOTE — H&P (VIEW-ONLY)
DIAGNOSIS:  1. (05/07/24) LEFT (UOQ, palp) invasive ductal carcinoma  Clinical stage: T2N1(bx-clip)M0  ER (- ) HI (-) HER2 (3+)  2. Recent lap for SBO     IMAGING/PROCEDURES:  1. (04/02/24) BILATERAL d-mammogram and US ([facility]): BIRADS-5  * LEFT (2:00-3:00, palp) 4.3cm mass  * LEFT mammographic skin thickening and nipple retraction  2. (04/16/24) LEFT US core biopsy ([facility])  3. (05/16/24) MRI  4. (05/16/24) LEFT axillary US biopsy  5. (05/16/24) MRI: BIRADS-6  * LEFT 4.6cm mass with enhancement to nipple  * LEFT axilla abnormal node with biopsy artifact  * LEFT breast skin thickening and enhancement  6. (05/21/24) CT CAP  * ? Small lung nodules  * ? T8/T9 lesions  * ? Tibial lesion  7. (05/21/24) Bone scan  * No activity T8T9  * ? LEFT hemipelvis lesion  8. (09/05/24) MRI: BIRADS-6  * Partial response in breast  * Axillary nodes resolved    HISTORY OF PRESENT ILLNESS  Zuri Boggs presents for surgical management of a HER2 positive node positive left breast cancer after neoadjuvant therapy.    Zuri presented with a locally advanced diagnosis.  She has had what I believed to be a good response to treatment.    BREAST SYMPTOMS  Zuri reports an improvement in symptomatology that she presented with which did include palpable findings and skin retraction.    PAST BREAST HISTORY  Zuri had undergone no prior breast biopsies or surgeries before the recent diagnosis.    PAST MEDICAL/SURGICAL HISTORY  Past Medical History:   Diagnosis Date    Breast cancer (HCC)     Found out May 2024, Dr. Preston    Diabetes mellitus (HCC)     Hx antineoplastic chemo     Hyperlipidemia     Thyroid disease      Past Surgical History:   Procedure Laterality Date    BREAST BIOPSY      COLECTOMY N/A 04/24/2024    EXPLORATORY LAPAROSCOPY SMALL  BOWEL RESECTION performed by Howie Arce MD at [facility] OR    COLONOSCOPY      12/2023 Dr. Orta    ERCP      removed gallstone    SOHA NEEDLE BIOPSY LYMPH NODE SUPERFICIAL  05/16/2024    SOHA NEEDLE  mastectomy.  I discussed with her our efforts to downstage axillary surgery.  We will retrieve the targeted node as well as sentinel nodes and perform frozen sections.    Any further recommendations regarding her treatment will be based on the upcoming final surgical pathology report.      This note was created with voice recognition software.  Please excuse any grammatical errors that were not corrected and please contact me if there are any questions.    [email or web address]  [phone]

## 2025-01-09 NOTE — H&P
DIAGNOSIS:  1. (05/07/24) LEFT (UOQ, palp) invasive ductal carcinoma  Clinical stage: T2N1(bx-clip)M0  ER (- ) HI (-) HER2 (3+)  2. Recent lap for SBO     IMAGING/PROCEDURES:  1. (04/02/24) BILATERAL d-mammogram and US ([facility]): BIRADS-5  * LEFT (2:00-3:00, palp) 4.3cm mass  * LEFT mammographic skin thickening and nipple retraction  2. (04/16/24) LEFT US core biopsy ([facility])  3. (05/16/24) MRI  4. (05/16/24) LEFT axillary US biopsy  5. (05/16/24) MRI: BIRADS-6  * LEFT 4.6cm mass with enhancement to nipple  * LEFT axilla abnormal node with biopsy artifact  * LEFT breast skin thickening and enhancement  6. (05/21/24) CT CAP  * ? Small lung nodules  * ? T8/T9 lesions  * ? Tibial lesion  7. (05/21/24) Bone scan  * No activity T8T9  * ? LEFT hemipelvis lesion  8. (09/05/24) MRI: BIRADS-6  * Partial response in breast  * Axillary nodes resolved    HISTORY OF PRESENT ILLNESS  Zuri Boggs presents for surgical management of a HER2 positive node positive left breast cancer after neoadjuvant therapy.    Zuri presented with a locally advanced diagnosis.  She has had what I believed to be a good response to treatment.    BREAST SYMPTOMS  Zuri reports an improvement in symptomatology that she presented with which did include palpable findings and skin retraction.    PAST BREAST HISTORY  Zuri had undergone no prior breast biopsies or surgeries before the recent diagnosis.    PAST MEDICAL/SURGICAL HISTORY  Past Medical History:   Diagnosis Date    Breast cancer (HCC)     Found out May 2024, Dr. Preston    Diabetes mellitus (HCC)     Hx antineoplastic chemo     Hyperlipidemia     Thyroid disease      Past Surgical History:   Procedure Laterality Date    BREAST BIOPSY      COLECTOMY N/A 04/24/2024    EXPLORATORY LAPAROSCOPY SMALL  BOWEL RESECTION performed by Howie Arce MD at [facility] OR    COLONOSCOPY      12/2023 Dr. Orta    ERCP      removed gallstone    SOHA NEEDLE BIOPSY LYMPH NODE SUPERFICIAL  05/16/2024    SOHA NEEDLE

## 2025-01-09 NOTE — TELEPHONE ENCOUNTER
Yashira from Pre Admission Testing called and said that patient's PCP is on medical leave but she went to another doctor(patient did not know doctor's name) on 1/6/25 for medical clearance and had an EKG done on 1/7/25. Clearances are to be sent to our office for her surgery on 1/16/25.

## 2025-01-09 NOTE — PROGRESS NOTES
[facility]   PRE-ADMISSION TESTING GENERAL INSTRUCTIONS  PAT Phone Number: [phone]      GENERAL INSTRUCTIONS:    [x] Antibacterial Soap Shower Night before AND the Morning of procedure.    [x] Do not wear makeup, lotions, powders, deodorant the morning of surgery.  [x] No solid food after midnight. You may have SIPS of clear liquids up until 2 hours before your arrival time to the hospital.   [x] You may brush your teeth, gargle, but do not swallow water.   [x] No tobacco products, illegal drugs, or alcohol within 24 hours of your surgery.  [x] Jewelry or valuables should not be brought to the hospital. All body and/or tongue piercing's must be removed prior to arriving to hospital. No contact lens or hair pins.   [x] Arrange transportation with a responsible adult  to and from the hospital. Arrange for someone to be with you for the remainder of the day and for 24 hours after your procedure due to having had anesthesia.          -Who will be your  for transportation? Bernardo, sola        -Who will be staying with you for 24 hrs after your procedure? sola Barrios  [x] Bring insurance card and photo ID.       PARKING INSTRUCTIONS:     [x] ARRIVAL DATE & TIME: 1/16  10:30   [x] Times are subject to change. We will contact you the business day before surgery if that were to occur.  [x] Enter into the [facility] Entrance. Two people may accompany you. Masks are not required.  [x] Parking Lot \"I\" is where you will park. It is located on the corner of [address] and [facility]. The entrance is on [facility].   Only one vehicle - per patient, is permitted in parking lot.   Upon entering the parking lot, a voucher ticket will print.    EDUCATION INSTRUCTIONS:             [x] Pain: Post-op pain is normal and to be expected. You will be asked to rate your pain from 0-10.    MEDICATION INSTRUCTIONS:    [x] Bring a complete list of your medications, please write the

## 2025-01-10 ENCOUNTER — TELEPHONE (OUTPATIENT)
Dept: BREAST CENTER | Age: 65
End: 2025-01-10

## 2025-01-10 NOTE — TELEPHONE ENCOUNTER
SPOKE WITH PATIENT:    Patient surgery has been scheduled 1/16/25 at 12:30pm / Nuclear 12:00pm / Arrival 10:30am / Mag seed placement - 1/8/25 1:30pm [facility]    Surgery:  Joint case - Mohan/Cash    1.  Left total mastectomy  2.  Magseed localized left axillary targeted node excision  3.  Left axillary sentinel lymph node biopsy  4.  Frozen section on the lymph nodes  5.  Possible left axillary lymph node dissection.  Right Prophylactic mastectomy  2 set ups/ 2 plasma blade/ trident / Neoprobe/ Nuclear - 23hr observation     Patient notified of date and time of surgery. Patient was instructed to enter the [facility] entrance of the hospital, park in parking lot I on the corner of [facility] and [facility].  Patient was instructed NPO after midnight the night prior to surgery.  Patient was instructed NO ASA products or blood thinners 3-5 days prior to surgery.    Patient was instructed to bring a sports bra with her day of surgery.    Preadmission testing will contact patient prior to surgery, to go over medications and any additional testing that may need to be completed.    EKG obtained    Post op visit :  2/4/25 11:30am [facility]    Prior Authorization will be obtained for surgery - Insurance [employer] / DX C50.912 / CPT [id number]

## 2025-01-15 ENCOUNTER — ANESTHESIA EVENT (OUTPATIENT)
Dept: OPERATING ROOM | Age: 65
End: 2025-01-15
Payer: COMMERCIAL

## 2025-01-16 ENCOUNTER — HOSPITAL ENCOUNTER (OUTPATIENT)
Age: 65
Setting detail: OBSERVATION
Discharge: HOME | End: 2025-01-17
Attending: PLASTIC SURGERY | Admitting: SURGERY
Payer: COMMERCIAL

## 2025-01-16 ENCOUNTER — HOSPITAL ENCOUNTER (OUTPATIENT)
Dept: GENERAL RADIOLOGY | Age: 65
Setting detail: OBSERVATION
Discharge: HOME | End: 2025-01-18
Attending: PLASTIC SURGERY
Payer: COMMERCIAL

## 2025-01-16 ENCOUNTER — HOSPITAL ENCOUNTER (OUTPATIENT)
Dept: NUCLEAR MEDICINE | Age: 65
Discharge: HOME | End: 2025-01-18
Attending: SURGERY
Payer: COMMERCIAL

## 2025-01-16 ENCOUNTER — ANESTHESIA (OUTPATIENT)
Dept: OPERATING ROOM | Age: 65
End: 2025-01-16
Payer: COMMERCIAL

## 2025-01-16 DIAGNOSIS — Z01.812 PRE-OPERATIVE LABORATORY EXAMINATION: Primary | ICD-10-CM

## 2025-01-16 DIAGNOSIS — C50.912 INVASIVE DUCTAL CARCINOMA OF BREAST, FEMALE, LEFT (HCC): ICD-10-CM

## 2025-01-16 DIAGNOSIS — C50.912 BREAST CANCER, LEFT BREAST (HCC): ICD-10-CM

## 2025-01-16 DIAGNOSIS — Z90.13 S/P BILATERAL MASTECTOMY: ICD-10-CM

## 2025-01-16 LAB
ANION GAP SERPL CALCULATED.3IONS-SCNC: 9 MMOL/L (ref 7–16)
BUN SERPL-MCNC: 29 MG/DL (ref 6–23)
CALCIUM SERPL-MCNC: 9.3 MG/DL (ref 8.6–10.2)
CHLORIDE SERPL-SCNC: 106 MMOL/L (ref 98–107)
CO2 SERPL-SCNC: 21 MMOL/L (ref 22–29)
CREAT SERPL-MCNC: 1.2 MG/DL (ref 0.5–1)
ERYTHROCYTE [DISTWIDTH] IN BLOOD BY AUTOMATED COUNT: 11.3 % (ref 11.5–15)
GFR, ESTIMATED: 50 ML/MIN/1.73M2
GLUCOSE BLD-MCNC: 115 MG/DL (ref 74–99)
GLUCOSE BLD-MCNC: 204 MG/DL (ref 74–99)
GLUCOSE SERPL-MCNC: 120 MG/DL (ref 74–99)
HCT VFR BLD AUTO: 32.4 % (ref 34–48)
HGB BLD-MCNC: 10.7 G/DL (ref 11.5–15.5)
MCH RBC QN AUTO: 31.3 PG (ref 26–35)
MCHC RBC AUTO-ENTMCNC: 33 G/DL (ref 32–34.5)
MCV RBC AUTO: 94.7 FL (ref 80–99.9)
PLATELET # BLD AUTO: 197 K/UL (ref 130–450)
PMV BLD AUTO: 10.3 FL (ref 7–12)
POTASSIUM SERPL-SCNC: 4.9 MMOL/L (ref 3.5–5)
RBC # BLD AUTO: 3.42 M/UL (ref 3.5–5.5)
SODIUM SERPL-SCNC: 136 MMOL/L (ref 132–146)
WBC OTHER # BLD: 6.2 K/UL (ref 4.5–11.5)

## 2025-01-16 PROCEDURE — 76942 ECHO GUIDE FOR BIOPSY: CPT | Performed by: ANESTHESIOLOGY

## 2025-01-16 PROCEDURE — A9520 TC99 TILMANOCEPT DIAG 0.5MCI: HCPCS | Performed by: RADIOLOGY

## 2025-01-16 PROCEDURE — G0378 HOSPITAL OBSERVATION PER HR: HCPCS

## 2025-01-16 PROCEDURE — 82962 GLUCOSE BLOOD TEST: CPT

## 2025-01-16 PROCEDURE — 3430000000 HC RX DIAGNOSTIC RADIOPHARMACEUTICAL: Performed by: RADIOLOGY

## 2025-01-16 PROCEDURE — 2500000003 HC RX 250 WO HCPCS: Performed by: PLASTIC SURGERY

## 2025-01-16 PROCEDURE — 3600000017 HC SURGERY HYBRID ADDL 15MIN: Performed by: PLASTIC SURGERY

## 2025-01-16 PROCEDURE — 19303 MAST SIMPLE COMPLETE: CPT | Performed by: SURGERY

## 2025-01-16 PROCEDURE — 38900 IO MAP OF SENT LYMPH NODE: CPT | Performed by: SURGERY

## 2025-01-16 PROCEDURE — 6360000002 HC RX W HCPCS: Performed by: ANESTHESIOLOGIST ASSISTANT

## 2025-01-16 PROCEDURE — 2580000003 HC RX 258: Performed by: PLASTIC SURGERY

## 2025-01-16 PROCEDURE — 38792 RA TRACER ID OF SENTINL NODE: CPT

## 2025-01-16 PROCEDURE — 19303 MAST SIMPLE COMPLETE: CPT | Performed by: PLASTIC SURGERY

## 2025-01-16 PROCEDURE — 2709999900 HC NON-CHARGEABLE SUPPLY: Performed by: PLASTIC SURGERY

## 2025-01-16 PROCEDURE — 3700000001 HC ADD 15 MINUTES (ANESTHESIA): Performed by: PLASTIC SURGERY

## 2025-01-16 PROCEDURE — 80048 BASIC METABOLIC PNL TOTAL CA: CPT

## 2025-01-16 PROCEDURE — 2500000003 HC RX 250 WO HCPCS: Performed by: ANESTHESIOLOGY

## 2025-01-16 PROCEDURE — 85027 COMPLETE CBC AUTOMATED: CPT

## 2025-01-16 PROCEDURE — 7100000000 HC PACU RECOVERY - FIRST 15 MIN: Performed by: PLASTIC SURGERY

## 2025-01-16 PROCEDURE — 7100000001 HC PACU RECOVERY - ADDTL 15 MIN: Performed by: PLASTIC SURGERY

## 2025-01-16 PROCEDURE — 76098 X-RAY EXAM SURGICAL SPECIMEN: CPT

## 2025-01-16 PROCEDURE — 6370000000 HC RX 637 (ALT 250 FOR IP): Performed by: STUDENT IN AN ORGANIZED HEALTH CARE EDUCATION/TRAINING PROGRAM

## 2025-01-16 PROCEDURE — 2720000010 HC SURG SUPPLY STERILE: Performed by: PLASTIC SURGERY

## 2025-01-16 PROCEDURE — 6360000002 HC RX W HCPCS: Performed by: PLASTIC SURGERY

## 2025-01-16 PROCEDURE — 6360000002 HC RX W HCPCS: Performed by: ANESTHESIOLOGY

## 2025-01-16 PROCEDURE — 3600000007 HC SURGERY HYBRID BASE: Performed by: PLASTIC SURGERY

## 2025-01-16 PROCEDURE — 2500000003 HC RX 250 WO HCPCS: Performed by: ANESTHESIOLOGIST ASSISTANT

## 2025-01-16 PROCEDURE — 6360000002 HC RX W HCPCS: Performed by: SURGERY

## 2025-01-16 PROCEDURE — 6360000002 HC RX W HCPCS

## 2025-01-16 PROCEDURE — 2500000003 HC RX 250 WO HCPCS: Performed by: STUDENT IN AN ORGANIZED HEALTH CARE EDUCATION/TRAINING PROGRAM

## 2025-01-16 PROCEDURE — 3700000000 HC ANESTHESIA ATTENDED CARE: Performed by: PLASTIC SURGERY

## 2025-01-16 PROCEDURE — 38525 BIOPSY/REMOVAL LYMPH NODES: CPT | Performed by: SURGERY

## 2025-01-16 RX ORDER — MIDAZOLAM HYDROCHLORIDE 2 MG/2ML
1 INJECTION, SOLUTION INTRAMUSCULAR; INTRAVENOUS PRN
Status: DISCONTINUED | OUTPATIENT
Start: 2025-01-16 | End: 2025-01-16 | Stop reason: HOSPADM

## 2025-01-16 RX ORDER — HYDROMORPHONE HYDROCHLORIDE 1 MG/ML
0.25 INJECTION, SOLUTION INTRAMUSCULAR; INTRAVENOUS; SUBCUTANEOUS EVERY 5 MIN PRN
Status: DISCONTINUED | OUTPATIENT
Start: 2025-01-16 | End: 2025-01-16 | Stop reason: HOSPADM

## 2025-01-16 RX ORDER — ONDANSETRON 2 MG/ML
4 INJECTION INTRAMUSCULAR; INTRAVENOUS
Status: DISCONTINUED | OUTPATIENT
Start: 2025-01-16 | End: 2025-01-16 | Stop reason: HOSPADM

## 2025-01-16 RX ORDER — SODIUM CHLORIDE 9 MG/ML
INJECTION, SOLUTION INTRAVENOUS PRN
Status: DISCONTINUED | OUTPATIENT
Start: 2025-01-16 | End: 2025-01-16 | Stop reason: HOSPADM

## 2025-01-16 RX ORDER — SODIUM CHLORIDE 0.9 % (FLUSH) 0.9 %
10 SYRINGE (ML) INJECTION PRN
Status: DISCONTINUED | OUTPATIENT
Start: 2025-01-16 | End: 2025-01-17 | Stop reason: HOSPADM

## 2025-01-16 RX ORDER — OXYCODONE HYDROCHLORIDE 5 MG/1
5 TABLET ORAL EVERY 4 HOURS PRN
Status: DISCONTINUED | OUTPATIENT
Start: 2025-01-16 | End: 2025-01-17 | Stop reason: HOSPADM

## 2025-01-16 RX ORDER — ONDANSETRON 2 MG/ML
4 INJECTION INTRAMUSCULAR; INTRAVENOUS EVERY 6 HOURS PRN
Status: DISCONTINUED | OUTPATIENT
Start: 2025-01-16 | End: 2025-01-17 | Stop reason: HOSPADM

## 2025-01-16 RX ORDER — ROPIVACAINE HYDROCHLORIDE 5 MG/ML
30 INJECTION, SOLUTION EPIDURAL; INFILTRATION; PERINEURAL ONCE
Status: DISCONTINUED | OUTPATIENT
Start: 2025-01-16 | End: 2025-01-16 | Stop reason: SDUPTHER

## 2025-01-16 RX ORDER — FENTANYL CITRATE 50 UG/ML
INJECTION, SOLUTION INTRAMUSCULAR; INTRAVENOUS
Status: COMPLETED
Start: 2025-01-16 | End: 2025-01-16

## 2025-01-16 RX ORDER — SODIUM CHLORIDE 9 MG/ML
INJECTION, SOLUTION INTRAVENOUS PRN
Status: DISCONTINUED | OUTPATIENT
Start: 2025-01-16 | End: 2025-01-17 | Stop reason: HOSPADM

## 2025-01-16 RX ORDER — OXYCODONE HYDROCHLORIDE 5 MG/1
5 TABLET ORAL EVERY 6 HOURS PRN
Qty: 20 TABLET | Refills: 0 | Status: SHIPPED | OUTPATIENT
Start: 2025-01-16 | End: 2025-01-21

## 2025-01-16 RX ORDER — POLYETHYLENE GLYCOL 3350 17 G/17G
17 POWDER, FOR SOLUTION ORAL DAILY
Status: DISCONTINUED | OUTPATIENT
Start: 2025-01-16 | End: 2025-01-17 | Stop reason: HOSPADM

## 2025-01-16 RX ORDER — SODIUM CHLORIDE 0.9 % (FLUSH) 0.9 %
5-40 SYRINGE (ML) INJECTION PRN
Status: DISCONTINUED | OUTPATIENT
Start: 2025-01-16 | End: 2025-01-16 | Stop reason: HOSPADM

## 2025-01-16 RX ORDER — FENTANYL CITRATE 50 UG/ML
INJECTION, SOLUTION INTRAMUSCULAR; INTRAVENOUS
Status: DISCONTINUED | OUTPATIENT
Start: 2025-01-16 | End: 2025-01-16 | Stop reason: SDUPTHER

## 2025-01-16 RX ORDER — FENTANYL CITRATE 50 UG/ML
50 INJECTION, SOLUTION INTRAMUSCULAR; INTRAVENOUS ONCE
Status: COMPLETED | OUTPATIENT
Start: 2025-01-16 | End: 2025-01-16

## 2025-01-16 RX ORDER — POLYETHYLENE GLYCOL 3350 17 G/17G
17 POWDER, FOR SOLUTION ORAL DAILY PRN
Qty: 5 PACKET | Refills: 0 | Status: SHIPPED | OUTPATIENT
Start: 2025-01-16 | End: 2025-01-21

## 2025-01-16 RX ORDER — MIDAZOLAM HYDROCHLORIDE 2 MG/2ML
1 INJECTION, SOLUTION INTRAMUSCULAR; INTRAVENOUS PRN
Status: DISCONTINUED | OUTPATIENT
Start: 2025-01-16 | End: 2025-01-16 | Stop reason: SDUPTHER

## 2025-01-16 RX ORDER — LIDOCAINE HYDROCHLORIDE 20 MG/ML
INJECTION, SOLUTION INTRAVENOUS
Status: DISCONTINUED | OUTPATIENT
Start: 2025-01-16 | End: 2025-01-16 | Stop reason: SDUPTHER

## 2025-01-16 RX ORDER — HYDROMORPHONE HYDROCHLORIDE 1 MG/ML
0.5 INJECTION, SOLUTION INTRAMUSCULAR; INTRAVENOUS; SUBCUTANEOUS EVERY 5 MIN PRN
Status: DISCONTINUED | OUTPATIENT
Start: 2025-01-16 | End: 2025-01-16 | Stop reason: HOSPADM

## 2025-01-16 RX ORDER — SODIUM CHLORIDE 0.9 % (FLUSH) 0.9 %
5-40 SYRINGE (ML) INJECTION EVERY 12 HOURS SCHEDULED
Status: DISCONTINUED | OUTPATIENT
Start: 2025-01-16 | End: 2025-01-16 | Stop reason: HOSPADM

## 2025-01-16 RX ORDER — SODIUM CHLORIDE, SODIUM LACTATE, POTASSIUM CHLORIDE, CALCIUM CHLORIDE 600; 310; 30; 20 MG/100ML; MG/100ML; MG/100ML; MG/100ML
INJECTION, SOLUTION INTRAVENOUS CONTINUOUS
Status: DISCONTINUED | OUTPATIENT
Start: 2025-01-16 | End: 2025-01-16 | Stop reason: HOSPADM

## 2025-01-16 RX ORDER — METHYLENE BLUE 10 MG/ML
INJECTION INTRAVENOUS PRN
Status: DISCONTINUED | OUTPATIENT
Start: 2025-01-16 | End: 2025-01-16 | Stop reason: ALTCHOICE

## 2025-01-16 RX ORDER — NALOXONE HYDROCHLORIDE 0.4 MG/ML
INJECTION, SOLUTION INTRAMUSCULAR; INTRAVENOUS; SUBCUTANEOUS PRN
Status: DISCONTINUED | OUTPATIENT
Start: 2025-01-16 | End: 2025-01-16 | Stop reason: HOSPADM

## 2025-01-16 RX ORDER — HYDROMORPHONE HYDROCHLORIDE 2 MG/ML
INJECTION, SOLUTION INTRAMUSCULAR; INTRAVENOUS; SUBCUTANEOUS
Status: DISCONTINUED | OUTPATIENT
Start: 2025-01-16 | End: 2025-01-16 | Stop reason: SDUPTHER

## 2025-01-16 RX ORDER — SODIUM CHLORIDE 9 MG/ML
INJECTION, SOLUTION INTRAMUSCULAR; INTRAVENOUS; SUBCUTANEOUS
Status: DISPENSED
Start: 2025-01-16 | End: 2025-01-16

## 2025-01-16 RX ORDER — ROPIVACAINE HYDROCHLORIDE 5 MG/ML
INJECTION, SOLUTION EPIDURAL; INFILTRATION; PERINEURAL
Status: COMPLETED | OUTPATIENT
Start: 2025-01-16 | End: 2025-01-16

## 2025-01-16 RX ORDER — ACETAMINOPHEN 325 MG/1
650 TABLET ORAL EVERY 6 HOURS
Status: DISCONTINUED | OUTPATIENT
Start: 2025-01-16 | End: 2025-01-17 | Stop reason: HOSPADM

## 2025-01-16 RX ORDER — DEXAMETHASONE SODIUM PHOSPHATE 10 MG/ML
4 INJECTION, EMULSION INTRAMUSCULAR; INTRAVENOUS ONCE
Status: DISCONTINUED | OUTPATIENT
Start: 2025-01-16 | End: 2025-01-16 | Stop reason: SDUPTHER

## 2025-01-16 RX ORDER — PROPOFOL 10 MG/ML
INJECTION, EMULSION INTRAVENOUS
Status: DISCONTINUED | OUTPATIENT
Start: 2025-01-16 | End: 2025-01-16 | Stop reason: SDUPTHER

## 2025-01-16 RX ORDER — ROPIVACAINE HYDROCHLORIDE 5 MG/ML
30 INJECTION, SOLUTION EPIDURAL; INFILTRATION; PERINEURAL ONCE
Status: DISCONTINUED | OUTPATIENT
Start: 2025-01-16 | End: 2025-01-16 | Stop reason: HOSPADM

## 2025-01-16 RX ORDER — ONDANSETRON 2 MG/ML
INJECTION INTRAMUSCULAR; INTRAVENOUS
Status: DISCONTINUED | OUTPATIENT
Start: 2025-01-16 | End: 2025-01-16 | Stop reason: SDUPTHER

## 2025-01-16 RX ORDER — MEPERIDINE HYDROCHLORIDE 25 MG/ML
12.5 INJECTION INTRAMUSCULAR; INTRAVENOUS; SUBCUTANEOUS
Status: DISCONTINUED | OUTPATIENT
Start: 2025-01-16 | End: 2025-01-16 | Stop reason: HOSPADM

## 2025-01-16 RX ORDER — LEVOTHYROXINE SODIUM 25 UG/1
25 TABLET ORAL DAILY
Status: DISCONTINUED | OUTPATIENT
Start: 2025-01-17 | End: 2025-01-17 | Stop reason: HOSPADM

## 2025-01-16 RX ORDER — SODIUM CHLORIDE 9 MG/ML
INJECTION, SOLUTION INTRAVENOUS CONTINUOUS
Status: DISCONTINUED | OUTPATIENT
Start: 2025-01-16 | End: 2025-01-16 | Stop reason: HOSPADM

## 2025-01-16 RX ORDER — DEXAMETHASONE SODIUM PHOSPHATE 10 MG/ML
4 INJECTION, SOLUTION INTRAMUSCULAR; INTRAVENOUS ONCE
Status: DISCONTINUED | OUTPATIENT
Start: 2025-01-16 | End: 2025-01-16 | Stop reason: HOSPADM

## 2025-01-16 RX ORDER — ROCURONIUM BROMIDE 10 MG/ML
INJECTION, SOLUTION INTRAVENOUS
Status: DISCONTINUED | OUTPATIENT
Start: 2025-01-16 | End: 2025-01-16 | Stop reason: SDUPTHER

## 2025-01-16 RX ORDER — DEXAMETHASONE SODIUM PHOSPHATE 10 MG/ML
INJECTION, SOLUTION INTRAMUSCULAR; INTRAVENOUS
Status: COMPLETED | OUTPATIENT
Start: 2025-01-16 | End: 2025-01-16

## 2025-01-16 RX ORDER — SODIUM CHLORIDE 0.9 % (FLUSH) 0.9 %
10 SYRINGE (ML) INJECTION EVERY 12 HOURS SCHEDULED
Status: DISCONTINUED | OUTPATIENT
Start: 2025-01-16 | End: 2025-01-17 | Stop reason: HOSPADM

## 2025-01-16 RX ADMIN — ACETAMINOPHEN 650 MG: 325 TABLET ORAL at 16:43

## 2025-01-16 RX ADMIN — SUGAMMADEX 160 MG: 100 INJECTION, SOLUTION INTRAVENOUS at 13:19

## 2025-01-16 RX ADMIN — PROPOFOL 120 MG: 10 INJECTION, EMULSION INTRAVENOUS at 10:47

## 2025-01-16 RX ADMIN — OXYCODONE 5 MG: 5 TABLET ORAL at 16:42

## 2025-01-16 RX ADMIN — ROPIVACAINE HYDROCHLORIDE 30 ML: 5 INJECTION EPIDURAL; INFILTRATION; PERINEURAL at 09:52

## 2025-01-16 RX ADMIN — OXYCODONE 5 MG: 5 TABLET ORAL at 22:04

## 2025-01-16 RX ADMIN — DEXAMETHASONE SODIUM PHOSPHATE 4 MG: 10 INJECTION, SOLUTION INTRAMUSCULAR; INTRAVENOUS at 09:52

## 2025-01-16 RX ADMIN — HYDROMORPHONE HYDROCHLORIDE 0.2 MG: 2 INJECTION, SOLUTION INTRAMUSCULAR; INTRAVENOUS; SUBCUTANEOUS at 13:15

## 2025-01-16 RX ADMIN — ACETAMINOPHEN 650 MG: 325 TABLET ORAL at 22:04

## 2025-01-16 RX ADMIN — HYDROMORPHONE HYDROCHLORIDE 0.5 MG: 1 INJECTION, SOLUTION INTRAMUSCULAR; INTRAVENOUS; SUBCUTANEOUS at 14:41

## 2025-01-16 RX ADMIN — HYDROMORPHONE HYDROCHLORIDE 0.2 MG: 2 INJECTION, SOLUTION INTRAMUSCULAR; INTRAVENOUS; SUBCUTANEOUS at 12:56

## 2025-01-16 RX ADMIN — ROCURONIUM BROMIDE 50 MG: 10 INJECTION, SOLUTION INTRAVENOUS at 10:47

## 2025-01-16 RX ADMIN — FENTANYL CITRATE 50 MCG: 50 INJECTION, SOLUTION INTRAMUSCULAR; INTRAVENOUS at 10:47

## 2025-01-16 RX ADMIN — CEFAZOLIN 2000 MG: 2 INJECTION, POWDER, FOR SOLUTION INTRAMUSCULAR; INTRAVENOUS at 10:55

## 2025-01-16 RX ADMIN — SODIUM CHLORIDE: 9 INJECTION, SOLUTION INTRAVENOUS at 09:19

## 2025-01-16 RX ADMIN — ONDANSETRON HYDROCHLORIDE 4 MG: 2 SOLUTION INTRAMUSCULAR; INTRAVENOUS at 13:12

## 2025-01-16 RX ADMIN — FENTANYL CITRATE 50 MCG: 50 INJECTION INTRAMUSCULAR; INTRAVENOUS at 09:52

## 2025-01-16 RX ADMIN — DEXAMETHASONE SODIUM PHOSPHATE 10 MG: 10 INJECTION, SOLUTION INTRAMUSCULAR; INTRAVENOUS at 10:47

## 2025-01-16 RX ADMIN — SODIUM CHLORIDE, PRESERVATIVE FREE 10 ML: 5 INJECTION INTRAVENOUS at 22:03

## 2025-01-16 RX ADMIN — HYDROMORPHONE HYDROCHLORIDE 0.5 MG: 1 INJECTION, SOLUTION INTRAMUSCULAR; INTRAVENOUS; SUBCUTANEOUS at 14:03

## 2025-01-16 RX ADMIN — FENTANYL CITRATE 50 MCG: 50 INJECTION, SOLUTION INTRAMUSCULAR; INTRAVENOUS at 09:52

## 2025-01-16 RX ADMIN — HYDROMORPHONE HYDROCHLORIDE 0.2 MG: 2 INJECTION, SOLUTION INTRAMUSCULAR; INTRAVENOUS; SUBCUTANEOUS at 12:47

## 2025-01-16 RX ADMIN — TILMANOCEPT 1 MILLICURIE: KIT at 11:00

## 2025-01-16 RX ADMIN — FENTANYL CITRATE 50 MCG: 50 INJECTION, SOLUTION INTRAMUSCULAR; INTRAVENOUS at 11:14

## 2025-01-16 RX ADMIN — LIDOCAINE HYDROCHLORIDE 100 MG: 20 INJECTION, SOLUTION INTRAVENOUS at 10:47

## 2025-01-16 RX ADMIN — ROCURONIUM BROMIDE 20 MG: 10 INJECTION, SOLUTION INTRAVENOUS at 11:29

## 2025-01-16 ASSESSMENT — PAIN DESCRIPTION - ORIENTATION
ORIENTATION: RIGHT;LEFT

## 2025-01-16 ASSESSMENT — PAIN - FUNCTIONAL ASSESSMENT
PAIN_FUNCTIONAL_ASSESSMENT: ACTIVITIES ARE NOT PREVENTED
PAIN_FUNCTIONAL_ASSESSMENT: NONE - DENIES PAIN
PAIN_FUNCTIONAL_ASSESSMENT: ACTIVITIES ARE NOT PREVENTED

## 2025-01-16 ASSESSMENT — PAIN DESCRIPTION - DESCRIPTORS
DESCRIPTORS: ACHING;DISCOMFORT
DESCRIPTORS: BURNING;ACHING;DISCOMFORT
DESCRIPTORS: ACHING;DISCOMFORT
DESCRIPTORS: ACHING;DISCOMFORT;SORE
DESCRIPTORS: ACHING;BURNING

## 2025-01-16 ASSESSMENT — PAIN DESCRIPTION - FREQUENCY: FREQUENCY: CONTINUOUS

## 2025-01-16 ASSESSMENT — PAIN SCALES - GENERAL
PAINLEVEL_OUTOF10: 8
PAINLEVEL_OUTOF10: 4
PAINLEVEL_OUTOF10: 10
PAINLEVEL_OUTOF10: 5
PAINLEVEL_OUTOF10: 4
PAINLEVEL_OUTOF10: 8
PAINLEVEL_OUTOF10: 8

## 2025-01-16 ASSESSMENT — PAIN DESCRIPTION - ONSET: ONSET: ON-GOING

## 2025-01-16 ASSESSMENT — PAIN DESCRIPTION - PAIN TYPE: TYPE: SURGICAL PAIN

## 2025-01-16 ASSESSMENT — PAIN DESCRIPTION - LOCATION
LOCATION: BREAST

## 2025-01-16 NOTE — ANESTHESIA POSTPROCEDURE EVALUATION
Department of Anesthesiology  Postprocedure Note    Patient: Zuri Boggs  MRN: [id number]  YOB: 1960  Date of evaluation: 1/16/2025    Procedure Summary       Date: 01/16/25 Room / Location: [place] [address] / [facility]    Anesthesia Start: 1040 Anesthesia Stop: 1332    Procedures:       Right Prophylactic Mastectomy (Right: Breast)      LEFT TOTAL MASTECTOMY/ MAGSEED LOCALIZED AXILLARY TARGETED EXCISION/ LEFT AXILLARY SENTINEL LYMPH NODE BIOPSY/ FROZEN SECTION LYMPH NODES (Left: Breast) Diagnosis:       Breast cancer, left breast (HCC)      (Breast cancer, left breast (HCC) [C50.912])    Surgeons: Jose Alfredo Araiza MD; Ryan Preston MD Responsible Provider: Power Dick DO    Anesthesia Type: general ASA Status: 3            Anesthesia Type: No value filed.    Mary Beth Phase I: Mary Beth Score: 8    Mary Beth Phase II:      Anesthesia Post Evaluation    Patient location during evaluation: PACU  Patient participation: complete - patient participated  Level of consciousness: awake and alert  Airway patency: patent  Nausea & Vomiting: no nausea and no vomiting  Cardiovascular status: blood pressure returned to baseline  Respiratory status: acceptable  Hydration status: euvolemic  Multimodal analgesia pain management approach  Pain management: adequate    No notable events documented.

## 2025-01-16 NOTE — INTERVAL H&P NOTE
Update History & Physical    The patient's History and Physical was reviewed with the patient and I examined the patient. There was no change. The surgical site was confirmed by the patient and me.     Plan: The risks, benefits, expected outcome, and alternative to the recommended procedure have been discussed with the patient. Patient understands and wants to proceed with the procedure.     Electronically signed by Ryan Preston MD on 1/16/2025 at 9:07 AM

## 2025-01-16 NOTE — OP NOTE
Operative Note      Patient: Zuri Boggs  YOB: 1960  MRN: [id number]    Date of Procedure: 1/16/2025    Pre-Op Diagnosis Codes:      * Breast cancer, left breast (HCC) [C50.912]    Post-Op Diagnosis: Same       Procedure(s):  Right Prophylactic Mastectomy  LEFT TOTAL MASTECTOMY/ MAGSEED LOCALIZED AXILLARY TARGETED EXCISION/ LEFT AXILLARY SENTINEL LYMPH NODE BIOPSY/ FROZEN SECTION LYMPH NODES    Surgeon(s):  Jose Alfredo Araiza MD Keenan, Donald M, MD    Assistant:   Physician Assistant: Fili South PA  Resident: Nalini Juarez MD    Anesthesia: General    Estimated Blood Loss (mL): less than 100     Complications: None    Specimens:   ID Type Source Tests Collected by Time Destination   A : RIGHT BREAST TOTAL MASTECTOMY.SHORT SUPERIOR.LONG LATERAL Tissue Breast SURGICAL PATHOLOGY Jose Alfredo Araiza MD 1/16/2025 1131    B : LEFT BREAST TOTAL MASTECTOMY.SHORT SUPERIOR.LONG LATERAL Tissue Breast SURGICAL PATHOLOGY Jose Alfredo Araiza MD 1/16/2025 1201    C : LEFT AXILLARY TARGETED NODE Tissue Tissue SURGICAL PATHOLOGY Ryan Preston MD 1/16/2025 1234    D : LEFT AXILLARY SENTINEL NODE #1. NO BLUE. COUNT-42 Tissue Tissue SURGICAL PATHOLOGY Ryan Preston MD 1/16/2025 1239        Implants:  * No implants in log *      Drains:   Closed/Suction Drain Inferior;Lateral;Right Breast Bulb (Active)       Closed/Suction Drain Lateral;Left Breast Bulb (Active)       Closed/Suction Drain Inferior;Left Breast Bulb (Active)       Findings:  Infection Present At Time Of Surgery (PATOS) (choose all levels that have infection present):  No infection present    Detailed Description of Procedure:   Procedure note:  1.  Intraoperative injection of left breast with radionucleotide for sentinel lymph node mapping  2.  Intraoperative injection of the left breast with blue dye for sentinel lymph node mapping  3.  Left total mastectomy  4.  Left targeted axillary node excision  5.  Left axillary sentinel

## 2025-01-16 NOTE — ANESTHESIA PRE PROCEDURE
involved discussed with patient.  Patient verbalized an understanding and agrees to proceed.  Plan discussed with care team members and agreed upon.    Power Dick DO  Staff Anesthesiologist  9:07 AM    Power Dick DO   1/16/2025

## 2025-01-16 NOTE — ANESTHESIA PROCEDURE NOTES
Peripheral Block    Patient location during procedure: pre-op  Reason for block: post-op pain management  Start time: 1/16/2025 9:52 AM  End time: 1/16/2025 9:59 AM  Staffing  Performed: anesthesiologist   Anesthesiologist: Deepali Reyes MD  Performed by: Deepali Reyes MD  Authorized by: Deepali Reyes MD    Preanesthetic Checklist  Completed: patient identified, IV checked, site marked, risks and benefits discussed, surgical/procedural consents, equipment checked, pre-op evaluation, timeout performed, anesthesia consent given, oxygen available, monitors applied/VS acknowledged, fire risk safety assessment completed and verbalized and blood product R/B/A discussed and consented  Peripheral Block   Patient position: supine  Prep: ChloraPrep  Provider prep: mask and sterile gloves  Patient monitoring: cardiac monitor, continuous pulse ox, continuous capnometry, frequent blood pressure checks, IV access, oxygen and responsive to questions  Block type: PECS I and PECS II  Laterality: bilateral  Injection technique: single-shot  Guidance: ultrasound guided    Needle   Needle type: insulated echogenic nerve stimulator needle   Needle gauge: 22 G  Needle localization: anatomical landmarks and ultrasound guidance  Needle length: 8 cm  Assessment   Injection assessment: negative aspiration for heme, no paresthesia on injection, local visualized surrounding nerve on ultrasound and no intravascular symptoms  Paresthesia pain: immediately resolved  Slow fractionated injection: yes  Hemodynamics: stable  Outcomes: uncomplicated and patient tolerated procedure well    Additional Notes  Time out performed.  Total volume diluted with PF saline to 60 ml.  Well tolerated.  Medications Administered  dexAMETHasone (DECADRON) (PF) 10 mg/mL injection - Other   4 mg - 1/16/2025 9:52:00 AM  ropivacaine (NAROPIN) injection 0.5% - Perineural   30 mL - 1/16/2025 9:52:00 AM

## 2025-01-16 NOTE — DISCHARGE SUMMARY
Physician Discharge Summary     Patient ID:  Zuri Boggs  [id number]  64 y.o.  1960    Admit date: 1/16/2025    Discharge date and time: 01/17/25 6:12 AM     Admitting Physician: Ryan Preston MD     Admission Diagnoses: Breast cancer, left breast (HCC) [C50.912]  Malignant neoplasm of left female breast, unspecified estrogen receptor status, unspecified site of breast (HCC) [C50.912]  S/P bilateral mastectomy [Z90.13]    Discharge Diagnoses: Principal Problem:    Breast cancer, left breast (HCC)  Active Problems:    Malignant neoplasm of left female breast, unspecified estrogen receptor status, unspecified site of breast (HCC)    Pre-operative laboratory examination    S/P bilateral mastectomy  Resolved Problems:    * No resolved hospital problems. *    Admission Condition: good    Discharged Condition: stable    Hospital Course: Zuri Boggs is a 64 y.o. female who presented for left total mastectomy with axillary sentinel lymph node biopsy and right prophylactic mastectomy secondary to T2N1 ER-/TN-/HER+ left invasive ductal carcinoma. She was admitted post-operatively for observation. The patient's course was otherwise uneventful. She progressed well, pain was controlled on PO medications. She was tolerating a regular diet with no nausea or vomiting, and was in a suitable condition for discharge to home in stable condition.    Consults:   IP CONSULT TO HOME CARE NEEDS    Significant Diagnostic Studies:   SOHA SURG SPECIMEN LEFT    Result Date: 1/16/2025  EXAMINATION: SPECIMEN RADIOGRAPH 1/16/2025 TECHNIQUE: Single radiograph of the surgical specimen was obtained intraoperatively. COMPARISON: None HISTORY: Specimen radiograph post lumpectomy. ORDERING SYSTEM PROVIDED HISTORY: Invasive ductal carcinoma of breast, female, left (HCC) TECHNOLOGIST PROVIDED HISTORY: LEFT BREAST Reason for exam:->SURGICAL SPECIMEN -- BREAST CANCER FINDINGS: A single specimen radiograph is submitted intraoperatively.  Images  you hands before you change the dressing or empty the drain. This is important to prevent infection.  Change the dressing to the drain tube site daily.  Apply a small amount of antibiotic ointment to the skin at the drain tube site with each dressing change.    How do I empty the drain?  Hold the plastic bulb in an upright position with one hand and take the cap off with the other hand.  With the bulb in one hand and the cup in the other, turn the bulb over and place the end into the cup.  Squeeze gently and empty the fluid into the cup.  Squeeze the bulb tightly with one hand (to flatten it as much as possible) and recap it with the other hand.  This starts the suction again inside of the bulb.  Do not squeeze the bulb if the cap is on.  Record the amount of drainage if your physician has asked you to do so.  Discard the drainage into the toilet.  Rinse the cup so it is clean and ready to be used again the next time.  Wash your hands.  Re-pin the tape tab of the drain tube back to your clothing.  The bulb should always be lower than your incision.  This will prevent drainage from flowing back into the tubing and the incision.    How often do I need to empty my drain?  Usually you empty the drain when it is half full.  Most find they need to empty the drain 3 times a day- when you wake up in the morning, mid day, and before bed.  If the bulb fills up more than this, you may need to empty the drain more often.    How much drainage should there be?  The amount of drainage may vary from day to day.  It should be less each day.  If you increase your activity, it may increase the amount of drainage, temporarily.    What color should the drainage be?  The color will vary.  It may go from bright red to pink, then to clear yellow.    What do I do with my drainage record?  Take it to your follow up visit with your surgeon.    May I shower?  Yes, 48 hours post-operatively.  It may help to secure the drain to an old cloth/robe

## 2025-01-16 NOTE — PROGRESS NOTES
INTRAOPERATIVE CONSULTATION (with FROZEN SECTION)    C. Left axillary targeted node: Negative for carcinoma.     D. Left axillary sentinel node #1: Negative for carcinoma

## 2025-01-16 NOTE — OP NOTE
Operative Note      Patient: Zuri Boggs  YOB: 1960  MRN: [id number]    Date of Procedure: 1/16/2025    Pre-Op Diagnosis Codes:      * Breast cancer, left breast (HCC) [C50.912]    Post-Op Diagnosis: Same       Procedure(s):  Right Prophylactic Mastectomy      Surgeon(s):  Jose Alfredo Araiza MD      Assistant:   Physician Assistant: Fili South PA      Anesthesia: General    Estimated Blood Loss (mL): 50cc    Complications: None    Specimens:   ID Type Source Tests Collected by Time Destination   A : RIGHT BREAST TOTAL MASTECTOMY.SHORT SUPERIOR.LONG LATERAL Tissue Breast SURGICAL PATHOLOGY Jose Alfredo Araiza MD 1/16/2025 1131        Implants:  * No implants in log *      Drains:   Closed/Suction Drain Inferior;Lateral;Right Breast Bulb (Active)       Findings:  Infection Present At Time Of Surgery (PATOS) (choose all levels that have infection present):  No infection present        Detailed Description of Procedure:              ASSISTANT:  Fili South PA-C.  PA was required for the case due  to lack of adequately trained assistant; was involved in prepping,  draping, retracting, dressing and suturing.      SPECIMEN:  Right breast skin and tissue.           PREOPERATIVE INDICATIONS:  The patient is a 64 y.o. female with history of Left Breast Cancer . The patient elected to proceed with bilateral mastectomies without reconstruction.    Risks, benefits, and alternatives were explained to her including but not limited to bleeding, scarring, infection, asymmetries,  anesthesia related complications, death, and need for further surgery.  She understood and elected to proceed.  She understands the nipple and areola would be removed. She was seen the day of surgery, marked, and all questions were answered.     OPERATIVE PROCEDURE:  She was taken back to the operating room, placed in the supine position.  SCDs were on and functioning at the time of induction of anesthesia.  Preoperative

## 2025-01-16 NOTE — DISCHARGE INSTRUCTIONS
Discharge Home.   Follow up in clinic with Dr. Preston in 2 weeks for post-op check.   Call office to schedule a follow-up appointment at [phone] with Dr. Araiza.  Please call to schedule an appointment to be seen In our office on Tuesday 1-28-25      Diet: regular diet  Activity: ambulate in house and no Strenuous exercise for 1 week    Shower/Bathing:  You can shower in 48 hours over the incision site.  At that time you can allow soap and water to rinse off the incision site while showering.  Once you are done in the shower you can pat dry the incision site with a clean paper towel.  No baths, hot tubs or soaking of the wound site at this time.     Dressings /Splint /Wound Care:Keep wound clean and dry.  There is no need to remove your steri strips.  Your surgical bra needs to be worn at all times.  Your bra may become saturated with drainage this is to be expected.  The bra can be off for period of time to have it washed and dried.  You can use gauze padding as needed for comfort under the surgical bra.  This gauze dressing can be changed as needed if the gauze becomes saturated.  At any point during the day if you notice any sudden changes to the appearance of the wound or the site operated on you will need to contact our office.  This may include opening of wound, pus, changes in size, color etc.      Drain Care  What is the drain for?  Your drain is to stop fluid from building up under the skin.  It also helps your incision to heal.  Your doctor will take the drain out when there is less and less fluid coming out.    What supplies do I need?  A cup for the fluid to be emptied into and to measure the fluid.  The chart to record the amount of fluid (if your surgeon tells you to do so).  4” x 4” gauze  Tape   Safety pin  Antibiotic ointment (available over the counter)    Drain care:  Wash you hands before you change the dressing or empty the drain. This is important to prevent infection.  Change the dressing

## 2025-01-17 VITALS
RESPIRATION RATE: 16 BRPM | DIASTOLIC BLOOD PRESSURE: 60 MMHG | TEMPERATURE: 97.7 F | WEIGHT: 170 LBS | OXYGEN SATURATION: 96 % | BODY MASS INDEX: 33.38 KG/M2 | HEART RATE: 91 BPM | HEIGHT: 60 IN | SYSTOLIC BLOOD PRESSURE: 101 MMHG

## 2025-01-17 PROCEDURE — G0378 HOSPITAL OBSERVATION PER HR: HCPCS

## 2025-01-17 PROCEDURE — 6370000000 HC RX 637 (ALT 250 FOR IP): Performed by: STUDENT IN AN ORGANIZED HEALTH CARE EDUCATION/TRAINING PROGRAM

## 2025-01-17 RX ADMIN — ACETAMINOPHEN 650 MG: 325 TABLET ORAL at 05:48

## 2025-01-17 RX ADMIN — LEVOTHYROXINE SODIUM 25 MCG: 0.03 TABLET ORAL at 05:48

## 2025-01-17 RX ADMIN — OXYCODONE 5 MG: 5 TABLET ORAL at 05:48

## 2025-01-17 ASSESSMENT — PAIN DESCRIPTION - FREQUENCY: FREQUENCY: CONTINUOUS

## 2025-01-17 ASSESSMENT — PAIN DESCRIPTION - ORIENTATION: ORIENTATION: RIGHT;LEFT

## 2025-01-17 ASSESSMENT — PAIN DESCRIPTION - LOCATION: LOCATION: BREAST

## 2025-01-17 ASSESSMENT — PAIN DESCRIPTION - ONSET: ONSET: ON-GOING

## 2025-01-17 ASSESSMENT — PAIN SCALES - GENERAL: PAINLEVEL_OUTOF10: 7

## 2025-01-17 ASSESSMENT — PAIN DESCRIPTION - PAIN TYPE: TYPE: SURGICAL PAIN

## 2025-01-17 ASSESSMENT — PAIN DESCRIPTION - DESCRIPTORS: DESCRIPTORS: ACHING;DISCOMFORT;SORE

## 2025-01-17 ASSESSMENT — PAIN - FUNCTIONAL ASSESSMENT: PAIN_FUNCTIONAL_ASSESSMENT: ACTIVITIES ARE NOT PREVENTED

## 2025-01-17 NOTE — PROGRESS NOTES
Discharge instructions reviewed with patient and family. Demonstrated drain and incision care with repeat demonstration from family member. Gauze and containers to measure drainage sent home. Patient placed in transport.

## 2025-01-17 NOTE — PROGRESS NOTES
4 Eyes Skin Assessment     NAME:  Zuri Boggs  YOB: 1960  MEDICAL RECORD NUMBER:  [id number]    The patient is being assessed for  Admission    I agree that at least one RN has performed a thorough Head to Toe Skin Assessment on the patient. ALL assessment sites listed below have been assessed.      Areas assessed by both nurses:    Head, Face, Ears, Shoulders, Back, Chest, Arms, Elbows, Hands, Sacrum. Buttock, Coccyx, Ischium, Legs. Feet and Heels, and Under Medical Devices         Does the Patient have a Wound? Yes wound(s) were present on assessment. LDA wound assessment was Initiated and completed by RN       Caio Prevention initiated by RN: No  Wound Care Orders initiated by RN: Yes    Pressure Injury (Stage 3,4, Unstageable, DTI, NWPT, and Complex wounds) if present, place Wound referral order by RN under : No    New Ostomies, if present place, Ostomy referral order under : No     Nurse 1 eSignature: Electronically signed by JORGE LUIS DURAN RN on 1/17/25 at 9:39 AM EST    **SHARE this note so that the co-signing nurse can place an eSignature**    Nurse 2 eSignature: {Esignature:[id number]}

## 2025-01-17 NOTE — CARE COORDINATION
1/17/25, Discharge Acknowledged.  SW went to meet with patient and family member in room who was getting ready to leave hospital.  Patient has no needs at this time.  Family will be assisting patient with drain care at home and will not need HHC.  Per RN patient and family member were taught drain care.  No needs identified at this time.  SW to follow.      Case Management Assessment  Initial Evaluation    Date/Time of Evaluation: 1/17/2025 10:43 AM  Assessment Completed by: RUFINO Patricio    If patient is discharged prior to next notation, then this note serves as note for discharge by case management.    Patient Name: Zuri Boggs                   YOB: 1960  Diagnosis: Breast cancer, left breast (HCC) [C50.912]  Malignant neoplasm of left female breast, unspecified estrogen receptor status, unspecified site of breast (HCC) [C50.912]  S/P bilateral mastectomy [Z90.13]                   Date / Time: 1/16/2025  8:49 AM    Patient Admission Status: Observation   Readmission Risk (Low < 19, Mod (19-27), High > 27): Readmission Risk Score: 19.7    Current PCP: Winter Sarkar MD  PCP verified by CM? Yes    Chart Reviewed: Yes      History Provided by: Patient  Patient Orientation: Alert and Oriented    Patient Cognition: Alert    Hospitalization in the last 30 days (Readmission):  No    If yes, Readmission Assessment in  Navigator will be completed.    Advance Directives:      Code Status: Full Code   Patient's Primary Decision Maker is:      Primary Decision Maker: DarleenandreJuliano - Spouse - [phone]    Secondary Decision Maker: Kenroy Boggs - Child - [phone]    Discharge Planning:    Patient lives with: Spouse/Significant Other Type of Home: House  Primary Care Giver: Self  Patient Support Systems include: Spouse/Significant Other, Family Members   Current Financial resources:    Current community resources:    Current services prior to admission: None            Current DME:

## 2025-01-17 NOTE — PROGRESS NOTES
CLINICAL PHARMACY NOTE: MEDS TO BEDS    Total # of Prescriptions Filled: 2   The following medications were delivered to the patient:  Miralax #[id number]  Oxycodone 5 mg    Additional Documentation:   Family member picked up in pharmacy

## 2025-01-17 NOTE — PLAN OF CARE
Problem: Chronic Conditions and Co-morbidities  Goal: Patient's chronic conditions and co-morbidity symptoms are monitored and maintained or improved  1/17/2025 0111 by Bonnie Elder RN  Outcome: Progressing  1/16/2025 1901 by Yue Davis RN  Outcome: Progressing     Problem: Discharge Planning  Goal: Discharge to home or other facility with appropriate resources  1/17/2025 0111 by Bonnie Elder RN  Outcome: Progressing  1/16/2025 1901 by Yue Davis RN  Outcome: Progressing  Flowsheets (Taken 1/16/2025 1717)  Discharge to home or other facility with appropriate resources:   Identify discharge learning needs (meds, wound care, etc)   Arrange for needed discharge resources and transportation as appropriate     Problem: Safety - Adult  Goal: Free from fall injury  1/17/2025 0111 by Bonnie Elder RN  Outcome: Progressing  1/16/2025 1901 by Yue Davis RN  Outcome: Progressing     Problem: ABCDS Injury Assessment  Goal: Absence of physical injury  1/17/2025 0111 by Bonnie Elder RN  Outcome: Progressing  1/16/2025 1901 by Yue Davis RN  Outcome: Progressing     Problem: Skin/Tissue Integrity - Adult  Goal: Incisions, wounds, or drain sites healing without S/S of infection  1/17/2025 0111 by Bonnie Elder RN  Outcome: Progressing  1/16/2025 1901 by Yue Davis RN  Outcome: Progressing     Problem: Infection - Adult  Goal: Absence of infection at discharge  1/17/2025 0111 by Bonnie Elder RN  Outcome: Progressing  1/16/2025 1901 by Yue Davis RN  Outcome: Progressing  Goal: Absence of infection during hospitalization  1/17/2025 0111 by Bonnie Elder RN  Outcome: Progressing  1/16/2025 1901 by Yue Davis RN  Outcome: Progressing     Problem: Metabolic/Fluid and Electrolytes - Adult  Goal: Electrolytes maintained within normal limits  1/17/2025 0111 by Bonnie Elder RN  Outcome: Progressing  1/16/2025 1901 by Yue Davis RN  Outcome: Progressing

## 2025-01-21 ENCOUNTER — TELEPHONE (OUTPATIENT)
Dept: BREAST CENTER | Age: 65
End: 2025-01-21

## 2025-01-21 NOTE — TELEPHONE ENCOUNTER
Patient and family/friend (Kristal) called to let us know her current drain output.     Drain #1 has been under 40cc for two days in a row as of today.  Drain #2 was 43cc (Sunday January 19th) and under 40 yesterday.  Drain #3 was 125cc on the 19th and 115cc from yesterday morning through today.    They will continue to watch her drains closely. Today is Day 5. They are aware to call us once each drain is 40cc or less in 24 hours for two days in a row.

## 2025-01-21 NOTE — TELEPHONE ENCOUNTER
Received call from patient that surgical bra is rubbing and causing a rash across the bottom and affecting her area below the surgical site. Patient had bilateral mastectomy. After speaking with Dr dobson, told patient it was ok to obtain a large ace 6 inches to use for mild compression and to pin the drains directly to the ace. Patient appreciative for call.    Electronically signed by Chuyita Sevilla RN on 1/21/25 at 1:18 PM EST

## 2025-01-24 ENCOUNTER — OFFICE VISIT (OUTPATIENT)
Dept: BREAST CENTER | Age: 65
End: 2025-01-24

## 2025-01-24 ENCOUNTER — TELEPHONE (OUTPATIENT)
Dept: BREAST CENTER | Age: 65
End: 2025-01-24

## 2025-01-24 VITALS
DIASTOLIC BLOOD PRESSURE: 71 MMHG | BODY MASS INDEX: 33.38 KG/M2 | SYSTOLIC BLOOD PRESSURE: 117 MMHG | HEIGHT: 60 IN | TEMPERATURE: 99.7 F | RESPIRATION RATE: 14 BRPM | HEART RATE: 110 BPM | WEIGHT: 170 LBS

## 2025-01-24 DIAGNOSIS — C50.912 INVASIVE DUCTAL CARCINOMA OF BREAST, FEMALE, LEFT (HCC): Primary | ICD-10-CM

## 2025-01-24 LAB — SURGICAL PATHOLOGY REPORT: NORMAL

## 2025-01-24 PROCEDURE — 99024 POSTOP FOLLOW-UP VISIT: CPT | Performed by: SURGERY

## 2025-01-24 NOTE — TELEPHONE ENCOUNTER
Patient called to discuss drain outputs for last 48 hours. Left drain #1 10 cc- 14 of-[id number] last 4 days. Drain #2 20cc- 10cc 10cc-10cc last 4 days. Patient will come later today for drain removal.    Electronically signed by Chuyita Sevilla RN on 1/24/25 at 10:36 AM EST

## 2025-01-24 NOTE — PROGRESS NOTES
Date of visit: 1/24/2025 05/07/24 09/17/24: Not seen  09/24/24 11/05/24: End chemo  03/03/25: Post op    DIAGNOSIS:  1. (05/07/24) LEFT (UOQ, palp) invasive ductal carcinoma  Clinical stage: T2N1(bx-clip)M0  ER (- ) LA (-) HER2 (3+)  2. Recent lap for SBO    TREATMENT  1. Neoadjuvant TCHP  2. (01/16/25) BILATERAL mastectomies, LEFT targeted node, LEFT SLNB    IMAGING/PROCEDURES:  1. (04/02/24) BILATERAL d-mammogram and US ([facility]): BIRADS-5  * LEFT (2:00-3:00, palp) 4.3cm mass  * LEFT mammographic skin thickening and nipple retraction  2. (04/16/24) LEFT US core biopsy ([facility])  3. (05/16/24) MRI  4. (05/16/24) LEFT axillary US biopsy  5. (05/16/24) MRI: BIRADS-6  * LEFT 4.6cm mass with enhancement to nipple  * LEFT axilla abnormal node with biopsy artifact  * LEFT breast skin thickening and enhancement  6. (05/21/24) CT CAP  * ? Small lung nodules  * ? T8/T9 lesions  * ? Tibial lesion  7. (05/21/24) Bone scan  * No activity T8T9  * ? LEFT hemipelvis lesion  8. (09/05/24) MRI: BIRADS-6  * Partial response in breast  * Axillary nodes resolved    Breast History  Zuri Boggs was in the office for a postoperative visit.    Zuri recently underwent an uneventful bilateral mastectomy, left axillary targeted excision and left axillary sentinel lymph node biopsy.    Breast Symptoms  Zuri has no significant discomfort or disability.    Breast Examination  The incisions are healing nicely with no evidence of infection hematoma or seroma.    Pathology  I discussed the surgical pathology report with Zuri.  As expected there were no findings in the right breast.  Left breast had some remaining ductal carcinoma in situ including the nipple.  Surgical margins were negative.  Lastly, there is no residual cancer in the targeted node and no cancer in the sentinel node.    Assessment/Plan  Zuri Boggs has completed the surgical management of her HER2 positive breast cancer after neoadjuvant therapies.    I informed Zuri

## 2025-01-28 ENCOUNTER — OFFICE VISIT (OUTPATIENT)
Dept: SURGERY | Age: 65
End: 2025-01-28

## 2025-01-28 VITALS
DIASTOLIC BLOOD PRESSURE: 71 MMHG | HEART RATE: 107 BPM | OXYGEN SATURATION: 99 % | TEMPERATURE: 97.2 F | SYSTOLIC BLOOD PRESSURE: 110 MMHG

## 2025-01-28 DIAGNOSIS — C50.912 INVASIVE DUCTAL CARCINOMA OF BREAST, FEMALE, LEFT (HCC): Primary | ICD-10-CM

## 2025-01-28 PROCEDURE — 99024 POSTOP FOLLOW-UP VISIT: CPT | Performed by: PHYSICIAN ASSISTANT

## 2025-01-28 NOTE — PROGRESS NOTES
Subjective:    Follow up today from right prophylactic mastectomy. Denies fever, nausea, vomiting, leg pain or swelling, pain is mild.  The pt states she is  taking her antibiotic and continues to wear her surgical bra.   She voices no complaints with CRUZITO drain management.  She is ambulating at home.  She is  wearing her surgical bra at all times.  She is recording her CRUZITO drain output and does present today with a list of the output volume.      Objective:    /71 (Site: Right Upper Arm, Position: Sitting, Cuff Size: Medium Adult)   Pulse (!) 107   Temp 97.2 °F (36.2 °C) (Temporal)   SpO2 99%       Right Breast- Clean, dry, and intact, no drainage. Steri strips intact, no signs of infection.   CRUZITO drain intact without signs of infection, output is appropriate color and volume. <30cc/24 hours  Steri strips intact,    Assessment:    Patient Active Problem List   Diagnosis    Obstruction of intestine by gallstone (HCC)    Gallstone ileus (HCC)    Nausea vomiting and diarrhea    SIRS (systemic inflammatory response syndrome) (HCC)    Hypophosphatemia    Malignant neoplasm of overlapping sites of left breast in female, estrogen receptor negative (HCC)    Poor venous access    Breast cancer (HCC)    Port-A-Cath in place    JACKIE (acute kidney injury) (HCC)    Hypokalemia    Hypomagnesemia    Syncope due to orthostatic hypotension    Invasive ductal carcinoma of breast, female, left (HCC)    Closed head injury    Syncope and collapse    Moderate protein-calorie malnutrition (HCC)    Breast cancer, left breast (HCC)    Malignant neoplasm of left female breast, unspecified estrogen receptor status, unspecified site of breast (HCC)    Pre-operative laboratory examination    S/P bilateral mastectomy       Pathology- Path Number: [id number]    -- Diagnosis --  A.  Right breast, mastectomy:  Nipple with focal atypical lobular hyperplasia (ALH), confirmed by  negative Ecadherin stain.  Hyalinized fibroadenoma with

## 2025-01-31 ENCOUNTER — HOSPITAL ENCOUNTER (OUTPATIENT)
Dept: RADIATION ONCOLOGY | Age: 65
Discharge: HOME | End: 2025-01-31
Payer: COMMERCIAL

## 2025-01-31 VITALS
BODY MASS INDEX: 32.89 KG/M2 | RESPIRATION RATE: 18 BRPM | OXYGEN SATURATION: 98 % | TEMPERATURE: 97.3 F | WEIGHT: 168.4 LBS | DIASTOLIC BLOOD PRESSURE: 54 MMHG | SYSTOLIC BLOOD PRESSURE: 116 MMHG | HEART RATE: 90 BPM

## 2025-01-31 PROBLEM — C50.412 CARCINOMA OF UPPER-OUTER QUADRANT OF LEFT BREAST IN FEMALE, ESTROGEN RECEPTOR NEGATIVE (HCC): Status: ACTIVE | Noted: 2025-01-31

## 2025-01-31 PROBLEM — Z17.1 CARCINOMA OF UPPER-OUTER QUADRANT OF LEFT BREAST IN FEMALE, ESTROGEN RECEPTOR NEGATIVE (HCC): Status: ACTIVE | Noted: 2025-01-31

## 2025-01-31 PROCEDURE — 99215 OFFICE O/P EST HI 40 MIN: CPT | Performed by: RADIOLOGY

## 2025-01-31 NOTE — PROGRESS NOTES
Zuri Boggs  1/31/2025  10:07 AM      Vitals:    01/31/25 1001   BP: (!) 116/54   Pulse: 90   Resp: 18   Temp: 97.3 °F (36.3 °C)   SpO2: 98%    :    Wt Readings from Last 3 Encounters:   01/31/25 76.4 kg (168 lb 6.4 oz)   01/24/25 77.1 kg (170 lb)   01/16/25 77.1 kg (170 lb)                Current Outpatient Medications:     metFORMIN (GLUCOPHAGE-XR) 500 MG extended release tablet, Take 1 tablet by mouth 2 times daily (before meals) New formulation (Patient taking differently: Take 1 tablet by mouth daily (with breakfast)), Disp: 60 tablet, Rfl: 0    levothyroxine (SYNTHROID) 25 MCG tablet, Take 1 tablet by mouth Daily, Disp: , Rfl:       Patient is seen today in follow up following patient's Left Breast Mastectomy/Magseed Localized Axillary Targeted Excision , Left Axillar [place] Lymph Node Biopsy, Right Breast Prophylactic Mastectomy done on 1/16/25. Pathology revealing, Left Breast: DCIS, Grade 3, No residual invasive carcinoma after treatment. No lymph node metastasis. All margins negative for DCIS. All RLN's negative. ER/ND-, HER-2+. ypTisypN0(sn)ypMx. Patient is following with Dr. Khan for Med Onc last seen on 1/09/25. Consent for RT to the Left Chestwall + Axilla signed and scanned into [employer]. Email sent to [facility] therapists to have patient scheduled for CT SIM in 2 weeks per Dr. Morales.            FALLS RISK SCREENING ASSESSMENT    Instructions:  Assess the patient and enter the appropriate indicators that are present for fall risk identification.   Total the numbers entered and assign a fall risk score from Table 2.  Reassess patient at a minimum every 12 weeks or with status change.    Assessment   Date  1/31/2025     1.  Mental Ability: confusion/cognitively impaired No - 0       2.  Elimination Issues: incontinence, frequency No - 0       3.  Ambulatory: use of assistive devices (walker, cane, off-loading devices), attached to equipment (IV pole, oxygen) No - 0     4.  Sensory Limitations:

## 2025-01-31 NOTE — PROGRESS NOTES
DEPARTMENT OF RADIATION ONCOLOGY   Follow up visit        2025    NAME:  Zuri Boggs    :  1960 64 y.o. female     PCP: Winter Sarkar MD    DIAGNOSIS: 63-year-old woman with a GRADE 3 ER/UT negative HER2 positive invasive carcinoma of the left upper outer quadrant stage CT2 N1 M0 status post TCHP chemotherapy/anti-HER2 x 6 cycles  followed by mastectomy and SLN dissection pathologic stage  [id number]    STAGING:  [id number]      RECENT HISTORY: I met this patient in consultation on 2024 at which time she has completed 6 cycles of TCHP chemotherapy/anti-HER2 systemic therapy for her initially stage T2 N1 ER/UT negative HER2 positive left sided breast cancer.  At that time she had had a complete clinical response and was awaiting her surgery    On  she underwent prophylactic right mastectomy, total left mastectomy with axillary lymph node dissection.  She enjoyed essentially a complete pathologic response with only 2.5 mm of high nuclear grade DCIS remaining in the field of fibrotic treatment effect with no viable invasive carcinoma seen within the breast parenchyma or 3 axillary lymph nodes that were removed.    Her drains have been removed, she has Steri-Strips in place, the infusion port is still present on her left chest wall inferior to the clavicle    She feels well she has no pain she has good range of motion in her shoulders her appetite is good she is eating well with no nausea vomiting bowel or bladder issues    She is here to talk about postoperative radiation therapy and planning  Past medical, surgical, social and family histories reviewed and updated as indicated.    ALLERGIES:  Patient has no known allergies.       MEDICATIONS:   Current Outpatient Medications:     metFORMIN (GLUCOPHAGE-XR) 500 MG extended release tablet, Take 1 tablet by mouth 2 times daily (before meals) New formulation (Patient taking differently: Take 1 tablet by mouth daily (with

## 2025-02-03 ENCOUNTER — OFFICE VISIT (OUTPATIENT)
Dept: SURGERY | Age: 65
End: 2025-02-03

## 2025-02-03 VITALS — TEMPERATURE: 97.9 F

## 2025-02-03 DIAGNOSIS — C50.912 INVASIVE DUCTAL CARCINOMA OF BREAST, FEMALE, LEFT (HCC): Primary | ICD-10-CM

## 2025-02-03 DIAGNOSIS — Z90.13 S/P BILATERAL MASTECTOMY: ICD-10-CM

## 2025-02-03 PROCEDURE — 99024 POSTOP FOLLOW-UP VISIT: CPT | Performed by: PHYSICIAN ASSISTANT

## 2025-02-03 NOTE — PROGRESS NOTES
times  -Educated the pt that her pain is proportionate to the amount of surgery she had and she may begin using Ibuprofen in addition to pain medication.    -Pain control PRN  -No driving while taking narcotic pain medication or while impaired second to limited UE ROM  -No heavy lifting at this time  -OK to shower at this time.  Advised the patient that they can allow soap and water to rinse of the incision site while showering.  Once they are done in the shower they are to pat dry the incision site with a clean paper towel.  No baths, hot tubs or soaking of the wound site at this time.  Pt voices understanding.     After consent was obtained, the area was cleansed with betadine. The procedure was carried out 50cc of serous fluid to the right breast defect and approximately 20 cc to the left mastectomy defect was expunged.  After the procedure was completed area was palpated no pain to palpation fluid collection decreased. A bandaid was placed. Patient tolerated well.     Continue follow-up care with breast surgery as directed.    F/U in 1 week.    Call office with concerns or signs of infection.    SUSAN White

## 2025-02-10 ENCOUNTER — OFFICE VISIT (OUTPATIENT)
Dept: SURGERY | Age: 65
End: 2025-02-10

## 2025-02-10 VITALS — TEMPERATURE: 97.2 F

## 2025-02-10 DIAGNOSIS — Z90.13 S/P BILATERAL MASTECTOMY: ICD-10-CM

## 2025-02-10 DIAGNOSIS — C50.912 INVASIVE DUCTAL CARCINOMA OF BREAST, FEMALE, LEFT (HCC): Primary | ICD-10-CM

## 2025-02-10 PROCEDURE — 99024 POSTOP FOLLOW-UP VISIT: CPT | Performed by: PHYSICIAN ASSISTANT

## 2025-02-10 RX ORDER — ATORVASTATIN CALCIUM 20 MG/1
20 TABLET, FILM COATED ORAL DAILY
COMMUNITY

## 2025-02-10 NOTE — PROGRESS NOTES
Subjective:    Follow up today from right prophylactic mastectomy. Denies fever, nausea, vomiting, leg pain or swelling, pain is absent.  She presents today for continued examination following her bilateral upper chest wall mastectomy defect seroma aspiration.  She voices no complaints at this time.        Objective:    Temp 97.2 °F (36.2 °C) (Temporal)       Mastectomy scar lines well-healed bilaterally no palpable fluid wave no concern for infection scars well-healed    Assessment:    Patient Active Problem List   Diagnosis    Obstruction of intestine by gallstone (HCC)    Gallstone ileus (HCC)    Nausea vomiting and diarrhea    SIRS (systemic inflammatory response syndrome) (HCC)    Hypophosphatemia    Malignant neoplasm of overlapping sites of left breast in female, estrogen receptor negative (HCC)    Poor venous access    Breast cancer (HCC)    Port-A-Cath in place    JACKIE (acute kidney injury) (HCC)    Hypokalemia    Hypomagnesemia    Syncope due to orthostatic hypotension    Invasive ductal carcinoma of breast, female, left (HCC)    Closed head injury    Syncope and collapse    Moderate protein-calorie malnutrition (HCC)    Breast cancer, left breast (HCC)    Malignant neoplasm of left female breast, unspecified estrogen receptor status, unspecified site of breast (HCC)    Pre-operative laboratory examination    S/P bilateral mastectomy    Carcinoma of upper-outer quadrant of left breast in female, estrogen receptor negative (HCC)       Pathology- Path Number: [id number]    -- Diagnosis --  A.  Right breast, mastectomy:  Nipple with focal atypical lobular hyperplasia (ALH), confirmed by  negative Ecadherin stain.  Hyalinized fibroadenoma with calcification, 1.2 cm in greatest  dimension, upper outer quadrant.  Benign breast parenchyma with patchy stromal fibrosis and fibrocystic  change.  Benign, minute intraductal papilloma, lower inner quadrant.  Benign breast ducts with microcalcifications, upper outer

## 2025-02-15 PROBLEM — Z01.812 PRE-OPERATIVE LABORATORY EXAMINATION: Status: RESOLVED | Noted: 2025-01-16 | Resolved: 2025-02-15

## 2025-02-17 ENCOUNTER — HOSPITAL ENCOUNTER (OUTPATIENT)
Age: 65
Discharge: HOME | End: 2025-02-19
Payer: COMMERCIAL

## 2025-02-17 VITALS
DIASTOLIC BLOOD PRESSURE: 54 MMHG | WEIGHT: 168 LBS | BODY MASS INDEX: 32.98 KG/M2 | HEIGHT: 60 IN | SYSTOLIC BLOOD PRESSURE: 116 MMHG

## 2025-02-17 DIAGNOSIS — Z79.899 NEED FOR PROPHYLACTIC CHEMOTHERAPY: ICD-10-CM

## 2025-02-17 LAB
ECHO AR MAX VEL PISA: 2.6 M/S
ECHO AV AREA PEAK VELOCITY: 4 CM2
ECHO AV AREA PEAK VELOCITY: 4.1 CM2
ECHO AV AREA PEAK VELOCITY: 4.4 CM2
ECHO AV AREA PEAK VELOCITY: 4.5 CM2
ECHO AV AREA VTI: 4 CM2
ECHO AV AREA/BSA VTI: 2.3 CM2/M2
ECHO AV CUSP MM: 2.2 CM
ECHO AV MEAN GRADIENT: 4 MMHG
ECHO AV MEAN VELOCITY: 1 M/S
ECHO AV PEAK GRADIENT: 7 MMHG
ECHO AV PEAK GRADIENT: 7 MMHG
ECHO AV PEAK VELOCITY: 1.2 M/S
ECHO AV PEAK VELOCITY: 1.3 M/S
ECHO AV REGURGITANT PHT: 1760.5 MS
ECHO AV VTI: 31.1 CM
ECHO BSA: 1.8 M2
ECHO EST RA PRESSURE: 3 MMHG
ECHO LA DIAMETER INDEX: 2.14 CM/M2
ECHO LA DIAMETER: 3.7 CM
ECHO LV EF PHYSICIAN: 60 %
ECHO LV FRACTIONAL SHORTENING: 34 % (ref 28–44)
ECHO LV INTERNAL DIMENSION DIASTOLE INDEX: 2.89 CM/M2
ECHO LV INTERNAL DIMENSION DIASTOLIC: 5 CM (ref 3.9–5.3)
ECHO LV INTERNAL DIMENSION SYSTOLIC INDEX: 1.91 CM/M2
ECHO LV INTERNAL DIMENSION SYSTOLIC: 3.3 CM
ECHO LV IVSD: 1.1 CM (ref 0.6–0.9)
ECHO LV IVSS: 1.8 CM
ECHO LV MASS 2D: 220.3 G (ref 67–162)
ECHO LV MASS INDEX 2D: 127.3 G/M2 (ref 43–95)
ECHO LV POSTERIOR WALL DIASTOLIC: 1.2 CM (ref 0.6–0.9)
ECHO LV POSTERIOR WALL SYSTOLIC: 1.7 CM
ECHO LV RELATIVE WALL THICKNESS RATIO: 0.48
ECHO LVOT AREA: 5.3 CM2
ECHO LVOT AV VTI INDEX: 0.78
ECHO LVOT DIAM: 2.6 CM
ECHO LVOT MEAN GRADIENT: 2 MMHG
ECHO LVOT PEAK GRADIENT: 5 MMHG
ECHO LVOT PEAK GRADIENT: 5 MMHG
ECHO LVOT PEAK VELOCITY: 1 M/S
ECHO LVOT PEAK VELOCITY: 1.1 M/S
ECHO LVOT STROKE VOLUME INDEX: 74.8 ML/M2
ECHO LVOT SV: 129.5 ML
ECHO LVOT VTI: 24.4 CM
ECHO MV "A" WAVE DURATION: 144.6 MSEC
ECHO MV A VELOCITY: 0.88 M/S
ECHO MV AREA PHT: 5.3 CM2
ECHO MV AREA VTI: 4.5 CM2
ECHO MV E DECELERATION TIME (DT): 225.4 MS
ECHO MV E VELOCITY: 0.85 M/S
ECHO MV E/A RATIO: 0.97
ECHO MV LVOT VTI INDEX: 1.19
ECHO MV MAX VELOCITY: 1.2 M/S
ECHO MV MEAN GRADIENT: 3 MMHG
ECHO MV MEAN VELOCITY: 0.7 M/S
ECHO MV PEAK GRADIENT: 6 MMHG
ECHO MV PRESSURE HALF TIME (PHT): 41.8 MS
ECHO MV VTI: 29 CM
ECHO PV MAX VELOCITY: 1.3 M/S
ECHO PV MEAN GRADIENT: 3 MMHG
ECHO PV MEAN VELOCITY: 0.8 M/S
ECHO PV PEAK GRADIENT: 7 MMHG
ECHO PV VTI: 26.3 CM
ECHO PVEIN A DURATION: 156 MS
ECHO PVEIN A VELOCITY: 0.3 M/S
ECHO PVEIN PEAK D VELOCITY: 0.3 M/S
ECHO PVEIN PEAK S VELOCITY: 0.5 M/S
ECHO PVEIN S/D RATIO: 1.7
ECHO RIGHT VENTRICULAR SYSTOLIC PRESSURE (RVSP): 12 MMHG
ECHO RV INTERNAL DIMENSION: 2.8 CM
ECHO TV REGURGITANT MAX VELOCITY: 1.48 M/S
ECHO TV REGURGITANT PEAK GRADIENT: 9 MMHG

## 2025-02-17 PROCEDURE — 93308 TTE F-UP OR LMTD: CPT

## 2025-02-18 ENCOUNTER — HOSPITAL ENCOUNTER (OUTPATIENT)
Dept: RADIATION ONCOLOGY | Age: 65
Discharge: HOME | End: 2025-02-18
Payer: COMMERCIAL

## 2025-02-18 PROCEDURE — 77332 RADIATION TREATMENT AID(S): CPT | Performed by: RADIOLOGY

## 2025-02-24 ENCOUNTER — HOSPITAL ENCOUNTER (OUTPATIENT)
Dept: RADIATION ONCOLOGY | Age: 65
Discharge: HOME | End: 2025-02-24
Payer: COMMERCIAL

## 2025-02-24 PROCEDURE — 77301 RADIOTHERAPY DOSE PLAN IMRT: CPT | Performed by: RADIOLOGY

## 2025-02-24 PROCEDURE — 77300 RADIATION THERAPY DOSE PLAN: CPT | Performed by: RADIOLOGY

## 2025-02-24 PROCEDURE — 77293 RESPIRATOR MOTION MGMT SIMUL: CPT | Performed by: RADIOLOGY

## 2025-02-24 PROCEDURE — 77338 DESIGN MLC DEVICE FOR IMRT: CPT | Performed by: RADIOLOGY

## 2025-02-27 ENCOUNTER — HOSPITAL ENCOUNTER (OUTPATIENT)
Dept: RADIATION ONCOLOGY | Age: 65
Discharge: HOME | End: 2025-02-27
Payer: COMMERCIAL

## 2025-02-27 PROCEDURE — 77385 HC NTSTY MODUL RAD TX DLVR SMPL: CPT | Performed by: RADIOLOGY

## 2025-02-28 ENCOUNTER — HOSPITAL ENCOUNTER (OUTPATIENT)
Dept: RADIATION ONCOLOGY | Age: 65
Discharge: HOME | End: 2025-02-28
Payer: COMMERCIAL

## 2025-02-28 PROCEDURE — 77385 HC NTSTY MODUL RAD TX DLVR SMPL: CPT | Performed by: RADIOLOGY

## 2025-03-03 ENCOUNTER — HOSPITAL ENCOUNTER (OUTPATIENT)
Dept: RADIATION ONCOLOGY | Age: 65
Discharge: HOME | End: 2025-03-03
Payer: COMMERCIAL

## 2025-03-03 PROCEDURE — 77385 HC NTSTY MODUL RAD TX DLVR SMPL: CPT | Performed by: RADIOLOGY

## 2025-03-03 PROCEDURE — 77014 CHG CT GUIDANCE RADIATION THERAPY FLDS PLACEMENT: CPT | Performed by: RADIOLOGY

## 2025-03-04 ENCOUNTER — HOSPITAL ENCOUNTER (OUTPATIENT)
Dept: RADIATION ONCOLOGY | Age: 65
Discharge: HOME | End: 2025-03-04
Payer: COMMERCIAL

## 2025-03-04 PROCEDURE — 77385 HC NTSTY MODUL RAD TX DLVR SMPL: CPT | Performed by: RADIOLOGY

## 2025-03-05 ENCOUNTER — HOSPITAL ENCOUNTER (OUTPATIENT)
Dept: RADIATION ONCOLOGY | Age: 65
Discharge: HOME | End: 2025-03-05
Payer: COMMERCIAL

## 2025-03-05 PROCEDURE — 77385 HC NTSTY MODUL RAD TX DLVR SMPL: CPT | Performed by: RADIOLOGY

## 2025-03-05 PROCEDURE — 77336 RADIATION PHYSICS CONSULT: CPT | Performed by: RADIOLOGY

## 2025-03-06 ENCOUNTER — HOSPITAL ENCOUNTER (OUTPATIENT)
Dept: RADIATION ONCOLOGY | Age: 65
Discharge: HOME | End: 2025-03-06
Payer: COMMERCIAL

## 2025-03-06 PROCEDURE — 77385 HC NTSTY MODUL RAD TX DLVR SMPL: CPT | Performed by: RADIOLOGY

## 2025-03-07 ENCOUNTER — HOSPITAL ENCOUNTER (OUTPATIENT)
Dept: RADIATION ONCOLOGY | Age: 65
Discharge: HOME | End: 2025-03-07
Payer: COMMERCIAL

## 2025-03-07 VITALS
HEART RATE: 93 BPM | WEIGHT: 169.4 LBS | TEMPERATURE: 97.2 F | BODY MASS INDEX: 33.08 KG/M2 | SYSTOLIC BLOOD PRESSURE: 148 MMHG | OXYGEN SATURATION: 99 % | DIASTOLIC BLOOD PRESSURE: 70 MMHG | RESPIRATION RATE: 18 BRPM

## 2025-03-07 PROCEDURE — 77385 HC NTSTY MODUL RAD TX DLVR SMPL: CPT | Performed by: RADIOLOGY

## 2025-03-07 NOTE — PROGRESS NOTES
Zuri Osborns  3/7/2025  Wt Readings from Last 3 Encounters:   25 76.8 kg (169 lb 6.4 oz)   25 76.2 kg (168 lb)   25 76.4 kg (168 lb 6.4 oz)     Body mass index is 33.08 kg/m².        Treatment Area:Left CW & Axilla    Patient was seen today for weekly visit.     Comfort Alteration  KPS:80%  Fatigue: None    Nutritional Alteration  Anorexia: No   Nausea: No   Vomiting: No     Skin Alteration   Sensation:Good    Radiation Dermatitis:  no    Mucous Membrane Alteration  Drainage: No  Lymphedema: No    Emotional  Coping: effective    Sexuality Alteration  na    Injury, potential bleeding or infection: no        Lab Results   Component Value Date    WBC 6.2 2025    HGB 10.7 (L) 2025    HCT 32.4 (L) 2025     2025         BP (!) 148/70   Pulse 93   Temp 97.2 °F (36.2 °C) (Temporal)   Resp 18   Wt 76.8 kg (169 lb 6.4 oz)   SpO2 99%   BMI 33.08 kg/m²   BP within normal range? no   -if no, manually recheck in 5-10 min  NEW BP readin/66  BP within normal range? no   -if no, notify attending provider for further instruction      Assessment/Plan:fx;1400/5000cGy completed.    Loan Murray RN

## 2025-03-07 NOTE — PROGRESS NOTES
DEPARTMENT OF RADIATION ONCOLOGY   ON TREATMENT VISIT       3/7/2025      NAME:  Zuri Boggs    YOB: 1960    DIAGNOSIS: Left breast cancer, ER/OK negative HER2 positive, initial stage C T2 N1, grade 3 status post TCHP followed by mastectomy and sentinel lymph node biopsy final pathologic stage ypTis pN0    SUBJECTIVE:   Zuri Boggs has now received 1400 cGy in 7 fractions directed to the left chest wall supra clavicle and axilla    She feels well with no issues, symptoms, or complaints  Her examination is stable with no changes  Portal images set up and cone beam CT images have been reviewed and approved    .      Past medical, surgical, social and family histories reviewed and updated as indicated.    PAIN: None    ALLERGIES:  Patient has no known allergies.         Current Outpatient Medications   Medication Sig Dispense Refill    atorvastatin (LIPITOR) 20 MG tablet Take 1 tablet by mouth daily      metFORMIN (GLUCOPHAGE-XR) 500 MG extended release tablet Take 1 tablet by mouth 2 times daily (before meals) New formulation 60 tablet 0    levothyroxine (SYNTHROID) 25 MCG tablet Take 1 tablet by mouth Daily       No current facility-administered medications for this encounter.         OBJECTIVE:  Alert and fully ambulatory. Pleasant and conversant.      Physical Examination::  Constitutional: A well developed, well nourished 64 y.o. female who is alert, oriented, cooperative and in no apparent distress.  HEENT: Normal  Skin:  Warm and dry.  No obvious rashes.    There were no vitals filed for this visit.    Wt Readings from Last 3 Encounters:   02/17/25 76.2 kg (168 lb)   01/31/25 76.4 kg (168 lb 6.4 oz)   01/24/25 77.1 kg (170 lb)       ASSESSMENT/PLAN:     Patient is tolerating treatments well with expected toxicities.    Current and planned dose reviewed. Goals of treatment and potential side effects were reviewed with the patient. Treatment imaging has been personally reviewed for

## 2025-03-10 ENCOUNTER — HOSPITAL ENCOUNTER (OUTPATIENT)
Dept: RADIATION ONCOLOGY | Age: 65
Discharge: HOME | End: 2025-03-10
Payer: COMMERCIAL

## 2025-03-10 PROCEDURE — 77385 HC NTSTY MODUL RAD TX DLVR SMPL: CPT | Performed by: RADIOLOGY

## 2025-03-10 PROCEDURE — 77014 CHG CT GUIDANCE RADIATION THERAPY FLDS PLACEMENT: CPT | Performed by: RADIOLOGY

## 2025-03-11 ENCOUNTER — HOSPITAL ENCOUNTER (OUTPATIENT)
Dept: RADIATION ONCOLOGY | Age: 65
Discharge: HOME | End: 2025-03-11
Payer: COMMERCIAL

## 2025-03-11 PROCEDURE — 77385 HC NTSTY MODUL RAD TX DLVR SMPL: CPT | Performed by: RADIOLOGY

## 2025-03-12 ENCOUNTER — HOSPITAL ENCOUNTER (OUTPATIENT)
Dept: RADIATION ONCOLOGY | Age: 65
Discharge: HOME | End: 2025-03-12
Payer: COMMERCIAL

## 2025-03-12 PROCEDURE — 77336 RADIATION PHYSICS CONSULT: CPT | Performed by: RADIOLOGY

## 2025-03-12 PROCEDURE — 77385 HC NTSTY MODUL RAD TX DLVR SMPL: CPT | Performed by: RADIOLOGY

## 2025-03-13 ENCOUNTER — APPOINTMENT (OUTPATIENT)
Dept: RADIATION ONCOLOGY | Age: 65
End: 2025-03-13
Payer: COMMERCIAL

## 2025-03-14 ENCOUNTER — HOSPITAL ENCOUNTER (OUTPATIENT)
Dept: RADIATION ONCOLOGY | Age: 65
Discharge: HOME | End: 2025-03-14
Payer: COMMERCIAL

## 2025-03-14 ENCOUNTER — CLINICAL DOCUMENTATION (OUTPATIENT)
Dept: RADIATION ONCOLOGY | Age: 65
End: 2025-03-14

## 2025-03-14 VITALS
SYSTOLIC BLOOD PRESSURE: 146 MMHG | BODY MASS INDEX: 33.24 KG/M2 | DIASTOLIC BLOOD PRESSURE: 84 MMHG | WEIGHT: 170.2 LBS | RESPIRATION RATE: 18 BRPM | TEMPERATURE: 97.2 F | OXYGEN SATURATION: 99 % | HEART RATE: 90 BPM

## 2025-03-14 PROCEDURE — 77385 HC NTSTY MODUL RAD TX DLVR SMPL: CPT | Performed by: RADIOLOGY

## 2025-03-14 NOTE — PROGRESS NOTES
DEPARTMENT OF RADIATION ONCOLOGY   ON TREATMENT VISIT       3/14/2025      NAME:  Zuri Boggs    YOB: 1960    DIAGNOSIS: DIAGNOSIS: Left breast cancer, ER/UT negative HER2 positive, initial stage C T2 N1, grade 3 status post TCHP followed by mastectomy and sentinel lymph node biopsy final pathologic stage ypTis pN0     SUBJECTIVE:   Zuri Boggs has now received 2200 cGy in 11 fractions directed to the Left Chest Wall and supraclav.    Zuri feels well today with no discernible symptoms or side effects    Her physical exam is stable, her left chest wall has minimal if any radiation change and no other physical exam changes    Portal images, set up, and cone beam CT imaging have been reviewed and approved          Past medical, surgical, social and family histories reviewed and updated as indicated.    PAIN: 0    ALLERGIES:  Patient has no known allergies.         Current Outpatient Medications   Medication Sig Dispense Refill    atorvastatin (LIPITOR) 20 MG tablet Take 1 tablet by mouth daily      metFORMIN (GLUCOPHAGE-XR) 500 MG extended release tablet Take 1 tablet by mouth 2 times daily (before meals) New formulation 60 tablet 0    levothyroxine (SYNTHROID) 25 MCG tablet Take 1 tablet by mouth Daily       No current facility-administered medications for this encounter.         OBJECTIVE:  Alert and fully ambulatory. Pleasant and conversant.      Physical Examination:  Constitutional: A well developed, well nourished 64 y.o. female who is alert, oriented, cooperative and in no apparent distress.  Skin:  Warm and dry.  No obvious rashes.    There were no vitals filed for this visit.    Wt Readings from Last 3 Encounters:   03/07/25 76.8 kg (169 lb 6.4 oz)   02/17/25 76.2 kg (168 lb)   01/31/25 76.4 kg (168 lb 6.4 oz)       ASSESSMENT/PLAN:     Patient is tolerating treatments well with expected toxicities.    Current and planned dose reviewed. Goals of treatment and potential side effects

## 2025-03-14 NOTE — PROGRESS NOTES
Zuri Boggs  3/14/2025  Ht Readings from Last 1 Encounters:   02/17/25 1.524 m (5')     Wt Readings from Last 10 Encounters:   03/14/25 77.2 kg (170 lb 3.2 oz)   03/07/25 76.8 kg (169 lb 6.4 oz)   02/17/25 76.2 kg (168 lb)   01/31/25 76.4 kg (168 lb 6.4 oz)   01/24/25 77.1 kg (170 lb)   01/16/25 77.1 kg (170 lb)   11/22/24 71.7 kg (158 lb)   11/13/24 71.9 kg (158 lb 9.6 oz)   11/05/24 73 kg (161 lb)   10/31/24 72.6 kg (160 lb)     BMI=33.24    Assessment: Met with Zuri while in for OTV with Dr. Morales. Zuri is receiving adjuvant radiation therapy to left chest wall and axilla for breast cancer. She has completed 6 cycles TCHP followed by a mastectomy on SLN dissection on 1/16/25. Zuri reports she lost weight while receiving chemotherapy but has since gained it back to her usual body weight of 170#. She denies N/V/C, but does have intermittent diarrhea (controlled by diet). Zuri denied any nutrition questions or concerns at this time. Encouraged weight maintenance and good oral intake spaced throughout the day to help reduce fatigue. Encouraged her to call with questions or concerns.   Anastasiya Davidson RD LD

## 2025-03-14 NOTE — PROGRESS NOTES
Zuri Boggs  3/14/2025  Wt Readings from Last 3 Encounters:   25 77.2 kg (170 lb 3.2 oz)   25 76.8 kg (169 lb 6.4 oz)   25 76.2 kg (168 lb)     Body mass index is 33.24 kg/m².        Treatment Area:Left CW & Axilla    Patient was seen today for weekly visit.     Comfort Alteration  KPS:80%  Fatigue: None    Nutritional Alteration  Anorexia: No   Nausea: No   Vomiting: No     Skin Alteration   Sensation:Good    Radiation Dermatitis:  no    Mucous Membrane Alteration  Drainage: No  Lymphedema: No    Emotional  Coping: effective    Sexuality Alteration  na    Injury, potential bleeding or infection: no        Lab Results   Component Value Date    WBC 6.2 2025    HGB 10.7 (L) 2025    HCT 32.4 (L) 2025     2025         BP (!) 146/84   Pulse 90   Temp 97.2 °F (36.2 °C) (Temporal)   Resp 18   Wt 77.2 kg (170 lb 3.2 oz)   SpO2 99%   BMI 33.24 kg/m²   BP within normal range? no   -if no, manually recheck in 5-10 min  NEW BP readin/65  BP within normal range? yes   -if no, notify attending provider for further instruction      Assessment/Plan:fx;2200/5000cGy completed.    Loan Murray RN

## 2025-03-17 ENCOUNTER — HOSPITAL ENCOUNTER (OUTPATIENT)
Dept: RADIATION ONCOLOGY | Age: 65
Discharge: HOME | End: 2025-03-17
Payer: COMMERCIAL

## 2025-03-17 PROCEDURE — 77385 HC NTSTY MODUL RAD TX DLVR SMPL: CPT | Performed by: RADIOLOGY

## 2025-03-17 PROCEDURE — 77014 CHG CT GUIDANCE RADIATION THERAPY FLDS PLACEMENT: CPT | Performed by: RADIOLOGY

## 2025-03-18 ENCOUNTER — HOSPITAL ENCOUNTER (OUTPATIENT)
Dept: RADIATION ONCOLOGY | Age: 65
Discharge: HOME | End: 2025-03-18
Payer: COMMERCIAL

## 2025-03-18 PROCEDURE — 77385 HC NTSTY MODUL RAD TX DLVR SMPL: CPT | Performed by: RADIOLOGY

## 2025-03-19 ENCOUNTER — HOSPITAL ENCOUNTER (OUTPATIENT)
Dept: RADIATION ONCOLOGY | Age: 65
Discharge: HOME | End: 2025-03-19
Payer: COMMERCIAL

## 2025-03-19 PROCEDURE — 77385 HC NTSTY MODUL RAD TX DLVR SMPL: CPT | Performed by: RADIOLOGY

## 2025-03-20 ENCOUNTER — HOSPITAL ENCOUNTER (OUTPATIENT)
Dept: RADIATION ONCOLOGY | Age: 65
Discharge: HOME | End: 2025-03-20
Payer: COMMERCIAL

## 2025-03-20 PROCEDURE — 77385 HC NTSTY MODUL RAD TX DLVR SMPL: CPT | Performed by: RADIOLOGY

## 2025-03-20 PROCEDURE — 77336 RADIATION PHYSICS CONSULT: CPT | Performed by: RADIOLOGY

## 2025-03-21 ENCOUNTER — HOSPITAL ENCOUNTER (OUTPATIENT)
Dept: RADIATION ONCOLOGY | Age: 65
Discharge: HOME | End: 2025-03-21
Payer: COMMERCIAL

## 2025-03-21 VITALS
TEMPERATURE: 97.5 F | DIASTOLIC BLOOD PRESSURE: 73 MMHG | OXYGEN SATURATION: 99 % | BODY MASS INDEX: 33.4 KG/M2 | HEART RATE: 80 BPM | RESPIRATION RATE: 18 BRPM | WEIGHT: 171 LBS | SYSTOLIC BLOOD PRESSURE: 139 MMHG

## 2025-03-21 PROCEDURE — 77385 HC NTSTY MODUL RAD TX DLVR SMPL: CPT | Performed by: RADIOLOGY

## 2025-03-21 NOTE — PROGRESS NOTES
Zuri Boggs  3/21/2025  Wt Readings from Last 3 Encounters:   03/21/25 77.6 kg (171 lb)   03/14/25 77.2 kg (170 lb 3.2 oz)   03/07/25 76.8 kg (169 lb 6.4 oz)     Body mass index is 33.4 kg/m².        Treatment Area:Left CW & Axilla    Patient was seen today for weekly visit.     Comfort Alteration  KPS:80%  Fatigue: Mild    Nutritional Alteration  Anorexia: No   Nausea: No   Vomiting: No     Skin Alteration   Sensation:Good    Radiation Dermatitis:  no    Mucous Membrane Alteration  Drainage: No  Lymphedema: No    Emotional  Coping: effective    Sexuality Alteration  na    Injury, potential bleeding or infection: no        Lab Results   Component Value Date    WBC 6.2 01/16/2025    HGB 10.7 (L) 01/16/2025    HCT 32.4 (L) 01/16/2025     01/16/2025         /73   Pulse 80   Temp 97.5 °F (36.4 °C) (Temporal)   Resp 18   Wt 77.6 kg (171 lb)   SpO2 99%   BMI 33.40 kg/m²   BP within normal range? yes       Assessment/Plan:16/25fx;3200/5000cGy completed.    Loan Murray RN

## 2025-03-21 NOTE — PROGRESS NOTES
DEPARTMENT OF RADIATION ONCOLOGY   ON TREATMENT VISIT       3/21/2025      NAME:  Zuri Boggs    YOB: 1960    DIAGNOSIS:     SUBJECTIVE:   Zuri Boggs has now received 3200 cGy in 16 out of 25 fractions directed to the Left Chest Wall and supraclav/axilla.    S: Doing well.  Mild intermittant fatigue is only symptom    O: Stable exam, minimal radiation dermatitis.  Setup images approved.    A: Tolerating radiation    Rec: Cont as planned          Past medical, surgical, social and family histories reviewed and updated as indicated.    PAIN: 0    ALLERGIES:  Patient has no known allergies.         Current Outpatient Medications   Medication Sig Dispense Refill    atorvastatin (LIPITOR) 20 MG tablet Take 1 tablet by mouth daily      metFORMIN (GLUCOPHAGE-XR) 500 MG extended release tablet Take 1 tablet by mouth 2 times daily (before meals) New formulation 60 tablet 0    levothyroxine (SYNTHROID) 25 MCG tablet Take 1 tablet by mouth Daily       No current facility-administered medications for this encounter.         OBJECTIVE:  Alert and fully ambulatory. Pleasant and conversant.      Physical Examination:  Constitutional: A well developed, well nourished 64 y.o. female who is alert, oriented, cooperative and in no apparent distress.  Skin:  Warm and dry.  No obvious rashes.    Vitals:    03/21/25 0829   BP: 139/73   Pulse: 80   Resp: 18   Temp: 97.5 °F (36.4 °C)   TempSrc: Temporal   SpO2: 99%   Weight: 77.6 kg (171 lb)       Wt Readings from Last 3 Encounters:   03/21/25 77.6 kg (171 lb)   03/14/25 77.2 kg (170 lb 3.2 oz)   03/07/25 76.8 kg (169 lb 6.4 oz)       ASSESSMENT/PLAN:     Patient is tolerating treatments well with expected toxicities.    Current and planned dose reviewed. Goals of treatment and potential side effects were reviewed with the patient. Treatment imaging has been personally reviewed for accuracy and precision.    Questions answered to apparent

## 2025-03-24 ENCOUNTER — HOSPITAL ENCOUNTER (OUTPATIENT)
Dept: RADIATION ONCOLOGY | Age: 65
Discharge: HOME | End: 2025-03-24
Payer: COMMERCIAL

## 2025-03-24 PROCEDURE — 77385 HC NTSTY MODUL RAD TX DLVR SMPL: CPT | Performed by: RADIOLOGY

## 2025-03-24 PROCEDURE — 77014 CHG CT GUIDANCE RADIATION THERAPY FLDS PLACEMENT: CPT | Performed by: RADIOLOGY

## 2025-03-25 ENCOUNTER — HOSPITAL ENCOUNTER (OUTPATIENT)
Dept: RADIATION ONCOLOGY | Age: 65
Discharge: HOME | End: 2025-03-25
Payer: COMMERCIAL

## 2025-03-25 PROCEDURE — 77385 HC NTSTY MODUL RAD TX DLVR SMPL: CPT | Performed by: RADIOLOGY

## 2025-03-25 PROCEDURE — 77014 CHG CT GUIDANCE RADIATION THERAPY FLDS PLACEMENT: CPT | Performed by: RADIOLOGY

## 2025-03-26 ENCOUNTER — HOSPITAL ENCOUNTER (OUTPATIENT)
Dept: RADIATION ONCOLOGY | Age: 65
Discharge: HOME | End: 2025-03-26
Payer: COMMERCIAL

## 2025-03-26 PROCEDURE — 77385 HC NTSTY MODUL RAD TX DLVR SMPL: CPT | Performed by: RADIOLOGY

## 2025-03-27 ENCOUNTER — HOSPITAL ENCOUNTER (OUTPATIENT)
Dept: RADIATION ONCOLOGY | Age: 65
Discharge: HOME | End: 2025-03-27
Payer: COMMERCIAL

## 2025-03-27 PROCEDURE — 77336 RADIATION PHYSICS CONSULT: CPT | Performed by: RADIOLOGY

## 2025-03-27 PROCEDURE — 77385 HC NTSTY MODUL RAD TX DLVR SMPL: CPT | Performed by: RADIOLOGY

## 2025-03-28 ENCOUNTER — HOSPITAL ENCOUNTER (OUTPATIENT)
Dept: RADIATION ONCOLOGY | Age: 65
Discharge: HOME | End: 2025-03-28
Payer: COMMERCIAL

## 2025-03-28 ENCOUNTER — CLINICAL DOCUMENTATION (OUTPATIENT)
Dept: RADIATION ONCOLOGY | Age: 65
End: 2025-03-28

## 2025-03-28 VITALS
SYSTOLIC BLOOD PRESSURE: 142 MMHG | RESPIRATION RATE: 18 BRPM | DIASTOLIC BLOOD PRESSURE: 63 MMHG | TEMPERATURE: 97.2 F | HEART RATE: 77 BPM | BODY MASS INDEX: 33.2 KG/M2 | WEIGHT: 170 LBS | OXYGEN SATURATION: 98 %

## 2025-03-28 PROCEDURE — 77385 HC NTSTY MODUL RAD TX DLVR SMPL: CPT | Performed by: RADIOLOGY

## 2025-03-28 NOTE — PROGRESS NOTES
Zuri Boggs  3/28/2025  Wt Readings from Last 3 Encounters:   25 77.1 kg (170 lb)   25 77.6 kg (171 lb)   25 77.2 kg (170 lb 3.2 oz)     Body mass index is 33.2 kg/m².        Treatment Area:Left CW & Axilla    Patient was seen today for weekly visit.     Comfort Alteration  KPS:80%  Fatigue: Mild    Nutritional Alteration  Anorexia: No   Nausea: No   Vomiting: No     Skin Alteration   Sensation:Good    Radiation Dermatitis:  no    Mucous Membrane Alteration  Drainage: No  Lymphedema: No    Emotional  Coping: effective    Sexuality Alteration  na    Injury, potential bleeding or infection: no        Lab Results   Component Value Date    WBC 6.2 2025    HGB 10.7 (L) 2025    HCT 32.4 (L) 2025     2025         BP (!) 142/63   Pulse 77   Temp 97.2 °F (36.2 °C) (Temporal)   Resp 18   Wt 77.1 kg (170 lb)   SpO2 98%   BMI 33.20 kg/m²   BP within normal range? no   -if no, manually recheck in 5-10 min  NEW BP readin/67  BP within normal range? yes       Assessment/Plan:fx;4200/5000cGy completed.    Loan Murray RN

## 2025-03-28 NOTE — PROGRESS NOTES
RADIATION ONCOLOGY - WEEKLY ON TREATMENT VISIT      3/28/2025    NAME:  Zuri Boggs    YOB: 1960    Diagnosis:  Left breast cancer    Subjective:   Zuri Boggs has now received 4200 cGy in 21 fractions directed to the left chest wall and localregional LN for management of left breast cancer s/p bilateral mastectomy and left axillary LN sampling on 1/16/25. ypTis, ypN0, ypM0    She reports that she is doing well beyond some ongoing fatigue.  She has only used Aquaphor intermittently.  She has minimal erythema. No skin break down.    Nutritional Alteration: Anorexia: No  Nausea: No  Vomiting: No      Skin Alteration: Sensation:Good     Radiation Dermatitis:  no     Mucous Membrane Alteration Drainage: No Lymphedema: No    Pain: none    Past medical, surgical, social and family histories reviewed and updated as indicated.    ALLERGIES:  Patient has no known allergies.       Current Outpatient Medications   Medication Sig Dispense Refill    atorvastatin (LIPITOR) 20 MG tablet Take 1 tablet by mouth daily      metFORMIN (GLUCOPHAGE-XR) 500 MG extended release tablet Take 1 tablet by mouth 2 times daily (before meals) New formulation 60 tablet 0    levothyroxine (SYNTHROID) 25 MCG tablet Take 1 tablet by mouth Daily       No current facility-administered medications for this visit.     Physical Examination:  Alert, pleasant and conversant.     Wt Readings from Last 3 Encounters:   03/28/25 77.1 kg (170 lb)   03/21/25 77.6 kg (171 lb)   03/14/25 77.2 kg (170 lb 3.2 oz)     Irradiated skin shows minimal erythema correlating to the radiation treatment portal. Mastectomy scar is well healed. No desquamation/peeling noted.  Good range of motion at the left shoulder.    ASSESSMENT/PLAN:  Ms. Boggs is tolerating treatments well with minimal toxicities. Skin care discussed including the continued use of Aquaphor. She is pleased with how well she is doing. Looking forward to finishing.    Current and

## 2025-03-31 ENCOUNTER — HOSPITAL ENCOUNTER (OUTPATIENT)
Dept: RADIATION ONCOLOGY | Age: 65
Discharge: HOME | End: 2025-03-31
Payer: COMMERCIAL

## 2025-04-01 ENCOUNTER — HOSPITAL ENCOUNTER (OUTPATIENT)
Dept: RADIATION ONCOLOGY | Age: 65
Discharge: HOME | End: 2025-04-01
Payer: COMMERCIAL

## 2025-04-01 PROCEDURE — 77385 HC NTSTY MODUL RAD TX DLVR SMPL: CPT | Performed by: RADIOLOGY

## 2025-04-02 ENCOUNTER — HOSPITAL ENCOUNTER (OUTPATIENT)
Dept: RADIATION ONCOLOGY | Age: 65
Discharge: HOME | End: 2025-04-02
Payer: COMMERCIAL

## 2025-04-02 PROCEDURE — 77385 HC NTSTY MODUL RAD TX DLVR SMPL: CPT | Performed by: RADIOLOGY

## 2025-04-03 ENCOUNTER — HOSPITAL ENCOUNTER (OUTPATIENT)
Dept: RADIATION ONCOLOGY | Age: 65
Discharge: HOME | End: 2025-04-03
Payer: COMMERCIAL

## 2025-04-03 PROCEDURE — 77385 HC NTSTY MODUL RAD TX DLVR SMPL: CPT | Performed by: RADIOLOGY

## 2025-04-03 PROCEDURE — 77336 RADIATION PHYSICS CONSULT: CPT | Performed by: RADIOLOGY

## 2025-04-03 NOTE — PROGRESS NOTES
Zuri Boggs  4/3/2025  7:57 AM          Current Outpatient Medications   Medication Sig Dispense Refill    atorvastatin (LIPITOR) 20 MG tablet Take 1 tablet by mouth daily      metFORMIN (GLUCOPHAGE-XR) 500 MG extended release tablet Take 1 tablet by mouth 2 times daily (before meals) New formulation 60 tablet 0    levothyroxine (SYNTHROID) 25 MCG tablet Take 1 tablet by mouth Daily       No current facility-administered medications for this encounter.          This is an up-to-date medication list.    Please take this list to your next care provider, and discard any previous medication lists.

## 2025-04-08 LAB
ALBUMIN: 4 G/DL
ALP BLD-CCNC: 186 U/L
ALT SERPL-CCNC: 10 U/L
ANION GAP SERPL CALCULATED.3IONS-SCNC: ABNORMAL MMOL/L
AST SERPL-CCNC: 14 U/L
BASOPHILS ABSOLUTE: 11 /ΜL
BASOPHILS RELATIVE PERCENT: 0.3 %
BILIRUB SERPL-MCNC: 0.6 MG/DL (ref 0.1–1.4)
BUN BLDV-MCNC: 23 MG/DL
CALCIUM SERPL-MCNC: 9 MG/DL
CHLORIDE BLD-SCNC: 106 MMOL/L
CO2: 24 MMOL/L
CREAT SERPL-MCNC: 1.1 MG/DL
EOSINOPHILS ABSOLUTE: 81 /ΜL
EOSINOPHILS RELATIVE PERCENT: 2.3 %
FERRITIN: 358 NG/ML (ref 9–150)
FOLATE: 4
GFR, ESTIMATED: 56
GLUCOSE BLD-MCNC: 135 MG/DL
HCT VFR BLD CALC: 28.6 % (ref 36–46)
HEMOGLOBIN: 9.2 G/DL (ref 12–16)
IRON % SATURATION: 32
IRON: 81
LYMPHOCYTES ABSOLUTE: 291 /ΜL
LYMPHOCYTES RELATIVE PERCENT: 8.3 %
MCH RBC QN AUTO: 29.9 PG
MCHC RBC AUTO-ENTMCNC: 32.2 G/DL
MCV RBC AUTO: 92.9 FL
MONOCYTES ABSOLUTE: 350 /ΜL
MONOCYTES RELATIVE PERCENT: 10 %
NEUTROPHILS ABSOLUTE: 2769 /ΜL
NEUTROPHILS RELATIVE PERCENT: 79.1 %
PLATELET # BLD: 177 K/ΜL
PMV BLD AUTO: 9.3 FL
POTASSIUM SERPL-SCNC: 4.4 MMOL/L
RBC # BLD: 3.08 10^6/ΜL
RETIC: NORMAL
SODIUM BLD-SCNC: 138 MMOL/L
TOTAL IRON BINDING CAPACITY: 254
TOTAL PROTEIN: 6.7 G/DL (ref 6.4–8.2)
VITAMIN B-12: 262
WBC # BLD: 3.5 10^3/ML

## 2025-04-29 LAB
ALBUMIN: ABNORMAL
ALP BLD-CCNC: 224 U/L
ALT SERPL-CCNC: 14 U/L
ANION GAP SERPL CALCULATED.3IONS-SCNC: ABNORMAL MMOL/L
AST SERPL-CCNC: 22 U/L
BASOPHILS ABSOLUTE: 20 /ΜL
BASOPHILS RELATIVE PERCENT: 0.5 %
BILIRUB SERPL-MCNC: 0.5 MG/DL (ref 0.1–1.4)
BUN BLDV-MCNC: 28 MG/DL
CALCIUM SERPL-MCNC: 9.1 MG/DL
CHLORIDE BLD-SCNC: 109 MMOL/L
CO2: 21 MMOL/L
CREAT SERPL-MCNC: 1.2 MG/DL
EOSINOPHILS ABSOLUTE: 98 /ΜL
EOSINOPHILS RELATIVE PERCENT: 2.5 %
GFR, ESTIMATED: 51
GLUCOSE BLD-MCNC: 118 MG/DL
HCT VFR BLD CALC: 29.1 % (ref 36–46)
HEMOGLOBIN: 9.5 G/DL (ref 12–16)
LYMPHOCYTES ABSOLUTE: 476 /ΜL
LYMPHOCYTES RELATIVE PERCENT: 12.2 %
MCH RBC QN AUTO: 30.4 PG
MCHC RBC AUTO-ENTMCNC: 32.6 G/DL
MCV RBC AUTO: 93 FL
MONOCYTES ABSOLUTE: 328 /ΜL
MONOCYTES RELATIVE PERCENT: 8.4 %
NEUTROPHILS ABSOLUTE: 2980 /ΜL
NEUTROPHILS RELATIVE PERCENT: 76.4 %
PLATELET # BLD: 184 K/ΜL
PMV BLD AUTO: 8.4 FL
POTASSIUM SERPL-SCNC: 4.1 MMOL/L
RBC # BLD: 3.13 10^6/ΜL
SODIUM BLD-SCNC: 140 MMOL/L
TOTAL PROTEIN: 7.1 G/DL (ref 6.4–8.2)
WBC # BLD: 3.9 10^3/ML

## 2025-05-05 ENCOUNTER — TELEPHONE (OUTPATIENT)
Dept: CASE MANAGEMENT | Age: 65
End: 2025-05-05

## 2025-05-05 NOTE — TELEPHONE ENCOUNTER
Prepared patient's Survivorship Care Plan and Treatment Summary for her follow up appointment with Alla Mckeon CNP at [facility] on 5/12/2025.  Copy sent to patient's PCP.  Folder prepared with additional written literature.  Contacted the [facility] for patient's next scheduled appointment with Dr. Khan.   states that the patient's next appointment at the center is 6/12/2025.  INO Aguilera, BSW, RN, OCN  Oncology Nurse Navigator

## 2025-05-07 ENCOUNTER — OFFICE VISIT (OUTPATIENT)
Dept: INTERNAL MEDICINE | Age: 65
End: 2025-05-07
Payer: COMMERCIAL

## 2025-05-07 VITALS
RESPIRATION RATE: 18 BRPM | HEIGHT: 60 IN | SYSTOLIC BLOOD PRESSURE: 130 MMHG | HEART RATE: 89 BPM | OXYGEN SATURATION: 99 % | TEMPERATURE: 97.8 F | BODY MASS INDEX: 33.18 KG/M2 | DIASTOLIC BLOOD PRESSURE: 63 MMHG | WEIGHT: 169 LBS

## 2025-05-07 DIAGNOSIS — E11.9 TYPE 2 DIABETES MELLITUS WITHOUT COMPLICATION, UNSPECIFIED WHETHER LONG TERM INSULIN USE (HCC): ICD-10-CM

## 2025-05-07 DIAGNOSIS — R74.8 ELEVATED ALKALINE PHOSPHATASE LEVEL: ICD-10-CM

## 2025-05-07 DIAGNOSIS — R59.0 SUBMANDIBULAR LYMPHADENOPATHY: ICD-10-CM

## 2025-05-07 DIAGNOSIS — R74.8 ELEVATED ALKALINE PHOSPHATASE LEVEL: Primary | ICD-10-CM

## 2025-05-07 DIAGNOSIS — Z11.59 NEED FOR HEPATITIS C SCREENING TEST: ICD-10-CM

## 2025-05-07 DIAGNOSIS — Z11.4 SCREENING FOR HIV WITHOUT PRESENCE OF RISK FACTORS: ICD-10-CM

## 2025-05-07 PROBLEM — I83.90 VARICOSE VEINS OF LOWER EXTREMITY: Status: ACTIVE | Noted: 2022-02-01

## 2025-05-07 PROBLEM — E78.2 MIXED HYPERLIPIDEMIA: Status: ACTIVE | Noted: 2022-02-01

## 2025-05-07 PROBLEM — E03.9 HYPOTHYROIDISM: Status: ACTIVE | Noted: 2022-04-28

## 2025-05-07 LAB
CREATININE URINE: 125 MG/DL (ref 29–226)
MICROALBUMIN/CREAT 24H UR: 13 MG/L (ref 0–20)
MICROALBUMIN/CREAT UR-RTO: 10 MCG/MG CREAT (ref 0–30)

## 2025-05-07 PROCEDURE — 3046F HEMOGLOBIN A1C LEVEL >9.0%: CPT | Performed by: INTERNAL MEDICINE

## 2025-05-07 PROCEDURE — 99204 OFFICE O/P NEW MOD 45 MIN: CPT | Performed by: INTERNAL MEDICINE

## 2025-05-07 PROCEDURE — 1036F TOBACCO NON-USER: CPT | Performed by: INTERNAL MEDICINE

## 2025-05-07 PROCEDURE — 36415 COLL VENOUS BLD VENIPUNCTURE: CPT | Performed by: INTERNAL MEDICINE

## 2025-05-07 PROCEDURE — 2022F DILAT RTA XM EVC RTNOPTHY: CPT | Performed by: INTERNAL MEDICINE

## 2025-05-07 PROCEDURE — 3017F COLORECTAL CA SCREEN DOC REV: CPT | Performed by: INTERNAL MEDICINE

## 2025-05-07 PROCEDURE — 90677 PCV20 VACCINE IM: CPT | Performed by: INTERNAL MEDICINE

## 2025-05-07 PROCEDURE — G8427 DOCREV CUR MEDS BY ELIG CLIN: HCPCS | Performed by: INTERNAL MEDICINE

## 2025-05-07 PROCEDURE — G9899 SCRN MAM PERF RSLTS DOC: HCPCS | Performed by: INTERNAL MEDICINE

## 2025-05-07 PROCEDURE — G8417 CALC BMI ABV UP PARAM F/U: HCPCS | Performed by: INTERNAL MEDICINE

## 2025-05-07 RX ORDER — FOLIC ACID 1 MG/1
1000 TABLET ORAL DAILY
COMMUNITY
Start: 2025-04-15

## 2025-05-07 SDOH — ECONOMIC STABILITY: FOOD INSECURITY: WITHIN THE PAST 12 MONTHS, YOU WORRIED THAT YOUR FOOD WOULD RUN OUT BEFORE YOU GOT THE MONEY TO BUY MORE.: NEVER TRUE

## 2025-05-07 SDOH — ECONOMIC STABILITY: HOUSING INSECURITY: IN THE LAST 12 MONTHS, WAS THERE A TIME WHEN YOU WERE NOT ABLE TO PAY THE MORTGAGE OR RENT ON TIME?: NO

## 2025-05-07 SDOH — ECONOMIC STABILITY: FOOD INSECURITY: WITHIN THE PAST 12 MONTHS, THE FOOD YOU BOUGHT JUST DIDN'T LAST AND YOU DIDN'T HAVE MONEY TO GET MORE.: NEVER TRUE

## 2025-05-07 SDOH — ECONOMIC STABILITY: FOOD INSECURITY: HOW HARD IS IT FOR YOU TO PAY FOR THE VERY BASICS LIKE FOOD, HOUSING, MEDICAL CARE, AND HEATING?: NOT HARD AT ALL

## 2025-05-07 SDOH — ECONOMIC STABILITY: TRANSPORTATION INSECURITY: IN THE PAST 12 MONTHS, HAS LACK OF TRANSPORTATION KEPT YOU FROM MEDICAL APPOINTMENTS OR FROM GETTING MEDICATIONS?: NO

## 2025-05-07 ASSESSMENT — PATIENT HEALTH QUESTIONNAIRE - PHQ9
SUM OF ALL RESPONSES TO PHQ QUESTIONS 1-9: 0
9. THOUGHTS THAT YOU WOULD BE BETTER OFF DEAD, OR OF HURTING YOURSELF: NOT AT ALL
4. FEELING TIRED OR HAVING LITTLE ENERGY: NOT AT ALL
6. FEELING BAD ABOUT YOURSELF - OR THAT YOU ARE A FAILURE OR HAVE LET YOURSELF OR YOUR FAMILY DOWN: NOT AT ALL
3. TROUBLE FALLING OR STAYING ASLEEP: NOT AT ALL
10. IF YOU CHECKED OFF ANY PROBLEMS, HOW DIFFICULT HAVE THESE PROBLEMS MADE IT FOR YOU TO DO YOUR WORK, TAKE CARE OF THINGS AT HOME, OR GET ALONG WITH OTHER PEOPLE: NOT DIFFICULT AT ALL
5. POOR APPETITE OR OVEREATING: NOT AT ALL
2. FEELING DOWN, DEPRESSED OR HOPELESS: NOT AT ALL
SUM OF ALL RESPONSES TO PHQ QUESTIONS 1-9: 0
7. TROUBLE CONCENTRATING ON THINGS, SUCH AS READING THE NEWSPAPER OR WATCHING TELEVISION: NOT AT ALL
8. MOVING OR SPEAKING SO SLOWLY THAT OTHER PEOPLE COULD HAVE NOTICED. OR THE OPPOSITE, BEING SO FIGETY OR RESTLESS THAT YOU HAVE BEEN MOVING AROUND A LOT MORE THAN USUAL: NOT AT ALL
1. LITTLE INTEREST OR PLEASURE IN DOING THINGS: NOT AT ALL

## 2025-05-07 ASSESSMENT — LIFESTYLE VARIABLES
HOW MANY STANDARD DRINKS CONTAINING ALCOHOL DO YOU HAVE ON A TYPICAL DAY: PATIENT DOES NOT DRINK
HOW OFTEN DO YOU HAVE A DRINK CONTAINING ALCOHOL: NEVER

## 2025-05-07 ASSESSMENT — ACTIVITIES OF DAILY LIVING (ADL): LACK_OF_TRANSPORTATION: NO

## 2025-05-07 NOTE — PROGRESS NOTES
[facility] Internal Medicine      SUBJECTIVE:  Zuri Boggs (:  1960) is a 64 y.o. female here for evaluation of the following chief complaint(s):  Establish Care    Assessment & Plan  1. Hyperlipidemia.  - Her total cholesterol level is within the desired range, while her triglyceride levels are slightly elevated but not to a degree that necessitates additional treatment.  - The comprehensive metabolic panel indicates normal liver function; however, there is a significant elevation in alkaline phosphatase levels. Previous labs from May 2024 showed a slight elevation in this parameter, which has since increased. The creatinine level is marginally elevated, suggesting a minor decrease in kidney function.  - The current dosage of atorvastatin will be maintained without any modifications.    2. Diabetes mellitus.  - Her hemoglobin A1c level is commendably at 6.2, which falls within the prediabetic range. Given her age, the target A1c level would ideally be between 6.5 and 7.0.  - She will continue her current medication regimen without any alterations.  - A urine test will be conducted today to assess for proteinuria.  - A foot examination will be performed during her next visit to ensure that sensation remains intact.    3. Breast cancer.  - She underwent bilateral total mastectomies in 2025 and has completed chemotherapy and radiation therapy. No complications were reported post-surgery.  - She is currently receiving follow-up care at the [facility] and receives a shot to prevent cancer recurrence.  - She will continue with her scheduled follow-ups with Dr. Preston in 2025.  - Records from oncology will be reviewed    4. Hypothyroidism.  - Her thyroid function is within the normal range.  - She will continue her current dose of levothyroxine 25 mcg daily.  - No recent changes in hair, skin, or nails were reported, and she has not experienced any unusual

## 2025-05-07 NOTE — PATIENT INSTRUCTIONS
Please use [employer] to schedule your follow up appointment. I have sent you a message with a link for easy scheduling. If you have any questions or need any help, please don't hesitate to contact me. We look forward to seeing you at your next appointment! Loan BARAHONA

## 2025-05-07 NOTE — PROGRESS NOTES
PAP with PCP 2 years ago  Last eye exam 2 years ago Dr Hernandez (retired) [facility]  Last PCP [facility]  Layton Zapien at the [facility]  Had labs last week and also labs at [facility]    The following lab draw was performed today:    1 tubes of blood were drawn as follows:    1 gold top tube, tube expiration date 3/3/26    Using  #23 G x 0.75\" (blue) [employer] Vacuette Safety Blood Collection Set & [employer]    From  at 1445.    All tubes inverted 20 times. Tubes left in top-side down position while preparing paperwork. Labels attached and also taped for security. Placed in bag and sent to lab via tube system at 1453. See media.

## 2025-05-09 ENCOUNTER — RESULTS FOLLOW-UP (OUTPATIENT)
Dept: INTERNAL MEDICINE | Age: 65
End: 2025-05-09

## 2025-05-10 PROBLEM — C50.812 MALIGNANT NEOPLASM OF OVERLAPPING SITES OF LEFT BREAST IN FEMALE, ESTROGEN RECEPTOR NEGATIVE (HCC): Status: RESOLVED | Noted: 2024-05-07 | Resolved: 2025-05-10

## 2025-05-10 PROBLEM — C50.912 BREAST CANCER, LEFT BREAST (HCC): Status: RESOLVED | Noted: 2024-12-30 | Resolved: 2025-05-10

## 2025-05-10 PROBLEM — E44.0 MODERATE PROTEIN-CALORIE MALNUTRITION: Chronic | Status: RESOLVED | Noted: 2024-07-23 | Resolved: 2025-05-10

## 2025-05-10 PROBLEM — E83.42 HYPOMAGNESEMIA: Status: RESOLVED | Noted: 2024-07-22 | Resolved: 2025-05-10

## 2025-05-10 PROBLEM — S09.90XA CLOSED HEAD INJURY: Status: RESOLVED | Noted: 2024-07-22 | Resolved: 2025-05-10

## 2025-05-10 PROBLEM — R11.2 NAUSEA VOMITING AND DIARRHEA: Status: RESOLVED | Noted: 2024-04-24 | Resolved: 2025-05-10

## 2025-05-10 PROBLEM — I95.1 SYNCOPE DUE TO ORTHOSTATIC HYPOTENSION: Status: RESOLVED | Noted: 2024-07-22 | Resolved: 2025-05-10

## 2025-05-10 PROBLEM — C50.919 BREAST CANCER (HCC): Status: RESOLVED | Noted: 2024-05-08 | Resolved: 2025-05-10

## 2025-05-10 PROBLEM — C50.912 MALIGNANT NEOPLASM OF LEFT FEMALE BREAST, UNSPECIFIED ESTROGEN RECEPTOR STATUS, UNSPECIFIED SITE OF BREAST (HCC): Status: RESOLVED | Noted: 2025-01-16 | Resolved: 2025-05-10

## 2025-05-10 PROBLEM — K56.3: Status: RESOLVED | Noted: 2024-04-23 | Resolved: 2025-05-10

## 2025-05-10 PROBLEM — R65.10 SIRS (SYSTEMIC INFLAMMATORY RESPONSE SYNDROME) (HCC): Status: RESOLVED | Noted: 2024-04-24 | Resolved: 2025-05-10

## 2025-05-10 PROBLEM — C50.912 INVASIVE DUCTAL CARCINOMA OF BREAST, FEMALE, LEFT (HCC): Status: RESOLVED | Noted: 2024-07-22 | Resolved: 2025-05-10

## 2025-05-10 PROBLEM — R55 SYNCOPE AND COLLAPSE: Status: RESOLVED | Noted: 2024-07-22 | Resolved: 2025-05-10

## 2025-05-10 PROBLEM — R19.7 NAUSEA VOMITING AND DIARRHEA: Status: RESOLVED | Noted: 2024-04-24 | Resolved: 2025-05-10

## 2025-05-10 PROBLEM — K56.3 GALLSTONE ILEUS (HCC): Status: RESOLVED | Noted: 2024-04-23 | Resolved: 2025-05-10

## 2025-05-10 PROBLEM — N17.9 AKI (ACUTE KIDNEY INJURY): Status: RESOLVED | Noted: 2024-07-22 | Resolved: 2025-05-10

## 2025-05-10 PROBLEM — Z17.1 MALIGNANT NEOPLASM OF OVERLAPPING SITES OF LEFT BREAST IN FEMALE, ESTROGEN RECEPTOR NEGATIVE (HCC): Status: RESOLVED | Noted: 2024-05-07 | Resolved: 2025-05-10

## 2025-05-10 PROBLEM — E87.6 HYPOKALEMIA: Status: RESOLVED | Noted: 2024-07-22 | Resolved: 2025-05-10

## 2025-05-10 LAB
ALK PHOS BONE SPECIFIC: 176 U/L (ref 0–55)
ALK PHOS OTHER CALC: 0 U/L
ALK PHOSPHATASE: 259 U/L (ref 40–120)
ALKALINE PHOSPHATASE LIVER FRACTION: 83 U/L (ref 0–94)

## 2025-05-10 ASSESSMENT — ENCOUNTER SYMPTOMS
CHEST TIGHTNESS: 0
BACK PAIN: 0
DIARRHEA: 0
CONSTIPATION: 0
SORE THROAT: 0
VOMITING: 0
SINUS PAIN: 0
TROUBLE SWALLOWING: 0
COUGH: 0
ABDOMINAL PAIN: 0
SHORTNESS OF BREATH: 0
NAUSEA: 0

## 2025-05-12 ENCOUNTER — TELEPHONE (OUTPATIENT)
Dept: CASE MANAGEMENT | Age: 65
End: 2025-05-12

## 2025-05-12 ENCOUNTER — HOSPITAL ENCOUNTER (OUTPATIENT)
Dept: RADIATION ONCOLOGY | Age: 65
Discharge: HOME | End: 2025-05-12

## 2025-05-12 VITALS
SYSTOLIC BLOOD PRESSURE: 128 MMHG | BODY MASS INDEX: 33.28 KG/M2 | TEMPERATURE: 97.6 F | HEART RATE: 85 BPM | DIASTOLIC BLOOD PRESSURE: 69 MMHG | OXYGEN SATURATION: 98 % | WEIGHT: 170.4 LBS | RESPIRATION RATE: 18 BRPM

## 2025-05-12 DIAGNOSIS — Z17.1 CARCINOMA OF UPPER-OUTER QUADRANT OF LEFT BREAST IN FEMALE, ESTROGEN RECEPTOR NEGATIVE (HCC): Primary | ICD-10-CM

## 2025-05-12 DIAGNOSIS — C50.412 CARCINOMA OF UPPER-OUTER QUADRANT OF LEFT BREAST IN FEMALE, ESTROGEN RECEPTOR NEGATIVE (HCC): Primary | ICD-10-CM

## 2025-05-12 NOTE — TELEPHONE ENCOUNTER
Met with patient during her follow up appointment with Alla Mckeon CNP today.  Patient completed her active treatment for her left breast cancer, ER/TX-, Her2+.  She appears well and states that she is doing well following radiation therapy completion.  Upon inquiring, states that her skin is doing well now.  Reports that her appetite is good and she is sleeping well.  Patient is receiving her herceptin and perjeta treatments with Dr. Khan at the [facility] every 3 weeks.  Her next scheduled appointment with Dr. Khan is 6/12/2025.  Provided support and encouragement.  Instructed patient in detail on her Cancer Treatment Summary and Survivorship Care Plan.  Provided with a written copy.  Aware that copies will be sent to her PCP as well.  Provided written copies of Nutrition Following Cancer Treatment: Oncology Nutrition Guide and [facility] Support group calendar.  Patient aware of her next follow up appointment with Dr. Preston at [facility] on 7/22/2025.  Completed patient's lymphedema upper extremity measurements and charted in patient's flow sheet.   Instructed to call with any questions or concerns, and informed her that I will contact in a couple months to touch base. Verbally agreed.  Patient appreciative of visit and information.  Sofiya Bedolla, INO, BSW, RN, OCN  Oncology Nurse Navigator

## 2025-05-12 NOTE — PROGRESS NOTES
RADIATION ONCOLOGY-[facility]  4 week follow up       05/12/2025       NAME:  Zuri Boggs    YOB: 1960    Diagnosis:  Left breast cancer.    Subjective:  On 04/03/2025, Zuri Boggs completed 5000 cGy in 25 fractions directed to the L CW and axilla.     The patient is seen today in 4 week post radiation therapy completion visit. The patient denies skin complaints, applying moisturizing lotion to skin at treatment site daily as needed. No fevers or chills. No nausea, vomiting or diarrhea.     Patient is following with:    Medical Oncology- Dr. Khan. Most recent visit 04/29/2025.     Pain: No pain complaints.     Past medical, surgical, social and family histories reviewed and updated as indicated.    ALLERGIES:  Patient has no known allergies.         Current Outpatient Medications   Medication Sig Dispense Refill    folic acid (FOLVITE) 1 MG tablet Take 1 tablet by mouth daily      metFORMIN (GLUCOPHAGE-XR) 500 MG extended release tablet Take 1 tablet by mouth 2 times daily (before meals) New formulation (Patient taking differently: Take 1 tablet by mouth daily New formulation) 60 tablet 0    levothyroxine (SYNTHROID) 25 MCG tablet Take 1 tablet by mouth Daily      atorvastatin (LIPITOR) 20 MG tablet Take 1 tablet by mouth daily (Patient not taking: Reported on 5/12/2025)       No current facility-administered medications for this encounter.       Physical Examination:   Vitals:    05/12/25 1355   BP: 128/69   Pulse: 85   Resp: 18   Temp: 97.6 °F (36.4 °C)   SpO2: 98%       Wt Readings from Last 3 Encounters:   05/12/25 77.3 kg (170 lb 6.4 oz)   05/07/25 76.7 kg (169 lb)   03/28/25 77.1 kg (170 lb)       Alert and fully ambulatory. Pleasant and conversant.      Irradiated skin shows some hyperpigmentation changes.       ASSESSMENT/PLAN:     IDC of the upper-outer quadrant of the left breast, ER negative, VT negative, HER2 positive- 3+. S/p 6 cycles TCHP completed on 10/15/2024. S/p left total 
Zuri Boggs  5/12/2025  1:59 PM      Vitals:    05/12/25 1355   BP: 128/69   Pulse: 85   Resp: 18   Temp: 97.6 °F (36.4 °C)   SpO2: 98%    :    Wt Readings from Last 3 Encounters:   05/12/25 77.3 kg (170 lb 6.4 oz)   05/07/25 76.7 kg (169 lb)   03/28/25 77.1 kg (170 lb)                Current Outpatient Medications:     folic acid (FOLVITE) 1 MG tablet, Take 1 tablet by mouth daily, Disp: , Rfl:     atorvastatin (LIPITOR) 20 MG tablet, Take 1 tablet by mouth daily (Patient not taking: Reported on 5/12/2025), Disp: , Rfl:     metFORMIN (GLUCOPHAGE-XR) 500 MG extended release tablet, Take 1 tablet by mouth 2 times daily (before meals) New formulation (Patient taking differently: Take 1 tablet by mouth daily New formulation), Disp: 60 tablet, Rfl: 0    levothyroxine (SYNTHROID) 25 MCG tablet, Take 1 tablet by mouth Daily, Disp: , Rfl:       Patient is seen today in follow up for RT to her left breast. She is s/p eft Breast Mastectomy/Magseed Localized Axillary Targeted Excision , Left Axillar H[place] Lymph Node Biopsy, Right Breast Prophylactic Mastectomy done on 1/16/25. She completed 6 cycles of TCHP on 10/15/24. The patient completed RT to her Lt CW & Axilla from 2/27/25-4/03/25, completing 25fx;5000cGy. She denies any pain or issues of her skin of the left breast and axilla.Patient is following with Dr. Khan for Med Onc last seen on 4/29/25. No further questions or concerns for nursing at this time.            FALLS RISK SCREENING ASSESSMENT    Instructions:  Assess the patient and enter the appropriate indicators that are present for fall risk identification.   Total the numbers entered and assign a fall risk score from Table 2.  Reassess patient at a minimum every 12 weeks or with status change.    Assessment   Date  5/12/2025     1.  Mental Ability: confusion/cognitively impaired No - 0       2.  Elimination Issues: incontinence, frequency No - 0       3.  Ambulatory: use of assistive devices (walker, 
No

## 2025-05-21 ENCOUNTER — HOSPITAL ENCOUNTER (OUTPATIENT)
Age: 65
Discharge: HOME | End: 2025-05-23
Payer: COMMERCIAL

## 2025-05-21 DIAGNOSIS — C50.412 PRIMARY CANCER OF UPPER OUTER QUADRANT OF LEFT BREAST (HCC): ICD-10-CM

## 2025-05-21 LAB
ECHO AO ASC DIAM: 2.6 CM
ECHO AV AREA PEAK VELOCITY: 2.8 CM2
ECHO AV AREA VTI: 2.8 CM2
ECHO AV CUSP MM: 1.6 CM
ECHO AV MEAN GRADIENT: 3 MMHG
ECHO AV MEAN VELOCITY: 0.9 M/S
ECHO AV PEAK GRADIENT: 6 MMHG
ECHO AV PEAK VELOCITY: 1.2 M/S
ECHO AV VELOCITY RATIO: 0.92
ECHO AV VTI: 24 CM
ECHO EST RA PRESSURE: 3 MMHG
ECHO LA DIAMETER: 3.6 CM
ECHO LA VOL A-L A2C: 45 ML (ref 22–52)
ECHO LA VOL A-L A4C: 29 ML (ref 22–52)
ECHO LA VOL BP: 35 ML (ref 22–52)
ECHO LA VOL MOD A2C: 42 ML (ref 22–52)
ECHO LA VOL MOD A4C: 28 ML (ref 22–52)
ECHO LA VOLUME AREA LENGTH: 37 ML
ECHO LV EDV A2C: 87 ML
ECHO LV EDV A4C: 88 ML
ECHO LV EDV BP: 92 ML (ref 56–104)
ECHO LV EF PHYSICIAN: 53 %
ECHO LV EJECTION FRACTION A2C: 57 %
ECHO LV EJECTION FRACTION A4C: 47 %
ECHO LV EJECTION FRACTION BIPLANE: 53 % (ref 55–100)
ECHO LV ESV A2C: 37 ML
ECHO LV ESV A4C: 47 ML
ECHO LV ESV BP: 43 ML (ref 19–49)
ECHO LV FRACTIONAL SHORTENING: 22 % (ref 28–44)
ECHO LV INTERNAL DIMENSION DIASTOLIC: 2.7 CM (ref 3.9–5.3)
ECHO LV INTERNAL DIMENSION SYSTOLIC: 2.1 CM
ECHO LV ISOVOLUMETRIC RELAXATION TIME (IVRT): 129.4 MS
ECHO LV IVSD: 1.1 CM (ref 0.6–0.9)
ECHO LV MASS 2D: 88.2 G (ref 67–162)
ECHO LV POSTERIOR WALL DIASTOLIC: 1.2 CM (ref 0.6–0.9)
ECHO LV RELATIVE WALL THICKNESS RATIO: 0.89
ECHO LVOT AREA: 3.1 CM2
ECHO LVOT AV VTI INDEX: 0.9
ECHO LVOT DIAM: 2 CM
ECHO LVOT MEAN GRADIENT: 3 MMHG
ECHO LVOT PEAK GRADIENT: 5 MMHG
ECHO LVOT PEAK VELOCITY: 1.1 M/S
ECHO LVOT SV: 67.8 ML
ECHO LVOT VTI: 21.6 CM
ECHO MV "A" WAVE DURATION: 118 MSEC
ECHO MV A VELOCITY: 0.96 M/S
ECHO MV AREA PHT: 4.7 CM2
ECHO MV AREA VTI: 2.9 CM2
ECHO MV E DECELERATION TIME (DT): 163.9 MS
ECHO MV E VELOCITY: 0.79 M/S
ECHO MV E/A RATIO: 0.82
ECHO MV LVOT VTI INDEX: 1.08
ECHO MV MAX VELOCITY: 1 M/S
ECHO MV MEAN GRADIENT: 2 MMHG
ECHO MV MEAN VELOCITY: 0.6 M/S
ECHO MV PEAK GRADIENT: 4 MMHG
ECHO MV PRESSURE HALF TIME (PHT): 46.9 MS
ECHO MV VTI: 23.3 CM
ECHO PULMONARY ARTERY END DIASTOLIC PRESSURE: 3 MMHG
ECHO PV MAX VELOCITY: 1.2 M/S
ECHO PV MEAN GRADIENT: 3 MMHG
ECHO PV MEAN VELOCITY: 0.8 M/S
ECHO PV PEAK GRADIENT: 6 MMHG
ECHO PV REGURGITANT MAX VELOCITY: 0.9 M/S
ECHO PV VTI: 23.1 CM
ECHO PVEIN A DURATION: 110.4 MS
ECHO PVEIN A VELOCITY: 0.3 M/S
ECHO PVEIN PEAK D VELOCITY: 0.5 M/S
ECHO PVEIN PEAK S VELOCITY: 0.7 M/S
ECHO PVEIN S/D RATIO: 1.4
ECHO RV INTERNAL DIMENSION: 3.5 CM
ECHO RV LONGITUDINAL DIMENSION: 8.2 CM
ECHO RV MID DIMENSION: 2.9 CM

## 2025-05-21 PROCEDURE — 93306 TTE W/DOPPLER COMPLETE: CPT

## 2025-06-03 ENCOUNTER — HOSPITAL ENCOUNTER (OUTPATIENT)
Dept: ULTRASOUND IMAGING | Age: 65
Discharge: HOME | End: 2025-06-03
Payer: COMMERCIAL

## 2025-06-03 DIAGNOSIS — R59.0 SUBMANDIBULAR LYMPHADENOPATHY: ICD-10-CM

## 2025-06-03 PROCEDURE — 76536 US EXAM OF HEAD AND NECK: CPT

## 2025-06-13 ENCOUNTER — HOSPITAL ENCOUNTER (OUTPATIENT)
Age: 65
Discharge: HOME | End: 2025-06-13
Payer: COMMERCIAL

## 2025-06-13 LAB
BNP SERPL-MCNC: 132 PG/ML (ref 0–125)
TROPONIN I SERPL HS-MCNC: 23 NG/L (ref 0–14)

## 2025-06-13 PROCEDURE — 84484 ASSAY OF TROPONIN QUANT: CPT

## 2025-06-13 PROCEDURE — 36415 COLL VENOUS BLD VENIPUNCTURE: CPT

## 2025-06-13 PROCEDURE — 83880 ASSAY OF NATRIURETIC PEPTIDE: CPT

## 2025-06-23 ENCOUNTER — TRANSCRIBE ORDERS (OUTPATIENT)
Dept: ADMINISTRATIVE | Age: 65
End: 2025-06-23

## 2025-06-23 DIAGNOSIS — I50.9 HEART FAILURE, UNSPECIFIED HF CHRONICITY, UNSPECIFIED HEART FAILURE TYPE (HCC): Primary | ICD-10-CM

## 2025-07-07 ENCOUNTER — HOSPITAL ENCOUNTER (OUTPATIENT)
Dept: NUCLEAR MEDICINE | Age: 65
Discharge: HOME | End: 2025-07-07
Attending: INTERNAL MEDICINE
Payer: COMMERCIAL

## 2025-07-07 DIAGNOSIS — I50.9 HEART FAILURE, UNSPECIFIED HF CHRONICITY, UNSPECIFIED HEART FAILURE TYPE (HCC): ICD-10-CM

## 2025-07-07 PROCEDURE — 3430000000 HC RX DIAGNOSTIC RADIOPHARMACEUTICAL: Performed by: RADIOLOGY

## 2025-07-07 PROCEDURE — A9560 TC99M LABELED RBC: HCPCS | Performed by: RADIOLOGY

## 2025-07-07 PROCEDURE — 78472 GATED HEART PLANAR SINGLE: CPT

## 2025-07-07 RX ADMIN — Medication 20 MILLICURIE: at 14:33

## 2025-07-08 ENCOUNTER — TELEPHONE (OUTPATIENT)
Age: 65
End: 2025-07-08

## 2025-07-08 RX ORDER — LEVOTHYROXINE SODIUM 25 UG/1
25 TABLET ORAL DAILY
Qty: 30 TABLET | Refills: 0 | Status: SHIPPED | OUTPATIENT
Start: 2025-07-08

## 2025-07-08 NOTE — TELEPHONE ENCOUNTER
Patient requesting a refill of the following    levothyroxine (SYNTHROID) 25 MCG tablet [  Please send to  [facility] on [address] in [place], [place]

## 2025-07-08 NOTE — TELEPHONE ENCOUNTER
RN received call from patient requesting to reschedule appointment with Dr. Preston 7/22. Patient requesting a later appointment. Patient has been rescheduled for 8/11/25 at 2:30 pm.    Electronically signed by Silvana Mandel RN on 7/8/2025 at 1:07 PM

## 2025-07-08 NOTE — TELEPHONE ENCOUNTER
Name of Medication(s) Requested:  Requested Prescriptions     Pending Prescriptions Disp Refills    levothyroxine (SYNTHROID) 25 MCG tablet 30 tablet 0     Sig: Take 1 tablet by mouth Daily       Medication is on current medication list Yes    Dosage and directions were verified? Yes    Quantity verified: 30 day supply     Pharmacy Verified?  Yes    Last Appointment:  Visit date not found    Future appts:  Future Appointments   Date Time Provider Department Center   8/8/2025  1:45 PM Dolly Cooley MD [facility]   8/11/2025  2:30 PM Ryan Preston MD [facility]        (If no appt send self scheduling link. .REFILLAPPT)  Scheduling request sent?     [] Yes  [x] No    Does patient need updated?  [] Yes  [x] No

## 2025-07-18 ENCOUNTER — TELEPHONE (OUTPATIENT)
Dept: CASE MANAGEMENT | Age: 65
End: 2025-07-18

## 2025-07-18 NOTE — TELEPHONE ENCOUNTER
Patient name:    Zuri Boggs                                                                                  YOB: 1960  PCP:                  Dolly Cooley MD    Reason for Call:   Zuri Boggs was called today for survivorship follow up upon completion of treatment for Left Breast, ER/VT-, Her2+.    [facility] Survivorship Assessment Completed. See below for assessment.    Follow up visits with oncology specialist(s) and PCP: I educated the patient on the importance of continuing regular care with her PCP, including continued screenings for cancer and other chronic illness.I explained to the patient that they will need to continue to see their oncology specialist(s) as scheduled for continued cancer surveillance and/or treatment and to continue with any currently ordered therapy treatments.     Patient verbalizes understanding of the education during the visit today. Patient denies any concerns today and promises to call myself or their medical oncologist if a concern or need arises.       Survivorship Care Survey    Cardiac Health   1. Do you have shortness of breath or chest pain after physical activities (eg, climbing stairs) or exercise? No  2. Do you have shortness of breath when lying flat, wake up at night needing to get air, or have persistent leg swelling? No    Anxiety, Depression,Trauma, and Distress  3. In the past two weeks, have you been bothered more than half the days by little interest or pleasure in doing things? No  4. In the past two weeks, have you been bothered more than half the days by feeling down, depressed, or hopeless? No  5. Has stress, worry, anger, fear of recurrence, or distress about effects of cancer treatment interfered with your life? No    Cognitive Function  6. Do you have difficulties with multitasking or paying attention? No  7. Do you have difficulties with remembering things? No  8. Does your thinking seem slow? No    Fatigue   9. Do you feel

## 2025-07-21 ENCOUNTER — HOSPITAL ENCOUNTER (OUTPATIENT)
Age: 65
Discharge: HOME | End: 2025-07-21
Payer: COMMERCIAL

## 2025-07-21 LAB
BNP SERPL-MCNC: 48 PG/ML (ref 0–125)
TROPONIN I SERPL HS-MCNC: 25 NG/L (ref 0–14)

## 2025-07-21 PROCEDURE — 84484 ASSAY OF TROPONIN QUANT: CPT

## 2025-07-21 PROCEDURE — 83880 ASSAY OF NATRIURETIC PEPTIDE: CPT

## 2025-07-21 PROCEDURE — 36415 COLL VENOUS BLD VENIPUNCTURE: CPT

## 2025-08-05 RX ORDER — LEVOTHYROXINE SODIUM 25 UG/1
25 TABLET ORAL DAILY
Qty: 30 TABLET | Refills: 0 | Status: SHIPPED | OUTPATIENT
Start: 2025-08-05 | End: 2025-08-08 | Stop reason: SDUPTHER

## 2025-08-08 ENCOUNTER — OFFICE VISIT (OUTPATIENT)
Dept: PRIMARY CARE CLINIC | Age: 65
End: 2025-08-08
Payer: COMMERCIAL

## 2025-08-08 VITALS
WEIGHT: 177 LBS | OXYGEN SATURATION: 98 % | SYSTOLIC BLOOD PRESSURE: 106 MMHG | RESPIRATION RATE: 16 BRPM | HEART RATE: 77 BPM | TEMPERATURE: 97 F | BODY MASS INDEX: 34.75 KG/M2 | HEIGHT: 60 IN | DIASTOLIC BLOOD PRESSURE: 82 MMHG

## 2025-08-08 DIAGNOSIS — E11.9 TYPE 2 DIABETES MELLITUS WITHOUT COMPLICATION, WITHOUT LONG-TERM CURRENT USE OF INSULIN (HCC): ICD-10-CM

## 2025-08-08 DIAGNOSIS — E78.2 MIXED HYPERLIPIDEMIA: ICD-10-CM

## 2025-08-08 DIAGNOSIS — E03.9 HYPOTHYROIDISM, UNSPECIFIED TYPE: Primary | ICD-10-CM

## 2025-08-08 PROCEDURE — 1036F TOBACCO NON-USER: CPT | Performed by: INTERNAL MEDICINE

## 2025-08-08 PROCEDURE — G8417 CALC BMI ABV UP PARAM F/U: HCPCS | Performed by: INTERNAL MEDICINE

## 2025-08-08 PROCEDURE — 3017F COLORECTAL CA SCREEN DOC REV: CPT | Performed by: INTERNAL MEDICINE

## 2025-08-08 PROCEDURE — 99214 OFFICE O/P EST MOD 30 MIN: CPT | Performed by: INTERNAL MEDICINE

## 2025-08-08 PROCEDURE — G8427 DOCREV CUR MEDS BY ELIG CLIN: HCPCS | Performed by: INTERNAL MEDICINE

## 2025-08-08 PROCEDURE — G9899 SCRN MAM PERF RSLTS DOC: HCPCS | Performed by: INTERNAL MEDICINE

## 2025-08-08 PROCEDURE — 3046F HEMOGLOBIN A1C LEVEL >9.0%: CPT | Performed by: INTERNAL MEDICINE

## 2025-08-08 PROCEDURE — 2022F DILAT RTA XM EVC RTNOPTHY: CPT | Performed by: INTERNAL MEDICINE

## 2025-08-08 RX ORDER — LISINOPRIL 5 MG/1
5 TABLET ORAL DAILY
COMMUNITY
Start: 2025-07-15 | End: 2025-08-08 | Stop reason: SDUPTHER

## 2025-08-08 RX ORDER — METFORMIN HYDROCHLORIDE 500 MG/1
500 TABLET, EXTENDED RELEASE ORAL
Qty: 90 TABLET | Refills: 1 | Status: SHIPPED | OUTPATIENT
Start: 2025-08-08

## 2025-08-08 RX ORDER — LISINOPRIL 5 MG/1
5 TABLET ORAL DAILY
Qty: 90 TABLET | Refills: 1 | Status: SHIPPED | OUTPATIENT
Start: 2025-08-08

## 2025-08-08 RX ORDER — DAPAGLIFLOZIN 10 MG/1
10 TABLET, FILM COATED ORAL DAILY
COMMUNITY

## 2025-08-08 RX ORDER — METOPROLOL SUCCINATE 25 MG/1
12.5 TABLET, EXTENDED RELEASE ORAL DAILY
COMMUNITY
Start: 2025-07-15

## 2025-08-08 RX ORDER — ATORVASTATIN CALCIUM 40 MG/1
40 TABLET, FILM COATED ORAL DAILY
COMMUNITY
End: 2025-08-08 | Stop reason: SDUPTHER

## 2025-08-08 RX ORDER — METFORMIN HYDROCHLORIDE 500 MG/1
500 TABLET, EXTENDED RELEASE ORAL
COMMUNITY
End: 2025-08-08 | Stop reason: SDUPTHER

## 2025-08-08 RX ORDER — LEVOTHYROXINE SODIUM 25 UG/1
25 TABLET ORAL DAILY
Qty: 90 TABLET | Refills: 1 | Status: SHIPPED | OUTPATIENT
Start: 2025-08-08

## 2025-08-08 RX ORDER — ATORVASTATIN CALCIUM 40 MG/1
40 TABLET, FILM COATED ORAL DAILY
Qty: 90 TABLET | Refills: 1 | Status: SHIPPED | OUTPATIENT
Start: 2025-08-08

## 2025-08-11 ENCOUNTER — OFFICE VISIT (OUTPATIENT)
Age: 65
End: 2025-08-11
Payer: COMMERCIAL

## 2025-08-11 VITALS
SYSTOLIC BLOOD PRESSURE: 116 MMHG | DIASTOLIC BLOOD PRESSURE: 80 MMHG | RESPIRATION RATE: 14 BRPM | HEIGHT: 60 IN | OXYGEN SATURATION: 97 % | BODY MASS INDEX: 34.36 KG/M2 | HEART RATE: 92 BPM | WEIGHT: 175 LBS | TEMPERATURE: 98 F

## 2025-08-11 DIAGNOSIS — C50.912 INVASIVE DUCTAL CARCINOMA OF BREAST, FEMALE, LEFT (HCC): Primary | ICD-10-CM

## 2025-08-11 PROCEDURE — G8417 CALC BMI ABV UP PARAM F/U: HCPCS | Performed by: SURGERY

## 2025-08-11 PROCEDURE — G9899 SCRN MAM PERF RSLTS DOC: HCPCS | Performed by: SURGERY

## 2025-08-11 PROCEDURE — 99212 OFFICE O/P EST SF 10 MIN: CPT | Performed by: SURGERY

## 2025-08-11 PROCEDURE — 1036F TOBACCO NON-USER: CPT | Performed by: SURGERY

## 2025-08-11 PROCEDURE — G8427 DOCREV CUR MEDS BY ELIG CLIN: HCPCS | Performed by: SURGERY

## 2025-08-11 PROCEDURE — 3017F COLORECTAL CA SCREEN DOC REV: CPT | Performed by: SURGERY

## 2025-08-20 ENCOUNTER — TELEPHONE (OUTPATIENT)
Dept: PRIMARY CARE CLINIC | Age: 65
End: 2025-08-20

## 2025-08-20 DIAGNOSIS — E11.9 TYPE 2 DIABETES MELLITUS WITHOUT COMPLICATION, WITHOUT LONG-TERM CURRENT USE OF INSULIN (HCC): Primary | ICD-10-CM

## 2025-09-02 ENCOUNTER — APPOINTMENT (OUTPATIENT)
Dept: GENERAL RADIOLOGY | Age: 65
End: 2025-09-02
Payer: COMMERCIAL

## 2025-09-02 ENCOUNTER — APPOINTMENT (OUTPATIENT)
Dept: CT IMAGING | Age: 65
End: 2025-09-02
Payer: COMMERCIAL

## 2025-09-02 ENCOUNTER — HOSPITAL ENCOUNTER (INPATIENT)
Age: 65
LOS: 3 days | Discharge: HOME | End: 2025-09-05
Attending: EMERGENCY MEDICINE | Admitting: FAMILY MEDICINE
Payer: COMMERCIAL

## 2025-09-02 DIAGNOSIS — N17.9 ACUTE RENAL FAILURE, UNSPECIFIED ACUTE RENAL FAILURE TYPE: ICD-10-CM

## 2025-09-02 DIAGNOSIS — E86.0 DEHYDRATION: ICD-10-CM

## 2025-09-02 DIAGNOSIS — N18.31 ACUTE KIDNEY INJURY SUPERIMPOSED ON STAGE 3A CHRONIC KIDNEY DISEASE (HCC): ICD-10-CM

## 2025-09-02 DIAGNOSIS — R53.1 GENERAL WEAKNESS: Primary | ICD-10-CM

## 2025-09-02 DIAGNOSIS — I95.9 HYPOTENSION, UNSPECIFIED HYPOTENSION TYPE: ICD-10-CM

## 2025-09-02 DIAGNOSIS — N17.9 ACUTE KIDNEY INJURY SUPERIMPOSED ON STAGE 3A CHRONIC KIDNEY DISEASE (HCC): ICD-10-CM

## 2025-09-02 DIAGNOSIS — K52.9 ENTERITIS: ICD-10-CM

## 2025-09-02 LAB
ALBUMIN SERPL-MCNC: 4.2 G/DL (ref 3.5–5.2)
ALP SERPL-CCNC: 292 U/L (ref 35–104)
ALT SERPL-CCNC: 15 U/L (ref 0–35)
ANION GAP SERPL CALCULATED.3IONS-SCNC: 13 MMOL/L (ref 7–16)
ANION GAP SERPL CALCULATED.3IONS-SCNC: 14 MMOL/L (ref 7–16)
AST SERPL-CCNC: 19 U/L (ref 0–35)
B PARAP IS1001 DNA NPH QL NAA+NON-PROBE: NOT DETECTED
B PERT DNA SPEC QL NAA+PROBE: NOT DETECTED
B-OH-BUTYR SERPL-MCNC: 0.23 MMOL/L (ref 0.02–0.27)
BACTERIA URNS QL MICRO: ABNORMAL
BASOPHILS # BLD: 0 K/UL (ref 0–0.2)
BASOPHILS NFR BLD: 0 % (ref 0–2)
BILIRUB SERPL-MCNC: 0.7 MG/DL (ref 0–1.2)
BILIRUB UR QL STRIP: NEGATIVE
BUN SERPL-MCNC: 78 MG/DL (ref 8–23)
BUN SERPL-MCNC: 79 MG/DL (ref 8–23)
C PNEUM DNA NPH QL NAA+NON-PROBE: NOT DETECTED
CALCIUM SERPL-MCNC: 8.8 MG/DL (ref 8.8–10.2)
CALCIUM SERPL-MCNC: 9.6 MG/DL (ref 8.8–10.2)
CASTS #/AREA URNS LPF: ABNORMAL /LPF
CASTS #/AREA URNS LPF: ABNORMAL /LPF
CHLORIDE SERPL-SCNC: 106 MMOL/L (ref 98–107)
CHLORIDE SERPL-SCNC: 109 MMOL/L (ref 98–107)
CK SERPL-CCNC: 135 U/L (ref 0–170)
CLARITY UR: ABNORMAL
CO2 SERPL-SCNC: 12 MMOL/L (ref 22–29)
CO2 SERPL-SCNC: 12 MMOL/L (ref 22–29)
COLOR UR: YELLOW
CREAT SERPL-MCNC: 3.4 MG/DL (ref 0.5–1)
CREAT SERPL-MCNC: 3.9 MG/DL (ref 0.5–1)
CREAT UR-MCNC: 151 MG/DL (ref 29–226)
CREAT UR-MCNC: 152 MG/DL (ref 29–226)
CRITICAL ACTION: NORMAL
CRITICAL NOTIFICATION DATE/TIME: NORMAL
CRITICAL NOTIFICATION: NORMAL
CRITICAL VALUE READ BACK: YES
EKG ATRIAL RATE: 81 BPM
EKG P AXIS: 55 DEGREES
EKG P-R INTERVAL: 162 MS
EKG Q-T INTERVAL: 386 MS
EKG QRS DURATION: 92 MS
EKG QTC CALCULATION (BAZETT): 448 MS
EKG R AXIS: -21 DEGREES
EKG T AXIS: 18 DEGREES
EKG VENTRICULAR RATE: 81 BPM
EOSINOPHIL # BLD: 0.66 K/UL (ref 0.05–0.5)
EOSINOPHILS RELATIVE PERCENT: 6 % (ref 0–6)
EPI CELLS #/AREA URNS HPF: ABNORMAL /HPF
ERYTHROCYTE [DISTWIDTH] IN BLOOD BY AUTOMATED COUNT: 12.6 % (ref 11.5–15)
FLUAV RNA NPH QL NAA+NON-PROBE: NOT DETECTED
FLUBV RNA NPH QL NAA+NON-PROBE: NOT DETECTED
GFR, ESTIMATED: 12 ML/MIN/1.73M2
GFR, ESTIMATED: 14 ML/MIN/1.73M2
GLUCOSE BLD-MCNC: 93 MG/DL (ref 74–99)
GLUCOSE SERPL-MCNC: 114 MG/DL (ref 74–99)
GLUCOSE SERPL-MCNC: 92 MG/DL (ref 74–99)
GLUCOSE UR STRIP-MCNC: 100 MG/DL
HADV DNA NPH QL NAA+NON-PROBE: NOT DETECTED
HCOV 229E RNA NPH QL NAA+NON-PROBE: NOT DETECTED
HCOV HKU1 RNA NPH QL NAA+NON-PROBE: NOT DETECTED
HCOV NL63 RNA NPH QL NAA+NON-PROBE: NOT DETECTED
HCOV OC43 RNA NPH QL NAA+NON-PROBE: NOT DETECTED
HCT VFR BLD AUTO: 36.7 % (ref 34–48)
HGB BLD-MCNC: 11.8 G/DL (ref 11.5–15.5)
HGB UR QL STRIP.AUTO: NEGATIVE
HMPV RNA NPH QL NAA+NON-PROBE: NOT DETECTED
HPIV1 RNA NPH QL NAA+NON-PROBE: NOT DETECTED
HPIV2 RNA NPH QL NAA+NON-PROBE: NOT DETECTED
HPIV3 RNA NPH QL NAA+NON-PROBE: NOT DETECTED
HPIV4 RNA NPH QL NAA+NON-PROBE: NOT DETECTED
KETONES UR STRIP-MCNC: NEGATIVE MG/DL
LACTATE BLDV-SCNC: 0.7 MMOL/L (ref 0.5–2.2)
LACTATE BLDV-SCNC: 1 MMOL/L (ref 0.5–2.2)
LEUKOCYTE ESTERASE UR QL STRIP: ABNORMAL
LIPASE SERPL-CCNC: 164 U/L (ref 13–60)
LYMPHOCYTES NFR BLD: 0.85 K/UL (ref 1.5–4)
LYMPHOCYTES RELATIVE PERCENT: 8 % (ref 20–42)
M PNEUMO DNA NPH QL NAA+NON-PROBE: NOT DETECTED
MCH RBC QN AUTO: 30.4 PG (ref 26–35)
MCHC RBC AUTO-ENTMCNC: 32.2 G/DL (ref 32–34.5)
MCV RBC AUTO: 94.6 FL (ref 80–99.9)
MONOCYTES NFR BLD: 0.38 K/UL (ref 0.1–0.95)
MONOCYTES NFR BLD: 3 % (ref 2–12)
NEGATIVE BASE EXCESS, ART: 16.9 MMOL/L
NEUTROPHILS NFR BLD: 83 % (ref 43–80)
NEUTS SEG NFR BLD: 9.3 K/UL (ref 1.8–7.3)
NITRITE UR QL STRIP: NEGATIVE
NUCLEATED RED BLOOD CELLS: 1 PER 100 WBC
O2 DELIVERY DEVICE: ABNORMAL
PH UR STRIP: 5.5 [PH] (ref 5–8)
PH VENOUS: 7.1 (ref 7.35–7.45)
PLATELET # BLD AUTO: 241 K/UL (ref 130–450)
PMV BLD AUTO: 10.1 FL (ref 7–12)
POC HCO3: 10.4 MMOL/L (ref 22–26)
POC O2 SATURATION: 94.2 % (ref 92–98.5)
POC PCO2: 29.2 MM HG (ref 35–45)
POC PH: 7.16 (ref 7.35–7.45)
POC PO2: 89.4 MM HG (ref 60–80)
POTASSIUM SERPL-SCNC: 5 MMOL/L (ref 3.5–5.1)
POTASSIUM SERPL-SCNC: 5.3 MMOL/L (ref 3.5–5.1)
PROT SERPL-MCNC: 8 G/DL (ref 6.4–8.3)
PROT UR STRIP-MCNC: ABNORMAL MG/DL
RBC # BLD AUTO: 3.88 M/UL (ref 3.5–5.5)
RBC # BLD: ABNORMAL 10*6/UL
RBC #/AREA URNS HPF: ABNORMAL /HPF
RSV RNA NPH QL NAA+NON-PROBE: NOT DETECTED
RV+EV RNA NPH QL NAA+NON-PROBE: NOT DETECTED
SARS-COV-2 RNA NPH QL NAA+NON-PROBE: NOT DETECTED
SODIUM SERPL-SCNC: 132 MMOL/L (ref 136–145)
SODIUM SERPL-SCNC: 133 MMOL/L (ref 136–145)
SODIUM UR-SCNC: 35 MMOL/L
SP GR UR STRIP: 1.01 (ref 1–1.03)
SPECIMEN DESCRIPTION: NORMAL
T4 FREE SERPL-MCNC: 1 NG/DL (ref 0.9–1.7)
TOTAL PROTEIN, URINE: 24 MG/DL (ref 0–12)
TROPONIN I SERPL HS-MCNC: 38 NG/L (ref 0–14)
TROPONIN I SERPL HS-MCNC: 41 NG/L (ref 0–14)
TROPONIN I SERPL HS-MCNC: 43 NG/L (ref 0–14)
TROPONIN I SERPL HS-MCNC: 45 NG/L (ref 0–14)
TSH SERPL DL<=0.05 MIU/L-ACNC: 3.09 UIU/ML (ref 0.27–4.2)
URINE TOTAL PROTEIN CREATININE RATIO: 0.16 (ref 0–0.2)
UROBILINOGEN UR STRIP-ACNC: 0.2 EU/DL (ref 0–1)
UUN UR-MCNC: 518 MG/DL (ref 800–1666)
WBC #/AREA URNS HPF: ABNORMAL /HPF
WBC OTHER # BLD: 11.2 K/UL (ref 4.5–11.5)

## 2025-09-02 PROCEDURE — 85025 COMPLETE CBC W/AUTO DIFF WBC: CPT

## 2025-09-02 PROCEDURE — 96372 THER/PROPH/DIAG INJ SC/IM: CPT

## 2025-09-02 PROCEDURE — 82043 UR ALBUMIN QUANTITATIVE: CPT

## 2025-09-02 PROCEDURE — 84443 ASSAY THYROID STIM HORMONE: CPT

## 2025-09-02 PROCEDURE — 96361 HYDRATE IV INFUSION ADD-ON: CPT

## 2025-09-02 PROCEDURE — 81001 URINALYSIS AUTO W/SCOPE: CPT

## 2025-09-02 PROCEDURE — 84300 ASSAY OF URINE SODIUM: CPT

## 2025-09-02 PROCEDURE — 2580000003 HC RX 258: Performed by: PHYSICIAN ASSISTANT

## 2025-09-02 PROCEDURE — 87324 CLOSTRIDIUM AG IA: CPT

## 2025-09-02 PROCEDURE — 74176 CT ABD & PELVIS W/O CONTRAST: CPT

## 2025-09-02 PROCEDURE — 87040 BLOOD CULTURE FOR BACTERIA: CPT

## 2025-09-02 PROCEDURE — 87425 ROTAVIRUS AG IA: CPT

## 2025-09-02 PROCEDURE — 2500000003 HC RX 250 WO HCPCS: Performed by: FAMILY MEDICINE

## 2025-09-02 PROCEDURE — 96365 THER/PROPH/DIAG IV INF INIT: CPT

## 2025-09-02 PROCEDURE — 6370000000 HC RX 637 (ALT 250 FOR IP): Performed by: FAMILY MEDICINE

## 2025-09-02 PROCEDURE — 87086 URINE CULTURE/COLONY COUNT: CPT

## 2025-09-02 PROCEDURE — 96376 TX/PRO/DX INJ SAME DRUG ADON: CPT

## 2025-09-02 PROCEDURE — 6360000002 HC RX W HCPCS: Performed by: FAMILY MEDICINE

## 2025-09-02 PROCEDURE — 87046 STOOL CULTR AEROBIC BACT EA: CPT

## 2025-09-02 PROCEDURE — 2060000000 HC ICU INTERMEDIATE R&B

## 2025-09-02 PROCEDURE — 84156 ASSAY OF PROTEIN URINE: CPT

## 2025-09-02 PROCEDURE — 87449 NOS EACH ORGANISM AG IA: CPT

## 2025-09-02 PROCEDURE — 82800 BLOOD PH: CPT

## 2025-09-02 PROCEDURE — 82803 BLOOD GASES ANY COMBINATION: CPT

## 2025-09-02 PROCEDURE — 83690 ASSAY OF LIPASE: CPT

## 2025-09-02 PROCEDURE — 2580000003 HC RX 258: Performed by: FAMILY MEDICINE

## 2025-09-02 PROCEDURE — 84484 ASSAY OF TROPONIN QUANT: CPT

## 2025-09-02 PROCEDURE — 80053 COMPREHEN METABOLIC PANEL: CPT

## 2025-09-02 PROCEDURE — 80048 BASIC METABOLIC PNL TOTAL CA: CPT

## 2025-09-02 PROCEDURE — 71045 X-RAY EXAM CHEST 1 VIEW: CPT

## 2025-09-02 PROCEDURE — 82010 KETONE BODYS QUAN: CPT

## 2025-09-02 PROCEDURE — 87045 FECES CULTURE AEROBIC BACT: CPT

## 2025-09-02 PROCEDURE — 99285 EMERGENCY DEPT VISIT HI MDM: CPT

## 2025-09-02 PROCEDURE — 82550 ASSAY OF CK (CPK): CPT

## 2025-09-02 PROCEDURE — 2580000003 HC RX 258

## 2025-09-02 PROCEDURE — 82962 GLUCOSE BLOOD TEST: CPT

## 2025-09-02 PROCEDURE — 87077 CULTURE AEROBIC IDENTIFY: CPT

## 2025-09-02 PROCEDURE — 82570 ASSAY OF URINE CREATININE: CPT

## 2025-09-02 PROCEDURE — 84439 ASSAY OF FREE THYROXINE: CPT

## 2025-09-02 PROCEDURE — 96375 TX/PRO/DX INJ NEW DRUG ADDON: CPT

## 2025-09-02 PROCEDURE — 87427 SHIGA-LIKE TOXIN AG IA: CPT

## 2025-09-02 PROCEDURE — 87329 GIARDIA AG IA: CPT

## 2025-09-02 PROCEDURE — 84540 ASSAY OF URINE/UREA-N: CPT

## 2025-09-02 PROCEDURE — 6360000002 HC RX W HCPCS: Performed by: PHYSICIAN ASSISTANT

## 2025-09-02 PROCEDURE — 83605 ASSAY OF LACTIC ACID: CPT

## 2025-09-02 PROCEDURE — 0202U NFCT DS 22 TRGT SARS-COV-2: CPT

## 2025-09-02 PROCEDURE — 87328 CRYPTOSPORIDIUM AG IA: CPT

## 2025-09-02 PROCEDURE — 6370000000 HC RX 637 (ALT 250 FOR IP): Performed by: INTERNAL MEDICINE

## 2025-09-02 PROCEDURE — 96366 THER/PROPH/DIAG IV INF ADDON: CPT

## 2025-09-02 RX ORDER — ONDANSETRON 2 MG/ML
4 INJECTION INTRAMUSCULAR; INTRAVENOUS ONCE
Status: COMPLETED | OUTPATIENT
Start: 2025-09-02 | End: 2025-09-02

## 2025-09-02 RX ORDER — ONDANSETRON 2 MG/ML
4 INJECTION INTRAMUSCULAR; INTRAVENOUS EVERY 6 HOURS PRN
Status: DISCONTINUED | OUTPATIENT
Start: 2025-09-02 | End: 2025-09-05 | Stop reason: HOSPADM

## 2025-09-02 RX ORDER — LISINOPRIL 5 MG/1
5 TABLET ORAL DAILY
Status: DISCONTINUED | OUTPATIENT
Start: 2025-09-02 | End: 2025-09-05 | Stop reason: HOSPADM

## 2025-09-02 RX ORDER — POTASSIUM CHLORIDE 7.45 MG/ML
10 INJECTION INTRAVENOUS PRN
Status: DISCONTINUED | OUTPATIENT
Start: 2025-09-02 | End: 2025-09-05 | Stop reason: HOSPADM

## 2025-09-02 RX ORDER — HEPARIN SODIUM 5000 [USP'U]/ML
5000 INJECTION, SOLUTION INTRAVENOUS; SUBCUTANEOUS EVERY 8 HOURS SCHEDULED
Status: DISCONTINUED | OUTPATIENT
Start: 2025-09-02 | End: 2025-09-05 | Stop reason: HOSPADM

## 2025-09-02 RX ORDER — SODIUM CHLORIDE, SODIUM LACTATE, POTASSIUM CHLORIDE, AND CALCIUM CHLORIDE .6; .31; .03; .02 G/100ML; G/100ML; G/100ML; G/100ML
1000 INJECTION, SOLUTION INTRAVENOUS ONCE
Status: COMPLETED | OUTPATIENT
Start: 2025-09-02 | End: 2025-09-02

## 2025-09-02 RX ORDER — SODIUM CHLORIDE 0.9 % (FLUSH) 0.9 %
5-40 SYRINGE (ML) INJECTION PRN
Status: DISCONTINUED | OUTPATIENT
Start: 2025-09-02 | End: 2025-09-05 | Stop reason: HOSPADM

## 2025-09-02 RX ORDER — ONDANSETRON 4 MG/1
4 TABLET, ORALLY DISINTEGRATING ORAL EVERY 8 HOURS PRN
Status: DISCONTINUED | OUTPATIENT
Start: 2025-09-02 | End: 2025-09-05 | Stop reason: HOSPADM

## 2025-09-02 RX ORDER — 0.9 % SODIUM CHLORIDE 0.9 %
1000 INTRAVENOUS SOLUTION INTRAVENOUS ONCE
Status: DISCONTINUED | OUTPATIENT
Start: 2025-09-02 | End: 2025-09-05 | Stop reason: HOSPADM

## 2025-09-02 RX ORDER — 0.9 % SODIUM CHLORIDE 0.9 %
1000 INTRAVENOUS SOLUTION INTRAVENOUS ONCE
Status: COMPLETED | OUTPATIENT
Start: 2025-09-02 | End: 2025-09-02

## 2025-09-02 RX ORDER — MAGNESIUM SULFATE IN WATER 40 MG/ML
2000 INJECTION, SOLUTION INTRAVENOUS PRN
Status: DISCONTINUED | OUTPATIENT
Start: 2025-09-02 | End: 2025-09-05 | Stop reason: HOSPADM

## 2025-09-02 RX ORDER — ACETAMINOPHEN 325 MG/1
650 TABLET ORAL EVERY 6 HOURS PRN
Status: DISCONTINUED | OUTPATIENT
Start: 2025-09-02 | End: 2025-09-05 | Stop reason: HOSPADM

## 2025-09-02 RX ORDER — ATORVASTATIN CALCIUM 40 MG/1
40 TABLET, FILM COATED ORAL DAILY
Status: DISCONTINUED | OUTPATIENT
Start: 2025-09-03 | End: 2025-09-05 | Stop reason: HOSPADM

## 2025-09-02 RX ORDER — SODIUM BICARBONATE 650 MG/1
1300 TABLET ORAL 2 TIMES DAILY
Status: DISCONTINUED | OUTPATIENT
Start: 2025-09-02 | End: 2025-09-05 | Stop reason: HOSPADM

## 2025-09-02 RX ORDER — SODIUM CHLORIDE 9 MG/ML
INJECTION, SOLUTION INTRAVENOUS PRN
Status: DISCONTINUED | OUTPATIENT
Start: 2025-09-02 | End: 2025-09-05 | Stop reason: HOSPADM

## 2025-09-02 RX ORDER — DEXTROSE MONOHYDRATE 100 MG/ML
INJECTION, SOLUTION INTRAVENOUS CONTINUOUS PRN
Status: DISCONTINUED | OUTPATIENT
Start: 2025-09-02 | End: 2025-09-05 | Stop reason: HOSPADM

## 2025-09-02 RX ORDER — METFORMIN HYDROCHLORIDE 500 MG/1
500 TABLET, EXTENDED RELEASE ORAL
Status: DISCONTINUED | OUTPATIENT
Start: 2025-09-03 | End: 2025-09-05 | Stop reason: HOSPADM

## 2025-09-02 RX ORDER — INSULIN LISPRO 100 [IU]/ML
0-4 INJECTION, SOLUTION INTRAVENOUS; SUBCUTANEOUS
Status: DISCONTINUED | OUTPATIENT
Start: 2025-09-02 | End: 2025-09-02

## 2025-09-02 RX ORDER — METRONIDAZOLE 500 MG/100ML
500 INJECTION, SOLUTION INTRAVENOUS EVERY 8 HOURS
Status: DISPENSED | OUTPATIENT
Start: 2025-09-02 | End: 2025-09-05

## 2025-09-02 RX ORDER — SODIUM CHLORIDE 0.9 % (FLUSH) 0.9 %
5-40 SYRINGE (ML) INJECTION EVERY 12 HOURS SCHEDULED
Status: DISCONTINUED | OUTPATIENT
Start: 2025-09-02 | End: 2025-09-05 | Stop reason: HOSPADM

## 2025-09-02 RX ORDER — LEVOTHYROXINE SODIUM 25 UG/1
25 TABLET ORAL DAILY
Status: DISCONTINUED | OUTPATIENT
Start: 2025-09-03 | End: 2025-09-05 | Stop reason: HOSPADM

## 2025-09-02 RX ORDER — METOPROLOL SUCCINATE 25 MG/1
12.5 TABLET, EXTENDED RELEASE ORAL DAILY
Status: DISCONTINUED | OUTPATIENT
Start: 2025-09-03 | End: 2025-09-05 | Stop reason: HOSPADM

## 2025-09-02 RX ORDER — LACTOBACILLUS RHAMNOSUS GG 10B CELL
1 CAPSULE ORAL
Status: DISCONTINUED | OUTPATIENT
Start: 2025-09-03 | End: 2025-09-05 | Stop reason: HOSPADM

## 2025-09-02 RX ORDER — ACETAMINOPHEN 650 MG/1
650 SUPPOSITORY RECTAL EVERY 6 HOURS PRN
Status: DISCONTINUED | OUTPATIENT
Start: 2025-09-02 | End: 2025-09-05 | Stop reason: HOSPADM

## 2025-09-02 RX ORDER — FOLIC ACID 1 MG/1
1000 TABLET ORAL DAILY
Status: DISCONTINUED | OUTPATIENT
Start: 2025-09-03 | End: 2025-09-05 | Stop reason: HOSPADM

## 2025-09-02 RX ORDER — POTASSIUM CHLORIDE 1500 MG/1
40 TABLET, EXTENDED RELEASE ORAL PRN
Status: DISCONTINUED | OUTPATIENT
Start: 2025-09-02 | End: 2025-09-05 | Stop reason: HOSPADM

## 2025-09-02 RX ORDER — GLUCAGON 1 MG/ML
1 KIT INJECTION PRN
Status: DISCONTINUED | OUTPATIENT
Start: 2025-09-02 | End: 2025-09-05 | Stop reason: HOSPADM

## 2025-09-02 RX ORDER — INSULIN LISPRO 100 [IU]/ML
0-4 INJECTION, SOLUTION INTRAVENOUS; SUBCUTANEOUS
Status: DISCONTINUED | OUTPATIENT
Start: 2025-09-02 | End: 2025-09-05 | Stop reason: HOSPADM

## 2025-09-02 RX ORDER — POLYETHYLENE GLYCOL 3350 17 G/17G
17 POWDER, FOR SOLUTION ORAL DAILY PRN
Status: DISCONTINUED | OUTPATIENT
Start: 2025-09-02 | End: 2025-09-05 | Stop reason: HOSPADM

## 2025-09-02 RX ORDER — HEPARIN 100 UNIT/ML
100 SYRINGE INTRAVENOUS PRN
Status: DISCONTINUED | OUTPATIENT
Start: 2025-09-02 | End: 2025-09-05 | Stop reason: HOSPADM

## 2025-09-02 RX ORDER — SODIUM CHLORIDE, SODIUM LACTATE, POTASSIUM CHLORIDE, CALCIUM CHLORIDE 600; 310; 30; 20 MG/100ML; MG/100ML; MG/100ML; MG/100ML
INJECTION, SOLUTION INTRAVENOUS
Status: COMPLETED
Start: 2025-09-02 | End: 2025-09-02

## 2025-09-02 RX ADMIN — ONDANSETRON 4 MG: 2 INJECTION, SOLUTION INTRAMUSCULAR; INTRAVENOUS at 21:23

## 2025-09-02 RX ADMIN — SODIUM CHLORIDE, SODIUM LACTATE, POTASSIUM CHLORIDE, AND CALCIUM CHLORIDE 1000 ML: .6; .31; .03; .02 INJECTION, SOLUTION INTRAVENOUS at 13:50

## 2025-09-02 RX ADMIN — SODIUM BICARBONATE 650 MG TABLET 1300 MG: at 22:02

## 2025-09-02 RX ADMIN — Medication 6 MG: at 22:01

## 2025-09-02 RX ADMIN — HEPARIN SODIUM 5000 UNITS: 5000 INJECTION, SOLUTION INTRAVENOUS; SUBCUTANEOUS at 22:41

## 2025-09-02 RX ADMIN — METRONIDAZOLE 500 MG: 500 INJECTION, SOLUTION INTRAVENOUS at 19:34

## 2025-09-02 RX ADMIN — Medication 10 ML: at 21:30

## 2025-09-02 RX ADMIN — SODIUM BICARBONATE: 84 INJECTION, SOLUTION INTRAVENOUS at 23:47

## 2025-09-02 RX ADMIN — ONDANSETRON 4 MG: 2 INJECTION, SOLUTION INTRAMUSCULAR; INTRAVENOUS at 12:24

## 2025-09-02 RX ADMIN — SODIUM CHLORIDE 1000 ML: 0.9 INJECTION, SOLUTION INTRAVENOUS at 12:20

## 2025-09-03 LAB
ALBUMIN SERPL-MCNC: 3.5 G/DL (ref 3.5–5.2)
ALLEN TEST: POSITIVE
ALP SERPL-CCNC: 236 U/L (ref 35–104)
ALT SERPL-CCNC: 12 U/L (ref 0–35)
ANION GAP SERPL CALCULATED.3IONS-SCNC: 12 MMOL/L (ref 7–16)
AST SERPL-CCNC: 14 U/L (ref 0–35)
BASOPHILS # BLD: 0 K/UL (ref 0–0.2)
BASOPHILS NFR BLD: 0 % (ref 0–2)
BILIRUB SERPL-MCNC: 0.4 MG/DL (ref 0–1.2)
BUN SERPL-MCNC: 66 MG/DL (ref 8–23)
C DIFF GDH + TOXINS A+B STL QL IA.RAPID: NEGATIVE
C PARVUM AG STL QL IA: NEGATIVE
CALCIUM SERPL-MCNC: 8.7 MG/DL (ref 8.8–10.2)
CHLORIDE SERPL-SCNC: 115 MMOL/L (ref 98–107)
CO2 SERPL-SCNC: 12 MMOL/L (ref 22–29)
CREAT SERPL-MCNC: 2.5 MG/DL (ref 0.5–1)
CREAT UR-MCNC: 152 MG/DL (ref 29–226)
EOSINOPHIL # BLD: 0.04 K/UL (ref 0.05–0.5)
EOSINOPHILS RELATIVE PERCENT: 1 % (ref 0–6)
ERYTHROCYTE [DISTWIDTH] IN BLOOD BY AUTOMATED COUNT: 12.8 % (ref 11.5–15)
G LAMBLIA AG STL QL IA: NEGATIVE
GFR, ESTIMATED: 21 ML/MIN/1.73M2
GLUCOSE BLD-MCNC: 118 MG/DL (ref 74–99)
GLUCOSE BLD-MCNC: 134 MG/DL (ref 74–99)
GLUCOSE SERPL-MCNC: 120 MG/DL (ref 74–99)
HCT VFR BLD AUTO: 29.3 % (ref 34–48)
HGB BLD-MCNC: 9.4 G/DL (ref 11.5–15.5)
LYMPHOCYTES NFR BLD: 0.13 K/UL (ref 1.5–4)
LYMPHOCYTES RELATIVE PERCENT: 3 % (ref 20–42)
MCH RBC QN AUTO: 30.1 PG (ref 26–35)
MCHC RBC AUTO-ENTMCNC: 32.1 G/DL (ref 32–34.5)
MCV RBC AUTO: 93.9 FL (ref 80–99.9)
MICROALBUMIN UR-MCNC: 38 MG/L (ref 0–20)
MICROALBUMIN/CREAT UR-RTO: 25 MCG/MG CREAT (ref 0–30)
MICROORGANISM SPEC CULT: ABNORMAL
MICROORGANISM SPEC CULT: ABNORMAL
MONOCYTES NFR BLD: 0.17 K/UL (ref 0.1–0.95)
MONOCYTES NFR BLD: 4 % (ref 2–12)
NEGATIVE BASE EXCESS, ART: 14.2 MMOL/L
NEUTROPHILS NFR BLD: 93 % (ref 43–80)
NEUTS SEG NFR BLD: 4.36 K/UL (ref 1.8–7.3)
PHOSPHATE SERPL-MCNC: 4 MG/DL (ref 2.5–4.5)
PLATELET # BLD AUTO: 171 K/UL (ref 130–450)
PMV BLD AUTO: 10.1 FL (ref 7–12)
POC HCO3: 11.2 MMOL/L (ref 22–26)
POC O2 SATURATION: 97.9 % (ref 92–98.5)
POC PCO2: 24.5 MM HG (ref 35–45)
POC PH: 7.27 (ref 7.35–7.45)
POC PO2: 114.4 MM HG (ref 60–80)
POTASSIUM SERPL-SCNC: 4.4 MMOL/L (ref 3.5–5.1)
PROT SERPL-MCNC: 6.4 G/DL (ref 6.4–8.3)
RBC # BLD AUTO: 3.12 M/UL (ref 3.5–5.5)
RBC # BLD: NORMAL 10*6/UL
ROTAVIRUS ANTIGEN: NEGATIVE
SAMPLE SITE: ABNORMAL
SERVICE CMNT-IMP: ABNORMAL
SODIUM SERPL-SCNC: 139 MMOL/L (ref 136–145)
SOURCE, 60200063: NORMAL
SOURCE, 60200063: NORMAL
SOURCE: NORMAL
SPECIMEN DESCRIPTION: ABNORMAL
SPECIMEN DESCRIPTION: NORMAL
WBC OTHER # BLD: 4.7 K/UL (ref 4.5–11.5)

## 2025-09-03 PROCEDURE — 6360000002 HC RX W HCPCS: Performed by: FAMILY MEDICINE

## 2025-09-03 PROCEDURE — 6370000000 HC RX 637 (ALT 250 FOR IP): Performed by: FAMILY MEDICINE

## 2025-09-03 PROCEDURE — 6370000000 HC RX 637 (ALT 250 FOR IP): Performed by: INTERNAL MEDICINE

## 2025-09-03 PROCEDURE — 82962 GLUCOSE BLOOD TEST: CPT

## 2025-09-03 PROCEDURE — 84100 ASSAY OF PHOSPHORUS: CPT

## 2025-09-03 PROCEDURE — 6360000002 HC RX W HCPCS: Performed by: STUDENT IN AN ORGANIZED HEALTH CARE EDUCATION/TRAINING PROGRAM

## 2025-09-03 PROCEDURE — 2500000003 HC RX 250 WO HCPCS: Performed by: FAMILY MEDICINE

## 2025-09-03 PROCEDURE — 99233 SBSQ HOSP IP/OBS HIGH 50: CPT | Performed by: INTERNAL MEDICINE

## 2025-09-03 PROCEDURE — 85025 COMPLETE CBC W/AUTO DIFF WBC: CPT

## 2025-09-03 PROCEDURE — 2580000003 HC RX 258: Performed by: FAMILY MEDICINE

## 2025-09-03 PROCEDURE — 80053 COMPREHEN METABOLIC PANEL: CPT

## 2025-09-03 PROCEDURE — 82803 BLOOD GASES ANY COMBINATION: CPT

## 2025-09-03 PROCEDURE — 2060000000 HC ICU INTERMEDIATE R&B

## 2025-09-03 RX ORDER — PROCHLORPERAZINE EDISYLATE 5 MG/ML
5 INJECTION INTRAMUSCULAR; INTRAVENOUS ONCE
Status: COMPLETED | OUTPATIENT
Start: 2025-09-03 | End: 2025-09-03

## 2025-09-03 RX ADMIN — FOLIC ACID 1000 MCG: 1 TABLET ORAL at 13:45

## 2025-09-03 RX ADMIN — Medication 1 CAPSULE: at 13:46

## 2025-09-03 RX ADMIN — Medication 10 ML: at 20:09

## 2025-09-03 RX ADMIN — METOPROLOL SUCCINATE 12.5 MG: 25 TABLET, EXTENDED RELEASE ORAL at 13:47

## 2025-09-03 RX ADMIN — Medication 6 MG: at 20:09

## 2025-09-03 RX ADMIN — METRONIDAZOLE 500 MG: 500 INJECTION, SOLUTION INTRAVENOUS at 15:56

## 2025-09-03 RX ADMIN — HEPARIN SODIUM 5000 UNITS: 5000 INJECTION, SOLUTION INTRAVENOUS; SUBCUTANEOUS at 22:01

## 2025-09-03 RX ADMIN — LEVOTHYROXINE SODIUM 25 MCG: 0.03 TABLET ORAL at 13:46

## 2025-09-03 RX ADMIN — Medication 1000 ML: at 23:20

## 2025-09-03 RX ADMIN — SODIUM BICARBONATE 650 MG TABLET 1300 MG: at 13:46

## 2025-09-03 RX ADMIN — HEPARIN SODIUM 5000 UNITS: 5000 INJECTION, SOLUTION INTRAVENOUS; SUBCUTANEOUS at 06:32

## 2025-09-03 RX ADMIN — SODIUM BICARBONATE: 84 INJECTION, SOLUTION INTRAVENOUS at 20:12

## 2025-09-03 RX ADMIN — SODIUM BICARBONATE 650 MG TABLET 1300 MG: at 20:09

## 2025-09-03 RX ADMIN — PROCHLORPERAZINE EDISYLATE 5 MG: 5 INJECTION INTRAMUSCULAR; INTRAVENOUS at 01:55

## 2025-09-03 RX ADMIN — ATORVASTATIN CALCIUM 40 MG: 40 TABLET, FILM COATED ORAL at 13:46

## 2025-09-03 ASSESSMENT — PAIN SCALES - GENERAL
PAINLEVEL_OUTOF10: 0
PAINLEVEL_OUTOF10: 0

## 2025-09-04 PROBLEM — N18.31 ACUTE KIDNEY INJURY SUPERIMPOSED ON STAGE 3A CHRONIC KIDNEY DISEASE (HCC): Status: ACTIVE | Noted: 2025-09-04

## 2025-09-04 PROBLEM — N17.9 ACUTE KIDNEY INJURY SUPERIMPOSED ON STAGE 3A CHRONIC KIDNEY DISEASE (HCC): Status: ACTIVE | Noted: 2025-09-04

## 2025-09-04 LAB
ALBUMIN SERPL-MCNC: 3.2 G/DL (ref 3.5–5.2)
ALP SERPL-CCNC: 195 U/L (ref 35–104)
ALT SERPL-CCNC: 10 U/L (ref 0–35)
ANION GAP SERPL CALCULATED.3IONS-SCNC: 10 MMOL/L (ref 7–16)
ANION GAP SERPL CALCULATED.3IONS-SCNC: 12 MMOL/L (ref 7–16)
AST SERPL-CCNC: 16 U/L (ref 0–35)
BASOPHILS # BLD: 0 K/UL (ref 0–0.2)
BASOPHILS NFR BLD: 0 % (ref 0–2)
BILIRUB SERPL-MCNC: 0.3 MG/DL (ref 0–1.2)
BUN SERPL-MCNC: 33 MG/DL (ref 8–23)
BUN SERPL-MCNC: 48 MG/DL (ref 8–23)
CALCIUM SERPL-MCNC: 7.8 MG/DL (ref 8.8–10.2)
CALCIUM SERPL-MCNC: 8 MG/DL (ref 8.8–10.2)
CHLORIDE SERPL-SCNC: 104 MMOL/L (ref 98–107)
CHLORIDE SERPL-SCNC: 109 MMOL/L (ref 98–107)
CO2 SERPL-SCNC: 21 MMOL/L (ref 22–29)
CO2 SERPL-SCNC: 27 MMOL/L (ref 22–29)
CREAT SERPL-MCNC: 1.6 MG/DL (ref 0.5–1)
CREAT SERPL-MCNC: 1.9 MG/DL (ref 0.5–1)
EOSINOPHIL # BLD: 0.13 K/UL (ref 0.05–0.5)
EOSINOPHILS RELATIVE PERCENT: 4 % (ref 0–6)
ERYTHROCYTE [DISTWIDTH] IN BLOOD BY AUTOMATED COUNT: 12.5 % (ref 11.5–15)
GFR, ESTIMATED: 30 ML/MIN/1.73M2
GFR, ESTIMATED: 37 ML/MIN/1.73M2
GLUCOSE BLD-MCNC: 126 MG/DL (ref 74–99)
GLUCOSE BLD-MCNC: 141 MG/DL (ref 74–99)
GLUCOSE BLD-MCNC: 181 MG/DL (ref 74–99)
GLUCOSE BLD-MCNC: 223 MG/DL (ref 74–99)
GLUCOSE SERPL-MCNC: 143 MG/DL (ref 74–99)
GLUCOSE SERPL-MCNC: 213 MG/DL (ref 74–99)
HCT VFR BLD AUTO: 24 % (ref 34–48)
HCT VFR BLD AUTO: 26.2 % (ref 34–48)
HGB BLD-MCNC: 8.1 G/DL (ref 11.5–15.5)
HGB BLD-MCNC: 8.7 G/DL (ref 11.5–15.5)
LYMPHOCYTES NFR BLD: 0.41 K/UL (ref 1.5–4)
LYMPHOCYTES RELATIVE PERCENT: 12 % (ref 20–42)
MAGNESIUM SERPL-MCNC: 1.7 MG/DL (ref 1.6–2.4)
MCH RBC QN AUTO: 30.9 PG (ref 26–35)
MCHC RBC AUTO-ENTMCNC: 33.8 G/DL (ref 32–34.5)
MCV RBC AUTO: 91.6 FL (ref 80–99.9)
MONOCYTES NFR BLD: 0.16 K/UL (ref 0.1–0.95)
MONOCYTES NFR BLD: 5 % (ref 2–12)
NEUTROPHILS NFR BLD: 80 % (ref 43–80)
NEUTS SEG NFR BLD: 2.81 K/UL (ref 1.8–7.3)
PLATELET # BLD AUTO: 142 K/UL (ref 130–450)
PMV BLD AUTO: 10.3 FL (ref 7–12)
POTASSIUM SERPL-SCNC: 3.3 MMOL/L (ref 3.5–5.1)
POTASSIUM SERPL-SCNC: 3.4 MMOL/L (ref 3.5–5.1)
PROT SERPL-MCNC: 5.7 G/DL (ref 6.4–8.3)
RBC # BLD AUTO: 2.62 M/UL (ref 3.5–5.5)
RBC # BLD: NORMAL 10*6/UL
SODIUM SERPL-SCNC: 140 MMOL/L (ref 136–145)
SODIUM SERPL-SCNC: 142 MMOL/L (ref 136–145)
WBC OTHER # BLD: 3.5 K/UL (ref 4.5–11.5)

## 2025-09-04 PROCEDURE — 80048 BASIC METABOLIC PNL TOTAL CA: CPT

## 2025-09-04 PROCEDURE — 6360000002 HC RX W HCPCS: Performed by: INTERNAL MEDICINE

## 2025-09-04 PROCEDURE — 6360000002 HC RX W HCPCS: Performed by: FAMILY MEDICINE

## 2025-09-04 PROCEDURE — 83735 ASSAY OF MAGNESIUM: CPT

## 2025-09-04 PROCEDURE — 2500000003 HC RX 250 WO HCPCS: Performed by: FAMILY MEDICINE

## 2025-09-04 PROCEDURE — 85018 HEMOGLOBIN: CPT

## 2025-09-04 PROCEDURE — 2060000000 HC ICU INTERMEDIATE R&B

## 2025-09-04 PROCEDURE — 6370000000 HC RX 637 (ALT 250 FOR IP): Performed by: FAMILY MEDICINE

## 2025-09-04 PROCEDURE — 2580000003 HC RX 258: Performed by: FAMILY MEDICINE

## 2025-09-04 PROCEDURE — 99232 SBSQ HOSP IP/OBS MODERATE 35: CPT | Performed by: INTERNAL MEDICINE

## 2025-09-04 PROCEDURE — 85014 HEMATOCRIT: CPT

## 2025-09-04 PROCEDURE — 80053 COMPREHEN METABOLIC PANEL: CPT

## 2025-09-04 PROCEDURE — 6370000000 HC RX 637 (ALT 250 FOR IP): Performed by: INTERNAL MEDICINE

## 2025-09-04 PROCEDURE — 82962 GLUCOSE BLOOD TEST: CPT

## 2025-09-04 PROCEDURE — 85025 COMPLETE CBC W/AUTO DIFF WBC: CPT

## 2025-09-04 RX ORDER — METRONIDAZOLE 500 MG/1
500 TABLET ORAL EVERY 8 HOURS SCHEDULED
Status: DISCONTINUED | OUTPATIENT
Start: 2025-09-05 | End: 2025-09-05 | Stop reason: HOSPADM

## 2025-09-04 RX ORDER — MIDODRINE HYDROCHLORIDE 5 MG/1
5 TABLET ORAL
Status: DISCONTINUED | OUTPATIENT
Start: 2025-09-04 | End: 2025-09-05 | Stop reason: HOSPADM

## 2025-09-04 RX ORDER — MAGNESIUM SULFATE IN WATER 40 MG/ML
2000 INJECTION, SOLUTION INTRAVENOUS ONCE
Status: COMPLETED | OUTPATIENT
Start: 2025-09-04 | End: 2025-09-04

## 2025-09-04 RX ADMIN — MAGNESIUM SULFATE HEPTAHYDRATE 2000 MG: 40 INJECTION, SOLUTION INTRAVENOUS at 12:22

## 2025-09-04 RX ADMIN — SODIUM BICARBONATE 650 MG TABLET 1300 MG: at 20:13

## 2025-09-04 RX ADMIN — HEPARIN 100 UNITS: 100 SYRINGE at 10:05

## 2025-09-04 RX ADMIN — HEPARIN SODIUM 5000 UNITS: 5000 INJECTION, SOLUTION INTRAVENOUS; SUBCUTANEOUS at 22:36

## 2025-09-04 RX ADMIN — MIDODRINE HYDROCHLORIDE 5 MG: 5 TABLET ORAL at 14:02

## 2025-09-04 RX ADMIN — METRONIDAZOLE 500 MG: 500 INJECTION, SOLUTION INTRAVENOUS at 10:33

## 2025-09-04 RX ADMIN — INSULIN LISPRO 1 UNITS: 100 INJECTION, SOLUTION INTRAVENOUS; SUBCUTANEOUS at 20:18

## 2025-09-04 RX ADMIN — INSULIN LISPRO 1 UNITS: 100 INJECTION, SOLUTION INTRAVENOUS; SUBCUTANEOUS at 12:22

## 2025-09-04 RX ADMIN — HEPARIN SODIUM 5000 UNITS: 5000 INJECTION, SOLUTION INTRAVENOUS; SUBCUTANEOUS at 14:02

## 2025-09-04 RX ADMIN — ATORVASTATIN CALCIUM 40 MG: 40 TABLET, FILM COATED ORAL at 09:41

## 2025-09-04 RX ADMIN — SODIUM BICARBONATE: 84 INJECTION, SOLUTION INTRAVENOUS at 17:36

## 2025-09-04 RX ADMIN — SODIUM BICARBONATE 650 MG TABLET 1300 MG: at 09:41

## 2025-09-04 RX ADMIN — Medication 1 CAPSULE: at 09:41

## 2025-09-04 RX ADMIN — POTASSIUM CHLORIDE 40 MEQ: 1500 TABLET, EXTENDED RELEASE ORAL at 09:40

## 2025-09-04 RX ADMIN — METRONIDAZOLE 500 MG: 500 INJECTION, SOLUTION INTRAVENOUS at 02:24

## 2025-09-04 RX ADMIN — Medication 10 ML: at 20:25

## 2025-09-04 RX ADMIN — Medication 6 MG: at 20:13

## 2025-09-04 RX ADMIN — METRONIDAZOLE 500 MG: 500 INJECTION, SOLUTION INTRAVENOUS at 17:37

## 2025-09-04 RX ADMIN — SODIUM BICARBONATE: 84 INJECTION, SOLUTION INTRAVENOUS at 10:30

## 2025-09-04 RX ADMIN — Medication 10 ML: at 09:41

## 2025-09-04 RX ADMIN — LEVOTHYROXINE SODIUM 25 MCG: 0.03 TABLET ORAL at 09:41

## 2025-09-04 RX ADMIN — HEPARIN SODIUM 5000 UNITS: 5000 INJECTION, SOLUTION INTRAVENOUS; SUBCUTANEOUS at 05:46

## 2025-09-04 RX ADMIN — FOLIC ACID 1000 MCG: 1 TABLET ORAL at 09:41

## 2025-09-04 RX ADMIN — SODIUM BICARBONATE: 84 INJECTION, SOLUTION INTRAVENOUS at 05:46

## 2025-09-04 ASSESSMENT — PAIN SCALES - GENERAL
PAINLEVEL_OUTOF10: 0

## 2025-09-05 VITALS
TEMPERATURE: 98.2 F | OXYGEN SATURATION: 96 % | DIASTOLIC BLOOD PRESSURE: 60 MMHG | BODY MASS INDEX: 34.36 KG/M2 | SYSTOLIC BLOOD PRESSURE: 94 MMHG | HEART RATE: 84 BPM | WEIGHT: 175 LBS | HEIGHT: 60 IN | RESPIRATION RATE: 18 BRPM

## 2025-09-05 LAB
ALBUMIN SERPL-MCNC: 2.8 G/DL (ref 3.5–5.2)
ALP SERPL-CCNC: 160 U/L (ref 35–104)
ALT SERPL-CCNC: 8 U/L (ref 0–35)
ANION GAP SERPL CALCULATED.3IONS-SCNC: 8 MMOL/L (ref 7–16)
AST SERPL-CCNC: 16 U/L (ref 0–35)
BASOPHILS # BLD: 0.02 K/UL (ref 0–0.2)
BASOPHILS NFR BLD: 1 % (ref 0–2)
BILIRUB SERPL-MCNC: 0.3 MG/DL (ref 0–1.2)
BUN SERPL-MCNC: 23 MG/DL (ref 8–23)
CALCIUM SERPL-MCNC: 7.3 MG/DL (ref 8.8–10.2)
CHLORIDE SERPL-SCNC: 100 MMOL/L (ref 98–107)
CO2 SERPL-SCNC: 35 MMOL/L (ref 22–29)
CREAT SERPL-MCNC: 1.4 MG/DL (ref 0.5–1)
EOSINOPHIL # BLD: 0.21 K/UL (ref 0.05–0.5)
EOSINOPHILS RELATIVE PERCENT: 8 % (ref 0–6)
ERYTHROCYTE [DISTWIDTH] IN BLOOD BY AUTOMATED COUNT: 12.4 % (ref 11.5–15)
GFR, ESTIMATED: 43 ML/MIN/1.73M2
GLUCOSE BLD-MCNC: 140 MG/DL (ref 74–99)
GLUCOSE BLD-MCNC: 197 MG/DL (ref 74–99)
GLUCOSE SERPL-MCNC: 138 MG/DL (ref 74–99)
HBA1C MFR BLD: 7.5 % (ref 4–5.6)
HCT VFR BLD AUTO: 22.8 % (ref 34–48)
HGB BLD-MCNC: 7.7 G/DL (ref 11.5–15.5)
LYMPHOCYTES NFR BLD: 0.56 K/UL (ref 1.5–4)
LYMPHOCYTES RELATIVE PERCENT: 21 % (ref 20–42)
MAGNESIUM SERPL-MCNC: 1.5 MG/DL (ref 1.6–2.4)
MCH RBC QN AUTO: 30.7 PG (ref 26–35)
MCHC RBC AUTO-ENTMCNC: 33.8 G/DL (ref 32–34.5)
MCV RBC AUTO: 90.8 FL (ref 80–99.9)
MONOCYTES NFR BLD: 0.12 K/UL (ref 0.1–0.95)
MONOCYTES NFR BLD: 4 % (ref 2–12)
NEUTROPHILS NFR BLD: 66 % (ref 43–80)
NEUTS SEG NFR BLD: 1.78 K/UL (ref 1.8–7.3)
NUCLEATED RED BLOOD CELLS: 2 PER 100 WBC
PLATELET # BLD AUTO: 126 K/UL (ref 130–450)
PMV BLD AUTO: 10.8 FL (ref 7–12)
POTASSIUM SERPL-SCNC: 3.1 MMOL/L (ref 3.5–5.1)
PROT SERPL-MCNC: 5.2 G/DL (ref 6.4–8.3)
RBC # BLD AUTO: 2.51 M/UL (ref 3.5–5.5)
RBC # BLD: ABNORMAL 10*6/UL
SODIUM SERPL-SCNC: 143 MMOL/L (ref 136–145)
WBC OTHER # BLD: 2.7 K/UL (ref 4.5–11.5)

## 2025-09-05 PROCEDURE — 99239 HOSP IP/OBS DSCHRG MGMT >30: CPT | Performed by: INTERNAL MEDICINE

## 2025-09-05 PROCEDURE — 85025 COMPLETE CBC W/AUTO DIFF WBC: CPT

## 2025-09-05 PROCEDURE — 6370000000 HC RX 637 (ALT 250 FOR IP): Performed by: INTERNAL MEDICINE

## 2025-09-05 PROCEDURE — 83036 HEMOGLOBIN GLYCOSYLATED A1C: CPT

## 2025-09-05 PROCEDURE — 6370000000 HC RX 637 (ALT 250 FOR IP): Performed by: FAMILY MEDICINE

## 2025-09-05 PROCEDURE — 2580000003 HC RX 258: Performed by: FAMILY MEDICINE

## 2025-09-05 PROCEDURE — 83735 ASSAY OF MAGNESIUM: CPT

## 2025-09-05 PROCEDURE — 80053 COMPREHEN METABOLIC PANEL: CPT

## 2025-09-05 PROCEDURE — 2500000003 HC RX 250 WO HCPCS: Performed by: FAMILY MEDICINE

## 2025-09-05 PROCEDURE — 82962 GLUCOSE BLOOD TEST: CPT

## 2025-09-05 PROCEDURE — 6360000002 HC RX W HCPCS: Performed by: FAMILY MEDICINE

## 2025-09-05 PROCEDURE — 6360000002 HC RX W HCPCS: Performed by: INTERNAL MEDICINE

## 2025-09-05 RX ORDER — LACTOBACILLUS RHAMNOSUS GG 10B CELL
1 CAPSULE ORAL
Qty: 30 CAPSULE | Refills: 0 | Status: SHIPPED | OUTPATIENT
Start: 2025-09-06

## 2025-09-05 RX ORDER — MIDODRINE HYDROCHLORIDE 5 MG/1
5 TABLET ORAL
Qty: 90 TABLET | Refills: 0 | Status: SHIPPED | OUTPATIENT
Start: 2025-09-05 | End: 2025-10-05

## 2025-09-05 RX ORDER — MAGNESIUM SULFATE IN WATER 40 MG/ML
2000 INJECTION, SOLUTION INTRAVENOUS ONCE
Status: DISCONTINUED | OUTPATIENT
Start: 2025-09-05 | End: 2025-09-05 | Stop reason: HOSPADM

## 2025-09-05 RX ADMIN — ONDANSETRON 4 MG: 4 TABLET, ORALLY DISINTEGRATING ORAL at 13:32

## 2025-09-05 RX ADMIN — HEPARIN SODIUM 5000 UNITS: 5000 INJECTION, SOLUTION INTRAVENOUS; SUBCUTANEOUS at 05:28

## 2025-09-05 RX ADMIN — ATORVASTATIN CALCIUM 40 MG: 40 TABLET, FILM COATED ORAL at 09:01

## 2025-09-05 RX ADMIN — METOPROLOL SUCCINATE 12.5 MG: 25 TABLET, EXTENDED RELEASE ORAL at 09:04

## 2025-09-05 RX ADMIN — MAGNESIUM SULFATE HEPTAHYDRATE 2000 MG: 40 INJECTION, SOLUTION INTRAVENOUS at 09:22

## 2025-09-05 RX ADMIN — METRONIDAZOLE 500 MG: 500 INJECTION, SOLUTION INTRAVENOUS at 02:10

## 2025-09-05 RX ADMIN — POTASSIUM CHLORIDE 40 MEQ: 1500 TABLET, EXTENDED RELEASE ORAL at 09:01

## 2025-09-05 RX ADMIN — SODIUM BICARBONATE: 84 INJECTION, SOLUTION INTRAVENOUS at 00:44

## 2025-09-05 RX ADMIN — MIDODRINE HYDROCHLORIDE 5 MG: 5 TABLET ORAL at 09:01

## 2025-09-05 RX ADMIN — METRONIDAZOLE 500 MG: 500 TABLET ORAL at 09:01

## 2025-09-05 RX ADMIN — Medication 1 CAPSULE: at 09:00

## 2025-09-05 RX ADMIN — FOLIC ACID 1000 MCG: 1 TABLET ORAL at 09:01

## 2025-09-05 RX ADMIN — LEVOTHYROXINE SODIUM 25 MCG: 0.03 TABLET ORAL at 09:01

## 2025-09-05 RX ADMIN — MIDODRINE HYDROCHLORIDE 5 MG: 5 TABLET ORAL at 13:32

## 2025-09-05 RX ADMIN — SODIUM BICARBONATE: 84 INJECTION, SOLUTION INTRAVENOUS at 08:28

## 2025-09-05 RX ADMIN — SODIUM BICARBONATE 650 MG TABLET 1300 MG: at 09:01

## 2025-09-05 ASSESSMENT — PAIN SCALES - GENERAL: PAINLEVEL_OUTOF10: 0

## 2025-09-06 LAB
MICROORGANISM SPEC CULT: NORMAL
MICROORGANISM SPEC CULT: NORMAL
SPECIMEN DESCRIPTION: NORMAL

## 2025-09-07 LAB
MICROORGANISM SPEC CULT: NORMAL
MICROORGANISM SPEC CULT: NORMAL
SERVICE CMNT-IMP: NORMAL
SERVICE CMNT-IMP: NORMAL
SPECIMEN DESCRIPTION: NORMAL
SPECIMEN DESCRIPTION: NORMAL

## (undated) DEVICE — SOLUTION IRRIG 3000ML 0.9% SOD CHL USP UROMATIC PLAS CONT

## (undated) DEVICE — BLADE,STAINLESS-STEEL,15,STRL,DISPOSABLE: Brand: MEDLINE

## (undated) DEVICE — TOWEL,OR,DSP,ST,BLUE,STD,6/PK,12PK/CS: Brand: MEDLINE

## (undated) DEVICE — GLOVE ORANGE PI 8   MSG9080

## (undated) DEVICE — LAPAROSCOPIC SCISSORS: Brand: EPIX LAPAROSCOPIC SCISSORS

## (undated) DEVICE — STRAP POS MP 30X3 IN HK LOOP CLOSURE FOAM DISP

## (undated) DEVICE — RELOAD STPL L75MM OPN H3.8MM CLS 1.5MM WIRE DIA0.2MM REG

## (undated) DEVICE — Y-TYPE TUR/BLADDER IRRIGATION SET, REGULATING CLAMP

## (undated) DEVICE — BOOT,SUTURE,STANDARD,YELLOW-IN-BLUE: Brand: MEDLINE

## (undated) DEVICE — PROBE GAMMA TRUNODE

## (undated) DEVICE — DRAIN SURG W10MMXL20CM SIL FULL PERF HUBLESS FLAT RADPQ

## (undated) DEVICE — LIQUIBAND RAPID ADHESIVE 36/CS 0.8ML: Brand: MEDLINE

## (undated) DEVICE — APPLIER LIG CLP M L11IN TI STR RNG HNDL FOR 20 CLP DISP

## (undated) DEVICE — PACK,LAPAROTOMY,NO GOWNS: Brand: MEDLINE

## (undated) DEVICE — NEEDLE HYPO 25GA L1.5IN BLU POLYPR HUB S STL REG BVL STR

## (undated) DEVICE — STAPLER SKIN SQ 30 ABSRB STPL DISP INSORB ORDER VIA PHONE OR EMAIL

## (undated) DEVICE — SYRINGE MED 10ML TRNSLUC BRL PLUNG BLK MRK POLYPR CTRL

## (undated) DEVICE — STAPLER INT L75MM CUT LN L73MM STPL LN L77MM BLU B FRM 8

## (undated) DEVICE — GARMENT,MEDLINE,DVT,INT,CALF,MED, GEN2: Brand: MEDLINE

## (undated) DEVICE — SYRINGE MED 10ML LUERLOCK TIP W/O SFTY DISP

## (undated) DEVICE — YANKAUER,BULB TIP,W/O VENT,RIGID,STERILE: Brand: MEDLINE

## (undated) DEVICE — INSUFFLATION NEEDLE TO ESTABLISH PNEUMOPERITONEUM.: Brand: INSUFFLATION NEEDLE

## (undated) DEVICE — PUMP SUC IRR TBNG L10FT W/ HNDPC ASSEMB STRYKEFLOW 2

## (undated) DEVICE — Device

## (undated) DEVICE — BRA SURG 2XL FULL SUPP SFT CUP FR CLSR ADJ STRP

## (undated) DEVICE — PLASMABLADE PS210-030S 3.0S LOCK: Brand: PLASMABLADE™

## (undated) DEVICE — AGENT HEMSTAT 5GM ARISTA AH

## (undated) DEVICE — GLOVE ORANGE PI 7 1/2   MSG9075

## (undated) DEVICE — DRAPE,REIN 53X77,STERILE: Brand: MEDLINE

## (undated) DEVICE — UNIVERSAL DRAPE: Brand: MEDLINE INDUSTRIES, INC.

## (undated) DEVICE — HUMBLES LAPWRAP® (10/CASE): Brand: HUMBLES LAPWRAP®

## (undated) DEVICE — TROCAR: Brand: KII FIOS FIRST ENTRY

## (undated) DEVICE — BLADE ES ELASTOMERIC COAT INSUL DURABLE BEND UPTO 90DEG

## (undated) DEVICE — SEALER/DIVIDER LAP SHFT L44CM DIA5MM STR BLNT TIP JAW HND

## (undated) DEVICE — GOWN,SIRUS,FABRNF,XL,20/CS: Brand: MEDLINE

## (undated) DEVICE — BANDAGE,GAUZE,4.5"X4.1YD,STERILE,LF: Brand: MEDLINE

## (undated) DEVICE — COVER,LIGHT HANDLE,FLX,1/PK: Brand: MEDLINE INDUSTRIES, INC.

## (undated) DEVICE — 1LYRTR 16FR10ML100%SIL UMS SNP: Brand: MEDLINE INDUSTRIES, INC.

## (undated) DEVICE — WIPES SKIN CLOTH READYPREP 9 X 10.5 IN 2% CHLORHEX GLUCONATE CHG PREOP

## (undated) DEVICE — LAPAROSCOPIC WIRE L HK 45 CM

## (undated) DEVICE — BASIC DOUBLE BASIN 2-LF: Brand: MEDLINE INDUSTRIES, INC.

## (undated) DEVICE — PLUMEPORT ACTIV LAPAROSCOPIC SMOKE FILTRATION DEVICE: Brand: PLUMEPORT ACTIVE

## (undated) DEVICE — GOWN,SIRUS,NONRNF,SETINSLV,XL,20/CS: Brand: MEDLINE

## (undated) DEVICE — DRAPE THER FLUID WARMING 66X44 IN FLAT SLUSH DBL DISC ORS

## (undated) DEVICE — DOUBLE BASIN SET: Brand: MEDLINE INDUSTRIES, INC.

## (undated) DEVICE — NDL CNTR 40CT FM MAG: Brand: MEDLINE INDUSTRIES, INC.

## (undated) DEVICE — 15.5F X 24CM TITAN HD CATHETER (CUFF 19CM FROM  TIP): Brand: TITAN HD

## (undated) DEVICE — GAUZE,SPONGE,4"X4",16PLY,XRAY,STRL,LF: Brand: MEDLINE

## (undated) DEVICE — ELECTRODE PT RET AD L9FT HI MOIST COND ADH HYDRGEL CORDED

## (undated) DEVICE — PACK SURG LAP CHOLE CUSTOM

## (undated) DEVICE — STANDARD HYPODERMIC NEEDLE,ALUMINUM HUB: Brand: MONOJECT

## (undated) DEVICE — ELECTRODE ELECSURG BLADE 7 CM 2.75 IN MOD OPN E-Z CLN

## (undated) DEVICE — APPLICATOR MEDICATED 26 CC SOLUTION HI LT ORNG CHLORAPREP

## (undated) DEVICE — COVER,TABLE,44X90,STERILE: Brand: MEDLINE

## (undated) DEVICE — DECANTER BAG 9": Brand: MEDLINE INDUSTRIES, INC.

## (undated) DEVICE — TUBING, SUCTION, 3/16" X 12', STRAIGHT: Brand: MEDLINE

## (undated) DEVICE — 3M™ STERI-STRIP™ REINFORCED ADHESIVE SKIN CLOSURES, R1548, 1 IN X 5 IN (25 MM X 125 MM), 4 STRIPS/ENVELOPE: Brand: 3M™ STERI-STRIP™

## (undated) DEVICE — 4-PORT MANIFOLD: Brand: NEPTUNE 2

## (undated) DEVICE — NEEDLE FLTR 18GA L1.5IN MEM THK5UM BLNT DISP

## (undated) DEVICE — SYRINGE IRRIG 60ML SFT PLIABLE BLB EZ TO GRP 1 HND USE W/

## (undated) DEVICE — COVER PRB W14XL147CM TELESCOPICALLY FLD EXT LEN CIV-FLEX

## (undated) DEVICE — STERILE POLYISOPRENE POWDER-FREE SURGICAL GLOVES: Brand: PROTEXIS

## (undated) DEVICE — MARKER,SKIN,WI/RULER AND LABELS: Brand: MEDLINE

## (undated) DEVICE — SPONGE LAP W18XL18IN WHT COT 4 PLY FLD STRUNG RADPQ DISP ST 2 PER PACK

## (undated) DEVICE — SYRINGE MED 50ML LUERLOCK TIP

## (undated) DEVICE — ACCESS PLATFORM FOR MINIMALLY INVASIVE SURGERY: Brand: GELPOINT® ADVANCED ACCESS PLATFORM

## (undated) DEVICE — TROCAR: Brand: KII SLEEVE

## (undated) DEVICE — AGENT HEMSTAT 3GM OXIDIZED REGENERATED CELOS ABSRB FOR CONT (ORDER MULTIPLES OF 5EA)

## (undated) DEVICE — DRAIN SURG 19FR 0.25IN SIL RND W/ TRCR INDIC DOT RADPQ FULL

## (undated) DEVICE — TUBING, SUCTION, 1/4" X 10', STRAIGHT: Brand: MEDLINE

## (undated) DEVICE — CYSTO/BLADDER IRRIGATION SET, REGULATING CLAMP

## (undated) DEVICE — COVER,TABLE,60X90,STERILE: Brand: MEDLINE

## (undated) DEVICE — STERILE SAFETY PINS #2 2/PK: Brand: MEDLINE

## (undated) DEVICE — GOWN,SIRUS,FABRNF,L,20/CS: Brand: MEDLINE